# Patient Record
Sex: MALE | Race: WHITE | NOT HISPANIC OR LATINO | Employment: OTHER | URBAN - METROPOLITAN AREA
[De-identification: names, ages, dates, MRNs, and addresses within clinical notes are randomized per-mention and may not be internally consistent; named-entity substitution may affect disease eponyms.]

---

## 2017-01-03 ENCOUNTER — OFFICE VISIT (OUTPATIENT)
Dept: NEUROLOGY | Facility: CLINIC | Age: 72
End: 2017-01-03
Payer: COMMERCIAL

## 2017-01-03 VITALS
HEART RATE: 74 BPM | SYSTOLIC BLOOD PRESSURE: 145 MMHG | HEIGHT: 70 IN | WEIGHT: 258.4 LBS | DIASTOLIC BLOOD PRESSURE: 75 MMHG | BODY MASS INDEX: 36.99 KG/M2

## 2017-01-03 DIAGNOSIS — R89.9 ABNORMAL LABORATORY TEST: ICD-10-CM

## 2017-01-03 DIAGNOSIS — E53.8 LOW SERUM VITAMIN B12: ICD-10-CM

## 2017-01-03 DIAGNOSIS — R79.0 LOW SERUM FERRITIN LEVEL: ICD-10-CM

## 2017-01-03 DIAGNOSIS — G60.9 IDIOPATHIC PERIPHERAL NEUROPATHY: Primary | ICD-10-CM

## 2017-01-03 DIAGNOSIS — G25.81 RESTLESS LEG SYNDROME: ICD-10-CM

## 2017-01-03 PROCEDURE — 99215 OFFICE O/P EST HI 40 MIN: CPT | Performed by: PSYCHIATRY & NEUROLOGY

## 2017-01-03 RX ORDER — GABAPENTIN 300 MG/1
300 CAPSULE ORAL 3 TIMES DAILY
Qty: 270 CAPSULE | Refills: 3 | Status: SHIPPED | OUTPATIENT
Start: 2017-01-10 | End: 2017-01-23 | Stop reason: DRUGHIGH

## 2017-01-03 NOTE — NURSING NOTE
IMAGING: None  BLOOD TEST RESULTS: Not recommended on previous visit  Referral: NA  Old records: NA  EEG: NA

## 2017-01-03 NOTE — PROGRESS NOTES
ESTABLISHED PATIENT NEUROLOGY NOTE    LOCATION: [facility] [place]    DATE OF VISIT: January 3, 2017   PRIMARY CARE PROVIDER: Jesse Edgar PA-C    REASON FOR VISIT: Test Result and plan of management    HISTORY OF PRESENT ILLNESS ([place]): Mr. Sylvie Harvey previously referred by Jesse Edgar PA-C.     71 year old man with clinical features of peripheral neuropathy. He was started on small dose of GABAPENTIN and currently he is taking 100mg three times/day. He thinks it has helped him minimally, only with numbness symptoms. He continues having increasing pain and tingling symptoms of the lower limbs.     His blood tests were reviewed and unfortunately he did not check them in [facility].     His serum ferritin is significantly low at 9. He was advised to start taking iron and vitamin C supplements. No history of blood loss either rectally, or per urethra or upper GI. Last colonoscopy was 5 years ago.     He also has low Vitamin B 12 level, and therefore was advised to start taking Vitamin B12 2500 microgram daily.     His positive DEBBIE is of nonspecific cause. It does not need any further evaluation.     He had minimally elevated IGA with decreased IGM, absent monoclonal protein.     He did not have any Electromyography test of the lower limbs.     He does have some symptoms which raise the possibility of restless leg syndrome. His symptoms of feelings of insects crawling in his lower limbs have subsided. He denies urge to keep pacing around the house. He denies any undue discomfort with prolonged sitting.     Reviewed and updated in [facility]:    Current Outpatient Prescriptions on File Prior to Visit:  gabapentin (NEURONTIN) 100 MG capsule Week 1: 100 mg in the evening. Week 2: 100 mg times a day. Week 3 and afterwards : 100 mg three times a day   atorvastatin (LIPITOR) 40 MG tablet Take 1 tablet (40 mg) by mouth daily   prasugrel (EFFIENT) 10 MG TABS Take 1 tablet (10 mg) by mouth daily    hydrochlorothiazide (HYDRODIURIL) 25 MG tablet Take 1 tablet (25 mg) by mouth daily   losartan (COZAAR) 50 MG tablet Take 1 tablet (50 mg) by mouth daily   metoprolol (LOPRESSOR) 25 MG tablet Take 1 tablet (25 mg) by mouth 2 times daily   buPROPion (WELLBUTRIN XL) 300 MG 24 hr tablet Take 1 tablet (300 mg) by mouth every morning   nitroglycerin (NITROSTAT) 0.4 MG SL tablet Place 1 tablet (0.4 mg) under the tongue every 5 minutes as needed for chest pain (may repeat x2)   aspirin 81 MG tablet Take 1 tablet by mouth daily.     No current facility-administered medications on file prior to visit.  Past Medical History   Diagnosis Date     Arthritis      Hypertension      Headaches      Drug abuse      Quit over 25 yrs ago     Alcohol abuse      Quit over 25 yrs ago     Past Surgical History   Procedure Laterality Date     Appendectomy       Orthopedic surgery Right      Scar over dorsum aspect of right wrist.      Orthopedic surgery Right      Knee     Orthopedic surgery Bilateral      CTS release     Surgical history of -        Umbilical Hernia     Surgical history of -   2013     Coronary Stent     Social History     Social History     Marital Status:      Spouse Name: Jenna     Number of Children: 4     Years of Education: 12     Occupational History     [employer]     Part-time [employer]     Social History Main Topics     Smoking status: Former Smoker     Quit date: 04/17/1983     Smokeless tobacco: Never Used     Alcohol Use: No      Comment: Quit 25 years ago (2013)     Drug Use: No     Sexual Activity:     Partners: Female     Other Topics Concern     None     Social History Narrative   This document serves as a record of the services and decisions personally performed and made by Lenard Valerio MD. It was created on his behalf by Delores Antonio, a trained medical scribe. The creation of this document is based the provider's statements to the medical scribe.    Vanibyousuf Antonio  "1/3/2017    GENERAL EXAMINATION:   General appearance: Pleasant male sitting comfortably in a chair  /75 mmHg  Pulse 74  Ht 1.765 m (5' 9.5\")  Wt 117.209 kg (258 lb 6.4 oz)  BMI 37.62 kg/m2    IMPRESSION:  Encounter Diagnoses   Name Primary?     Idiopathic peripheral neuropathy Yes     Low serum vitamin B12      Low serum ferritin level      Abnormal laboratory test - elevated IGA, absent monoclonal protein      Restless leg syndrome      COMMENTS: Increasing symptoms of peripheral neuropathy. Arranged for him to have Electromyography of the lower limbs to find out the type and severity of neuropathy. He does have some component of restless leg syndrome with it also, with significant low ferritin level, requiring treatment. Jesse Edgar PA-C may consider evaluating him for GI blood loss, especially in view of very low ferritin although it can be seen in individuals with restless leg syndrome. I may add that restless leg syndrome symptoms are minimal compared to peripheral neuropathy symptoms.     PLAN/RECOMMENDATIONS:   Patient Instructions     Lenard Valerio MD at 10/30/2016  3:45 PM      Status: Signed         Expand All Collapse All    Quick Note:      Dear Mr. Harvey,    Your recent blood tests are normal except for:  -Low normal ferritin (total body iron store) with recommended target of >50. Therefore I am suggesting you to start taking the following tablets/capsules for next 4 months:  Over-the-counter ferrous gluconate 324 mg (38 Fe) 1 tablet three times a day after food.  Over-the-counter vitamin C 500 mg with each iron tablet to increase iron absorption  Please note that stool can be black colored during iron therapy.  --Low vitamin B12. Advised to take over-the-counter vitamin B12 one tablet 2500 microgram  Daily.  -Positive antinuclear antibody (DEBBIE), non specific antibody  -Elevated IgA and low IgM of non-specific significance    * Please call Jesse Edgar PA-C's " office to look for detailed evaluation of low ferritin level, as it may involve GI consultation for loss of blood leading to it.     Results for orders placed or performed in visit on 10/25/16  -Ferritin      Result                                            Value                         Ref Range                      Ferritin                                          9 (L)                         26 - 388 ng/mL             -Vitamin B12      Result                                            Value                         Ref Range                      Vitamin B12                                       254                           193 - 986 pg/mL            -Protein Immunofixation Serum      Result                                            Value                         Ref Range                      Immunofixation ELP                                                                                         No monoclonal protein seen on immunofixation.  Pathological significance requires clinical correlation. Aidan Stacy M.D., Ph.D.       IGG                                               1530                          695 - 1620 mg/dL               IGA                                               387 (H)                       70 - 380 mg/dL                 IGM                                               38 (L)                        60 - 265 mg/dL             -Antinuclear antibody screen by EIA      Result                                            Value                         Ref Range                      DEBBIE Screen by EIA                                 1.0 (H)                       <1.0                       -Methylmalonic acid      Result                                            Value                         Ref Range                      Methylmalonic Acid                                0.17                                                         Best wishes.    Thanks.    Sincerely,    Lenard Valerio,  MD, [facility]  Neurologist               Electromyography (EMG) test (Lower limbs))   DATE:  Thursday, January 12, 2016  TIME: Reporting by 12:40 PM for registration at  and test preparation.   LOCATION: [facility] (2nd floor)    You may bring pair of warm gloves and or socks to keep your hands and feet warm during the test.     No restrictions for diet. Please feel free to have breakfast/lunch before arrival.     Please do not apply lotion or cream to the skin one day before the test; as it could hinder the electrode connection to the skin.    EMG procedure and reasons for it explained.  -------------------------------------------------------------------------------------------------------------------  Feet Care.     Orders Placed This Encounter   Medications     gabapentin (NEURONTIN) 300 MG capsule     Sig: Take 1 capsule (300 mg) by mouth 3 times daily     Dispense:  270 capsule     Refill:  3     Advised him to start taking GABAPENTIN 100 mg strength, two tablets three times a day for one week, and after that GABAPENTIN strength 300 mg, one tablet three times day. He has enough GABAPENTIN 100 mg strength from the previous prescription.     He is advised to see his Jesse Edgar PA-C regarding his low ferritin level, but in the meantime he will start taking iron with Vitamin C supplements as indicated above.      Diagnostic possibilities reviewed    Preventive Neurology: Encouraged to keep physically and mentally active with particular emphasis on daily stretching exercises, walking, and healthy eating.    Thanks to Ifeanyi Xiaoor allowing me to take part in Sylvie TERESA Wes's care.  Please call me if you have any questions or concerns.    Time with patient  25 minutes, greater than 50% of which was counseling and coordination of care.    Lenard Valerio MD, [facility]  Neurologist    Cc: Jesse Edgar PA-C

## 2017-01-03 NOTE — MR AVS SNAPSHOT
After Visit Summary   1/3/2017    Sylvie Harvey    MRN: [id number]           Patient Information     Date Of Birth          1945        Visit Information        Provider Department      1/3/2017 4:00 PM Lenard Valerio MD [facility]        Today's Diagnoses     Idiopathic peripheral neuropathy    -  1     Low serum vitamin B12         Low serum ferritin level         Abnormal laboratory test - elevated IGA, absent monoclonal protein           Care Instructions    Lenard Valerio MD at 10/30/2016  3:45 PM      Status: Signed         Expand All Collapse All    Quick Note:      Dear Mr. Harvey,    Your recent blood tests are normal except for:  -Low normal ferritin (total body iron store) with recommended target of >50. Therefore I am suggesting you to start taking the following tablets/capsules for next 4 months:  Over-the-counter ferrous gluconate 324 mg (38 Fe) 1 tablet three times a day after food.  Over-the-counter vitamin C 500 mg with each iron tablet to increase iron absorption  Please note that stool can be black colored during iron therapy.  --Low vitamin B12. Advised to take over-the-counter vitamin B12 one tablet 2500 microgram  Daily.  -Positive antinuclear antibody (DEBBIE), non specific antibody  -Elevated IgA and low IgM of non-specific significance    * Please call Jesse Edgar PA-C's office to look for detailed evaluation of low ferritin level, as it may involve GI consultation for loss of blood leading to it.     Results for orders placed or performed in visit on 10/25/16  -Ferritin      Result                                            Value                         Ref Range                      Ferritin                                          9 (L)                         26 - 388 ng/mL             -Vitamin B12      Result                                            Value                         Ref Range                      Vitamin B12                                        254                           193 - 986 pg/mL            -Protein Immunofixation Serum      Result                                            Value                         Ref Range                      Immunofixation ELP                                                                                         No monoclonal protein seen on immunofixation.  Pathological significance requires clinical correlation. Aidan Stacy M.D., Ph.D.       IGG                                               1530                          695 - 1620 mg/dL               IGA                                               387 (H)                       70 - 380 mg/dL                 IGM                                               38 (L)                        60 - 265 mg/dL             -Antinuclear antibody screen by EIA      Result                                            Value                         Ref Range                      DEBBIE Screen by EIA                                 1.0 (H)                       <1.0                       -Methylmalonic acid      Result                                            Value                         Ref Range                      Methylmalonic Acid                                0.17                                                         Best wishes.    Thanks.    Sincerely,    Lenard Valerio MD, [facility]  Neurologist               Electromyography (EMG) test (Lower limbs))   DATE:  Thursday, January 12, 2016  TIME: Reporting by 12:40 PM for registration at  and test preparation.   LOCATION: [facility] (2nd floor)    You may bring pair of warm gloves and or socks to keep your hands and feet warm during the test.     No restrictions for diet. Please feel free to have breakfast/lunch before arrival.     Please do not apply lotion or cream to the skin one day before the test; as it could hinder the electrode connection to the  skin.    EMG procedure and reasons for it explained.  -------------------------------------------------------------------------------------------------------------------  Feet Care.     Orders Placed This Encounter   Medications     gabapentin (NEURONTIN) 300 MG capsule     Sig: Take 1 capsule (300 mg) by mouth 3 times daily     Dispense:  270 capsule     Refill:  3     Advised him to start taking GABAPENTIN 100 mg strength, two tablets three times a day for one week, and after that GABAPENTIN strength 300 mg, one tablet three times day. He has enough GABAPENTIN 100 mg strength from the previous prescription.     He is advised to see his Jesse Edgar PA-C regarding his low ferritin level, but in the meantime he will start taking iron with Vitamin C supplements as indicated above.      Diagnostic possibilities reviewed    Preventive Neurology: Encouraged to keep physically and mentally active with particular emphasis on daily stretching exercises, walking, and healthy eating.    Thanks           Follow-ups after your visit        Follow-up notes from your care team     Return in about 9 days (around 1/12/2017) for EMG LL: reporting by 12:40pm. .      Your next 10 appointments already scheduled     Jan 12, 2017  1:00 PM   PROCEDURE with Lenard Valerio MD   [facility] ([facility])    [address] [place] [id number] [phone]              Who to contact     If you have questions or need follow up information about today's clinic visit or your schedule please contact [facility] directly at [phone].  Normal or non-critical lab and imaging results will be communicated to you by MyChart, letter or phone within 4 business days after the clinic has received the results. If you do not hear from us within 7 days, please contact the clinic through MyChart or phone. If you have a critical or abnormal lab result, we will notify you by  "phone as soon as possible.  Submit refill requests through [employer] or call your pharmacy and they will forward the refill request to us. Please allow 3 business days for your refill to be completed.          Additional Information About Your Visit        [employer] Information     [employer] gives you secure access to your electronic health record. If you see a primary care provider, you can also send messages to your care team and make appointments. If you have questions, please call your primary care clinic.  If you do not have a primary care provider, please call [phone] and they will assist you.        Care EveryWhere ID     This is your Care EveryWhere ID. This could be used by other organizations to access your [facility] medical records  [id number]        Your Vitals Were     Pulse Height BMI (Body Mass Index)             74 1.765 m (5' 9.5\") 37.62 kg/m2          Blood Pressure from Last 3 Encounters:   01/03/17 145/75   10/25/16 156/74   10/18/16 138/80    Weight from Last 3 Encounters:   01/03/17 117.209 kg (258 lb 6.4 oz)   10/25/16 115.214 kg (254 lb)   10/18/16 113.853 kg (251 lb)              Today, you had the following     No orders found for display         Today's Medication Changes          These changes are accurate as of: 1/3/17  5:46 PM.  If you have any questions, ask your nurse or doctor.               These medicines have changed or have updated prescriptions.        Dose/Directions    * gabapentin 100 MG capsule   Commonly known as:  NEURONTIN   This may have changed:  Another medication with the same name was added. Make sure you understand how and when to take each.   Used for:  Idiopathic peripheral neuropathy   Changed by:  Lenard Valerio MD        Week 1: 100 mg in the evening. Week 2: 100 mg times a day. Week 3 and afterwards : 100 mg three times a day   Quantity:  270 capsule   Refills:  3       * gabapentin 300 MG capsule   Commonly known as:  NEURONTIN   This may have changed:  " You were already taking a medication with the same name, and this prescription was added. Make sure you understand how and when to take each.   Used for:  Idiopathic peripheral neuropathy   Changed by:  Lenard Valerio MD        Dose:  300 mg   Start taking on:  1/10/2017   Take 1 capsule (300 mg) by mouth 3 times daily   Quantity:  270 capsule   Refills:  3       * Notice:  This list has 2 medication(s) that are the same as other medications prescribed for you. Read the directions carefully, and ask your doctor or other care provider to review them with you.         Where to get your medicines      These medications were sent to [facility] [id number] [place] [address], [facility]  [address] [address], [facility] [address]     Phone:  [phone]    - gabapentin 300 MG capsule             Primary Care Provider Office Phone # Fax #    Jesse Edgar PA-C [phone]       [facility] [address] [facility] [address]        Thank you!     Thank you for choosing [facility]  for your care. Our goal is always to provide you with excellent care. Hearing back from our patients is one way we can continue to improve our services. Please take a few minutes to complete the written survey that you may receive in the mail after your visit with us. Thank you!             Your Updated Medication List - Protect others around you: Learn how to safely use, store and throw away your medicines at www.disposemymeds.org.          This list is accurate as of: 1/3/17  5:46 PM.  Always use your most recent med list.                   Brand Name Dispense Instructions for use    aspirin 81 MG tablet      Take 1 tablet by mouth daily.       atorvastatin 40 MG tablet    LIPITOR    90 tablet    Take 1 tablet (40 mg) by mouth daily       buPROPion 300 MG 24 hr tablet    WELLBUTRIN XL    90 tablet    Take 1 tablet (300 mg) by mouth every morning       * gabapentin 100 MG  capsule    NEURONTIN    270 capsule    Week 1: 100 mg in the evening. Week 2: 100 mg times a day. Week 3 and afterwards : 100 mg three times a day       * gabapentin 300 MG capsule   Start taking on:  1/10/2017    NEURONTIN    270 capsule    Take 1 capsule (300 mg) by mouth 3 times daily       hydrochlorothiazide 25 MG tablet    HYDRODIURIL    90 tablet    Take 1 tablet (25 mg) by mouth daily       losartan 50 MG tablet    COZAAR    90 tablet    Take 1 tablet (50 mg) by mouth daily       metoprolol 25 MG tablet    LOPRESSOR    180 tablet    Take 1 tablet (25 mg) by mouth 2 times daily       nitroglycerin 0.4 MG sublingual tablet    NITROSTAT    25 tablet    Place 1 tablet (0.4 mg) under the tongue every 5 minutes as needed for chest pain (may repeat x2)       prasugrel 10 MG Tabs tablet    EFFIENT    30 tablet    Take 1 tablet (10 mg) by mouth daily       * Notice:  This list has 2 medication(s) that are the same as other medications prescribed for you. Read the directions carefully, and ask your doctor or other care provider to review them with you.

## 2017-01-03 NOTE — NURSING NOTE
"Chief Complaint   Patient presents with     RECHECK     Lower Limb numbness       Initial /75 mmHg  Pulse 74  Ht 1.765 m (5' 9.5\")  Wt 117.209 kg (258 lb 6.4 oz)  BMI 37.62 kg/m2 Estimated body mass index is 37.62 kg/(m^2) as calculated from the following:    Height as of this encounter: 1.765 m (5' 9.5\").    Weight as of this encounter: 117.209 kg (258 lb 6.4 oz).  BP completed using cuff size: kobe Oakes MA      "

## 2017-01-04 ENCOUNTER — TELEPHONE (OUTPATIENT)
Dept: PHARMACY | Facility: OTHER | Age: 72
End: 2017-01-04

## 2017-01-04 NOTE — TELEPHONE ENCOUNTER
MTM referral from: [facility]     MTM referral outreach attempt #1 on January 4, 2017 at 3:14 PM      Outcome: Patient is not interested at this time because he said he did not know about this referral and his insurance does not cover MTM, will route to MTM Pharmacist/Provider as an FYI. Thank you for the referral.     Anay Doll, MTM Coordinator

## 2017-01-09 ENCOUNTER — TELEPHONE (OUTPATIENT)
Dept: FAMILY MEDICINE | Facility: CLINIC | Age: 72
End: 2017-01-09

## 2017-01-09 DIAGNOSIS — I10 HYPERTENSION GOAL BP (BLOOD PRESSURE) < 140/90: ICD-10-CM

## 2017-01-09 DIAGNOSIS — I25.10 CAD (CORONARY ARTERY DISEASE): Primary | ICD-10-CM

## 2017-01-09 RX ORDER — HYDROCHLOROTHIAZIDE 25 MG/1
25 TABLET ORAL DAILY
Qty: 90 TABLET | Refills: 0 | Status: SHIPPED | OUTPATIENT
Start: 2017-01-09 | End: 2017-03-28

## 2017-01-09 RX ORDER — METOPROLOL TARTRATE 25 MG/1
25 TABLET, FILM COATED ORAL 2 TIMES DAILY
Qty: 180 TABLET | Refills: 0 | Status: SHIPPED | OUTPATIENT
Start: 2017-01-09 | End: 2017-03-28

## 2017-01-09 RX ORDER — NITROGLYCERIN 0.4 MG/1
0.4 TABLET SUBLINGUAL EVERY 5 MIN PRN
Qty: 10 TABLET | Refills: 3 | Status: SHIPPED | OUTPATIENT
Start: 2017-01-09

## 2017-01-09 RX ORDER — LOSARTAN POTASSIUM 50 MG/1
50 TABLET ORAL DAILY
Qty: 90 TABLET | Refills: 0 | Status: SHIPPED | OUTPATIENT
Start: 2017-01-09 | End: 2017-03-28

## 2017-01-09 NOTE — TELEPHONE ENCOUNTER
Patient aware of which medications were sent to [facility] for refill.  He states understanding that per our records this is what he will need refilled.  States is ok with these refills; did review each med with him.  Will be seen again n April.  Akua Pino RN

## 2017-01-09 NOTE — TELEPHONE ENCOUNTER
Reason for Call:  Medication refill:    Do you use a [facility]?  Name of the pharmacy and phone number for the current request:  [facility] [phone]    Name of the medication requested: nitroglycerin (NITROSTAT) 0.4 MG SL tablet & hydrochlorothiazide (HYDRODIURIL) 25 MG tablet     Other request: Patient needs meds refilled.  These 2 meds plus he will check at home if there are any other meds to refill.  He only wants quantity 10 for his Nitrolycerin, and not 25.  Please call and advise.  Thank you.    Can we leave a detailed message on this number? YES    Phone number patient can be reached at: Cell number on file [phone] (home)    Best Time: anytime    Call taken on 1/9/2017 at 2:29 PM by Maryellen Ramos

## 2017-01-11 DIAGNOSIS — E78.5 HYPERLIPIDEMIA LDL GOAL <130: ICD-10-CM

## 2017-01-11 DIAGNOSIS — I25.119 CORONARY ARTERY DISEASE INVOLVING NATIVE HEART WITH ANGINA PECTORIS, UNSPECIFIED VESSEL OR LESION TYPE (H): Primary | ICD-10-CM

## 2017-01-12 ENCOUNTER — TELEPHONE (OUTPATIENT)
Dept: NEUROLOGY | Facility: CLINIC | Age: 72
End: 2017-01-12

## 2017-01-12 RX ORDER — ATORVASTATIN CALCIUM 40 MG/1
40 TABLET, FILM COATED ORAL DAILY
Qty: 90 TABLET | Refills: 0 | Status: SHIPPED | OUTPATIENT
Start: 2017-01-12 | End: 2017-03-28

## 2017-01-13 NOTE — TELEPHONE ENCOUNTER
Reason for Call:  Other prescription    Detailed comments: pt calling back states needs to discuss with provider about refills was told to get in contact with provider regarding medications please advise and contact    Phone Number Patient can be reached at: Home number on file [phone] (home)    Best Time: ANY    Can we leave a detailed message on this number? YES    Call taken on 1/13/2017 at 2:28 PM by Ava Calvert

## 2017-01-13 NOTE — TELEPHONE ENCOUNTER
Pt states he got letter from pharmacy to contact provider.  Advised we have done multiple refills recently and I do not know which med they are referring to.  He will call them.  Sandra Fernandez RN

## 2017-01-23 ENCOUNTER — OFFICE VISIT (OUTPATIENT)
Dept: NEUROLOGY | Facility: CLINIC | Age: 72
End: 2017-01-23
Payer: COMMERCIAL

## 2017-01-23 DIAGNOSIS — R79.0 LOW FERRITIN LEVEL: ICD-10-CM

## 2017-01-23 DIAGNOSIS — G60.9 IDIOPATHIC PERIPHERAL NEUROPATHY: Primary | ICD-10-CM

## 2017-01-23 DIAGNOSIS — R79.89 LOW VITAMIN B12 LEVEL: ICD-10-CM

## 2017-01-23 DIAGNOSIS — G25.81 RESTLESS LEGS SYNDROME: ICD-10-CM

## 2017-01-23 PROCEDURE — 95909 NRV CNDJ TST 5-6 STUDIES: CPT | Performed by: PSYCHIATRY & NEUROLOGY

## 2017-01-23 RX ORDER — GABAPENTIN 400 MG/1
400 CAPSULE ORAL 3 TIMES DAILY
Qty: 270 CAPSULE | Refills: 3 | Status: SHIPPED | OUTPATIENT
Start: 2017-02-23 | End: 2017-10-10

## 2017-01-23 RX ORDER — GABAPENTIN 400 MG/1
400 CAPSULE ORAL 3 TIMES DAILY
Qty: 90 CAPSULE | Refills: 0 | Status: SHIPPED | OUTPATIENT
Start: 2017-01-23 | End: 2017-10-10

## 2017-01-23 NOTE — PATIENT INSTRUCTIONS
AFTER VISIT SUMMARY (AVS)  Signed Prescriptions:                        Disp   Refills    gabapentin (NEURONTIN) 400 MG capsule      90 cap*0        Sig: Take 1 capsule (400 mg) by mouth 3 times daily  Authorizing Provider: EDUARDO TIM    gabapentin (NEURONTIN) 400 MG capsule      270 ca*3        Sig: Take 1 capsule (400 mg) by mouth 3 times daily  Authorizing Provider: EDUARDO TIM    Electromyography test results reviewed with him    Feet care    Preventive Neurology: Encouraged to keep physically and mentally active with particular emphasis on daily stretching exercises, walking, and healthy eating.        To call us for follow-up appointment in next 4 month(s) or earlier if needed.    Thanks

## 2017-01-23 NOTE — NURSING NOTE
Cardiac Pacemaker: None  Corticosteroids  (Prednisone):  None  Coumadin:  None  Defibrillator: None  Previous EMG study: None  Surgeries: Cervical spine: None Lumbar spine: None Hip surgery: No  Scars other than from surgeries indicated above:

## 2017-01-23 NOTE — MR AVS SNAPSHOT
After Visit Summary   1/23/2017    Sylvie Harvey    MRN: [id number]           Patient Information     Date Of Birth          1945        Visit Information        Provider Department      1/23/2017 2:00 PM Lenard Valerio MD [facility]        Today's Diagnoses     Idiopathic peripheral neuropathy    -  1     Low ferritin level         Low vitamin B12 level         Restless legs syndrome           Care Instructions    AFTER VISIT SUMMARY (AVS)  Signed Prescriptions:                        Disp   Refills    gabapentin (NEURONTIN) 400 MG capsule      90 cap*0        Sig: Take 1 capsule (400 mg) by mouth 3 times daily  Authorizing Provider: LENARD VALERIO    gabapentin (NEURONTIN) 400 MG capsule      270 ca*3        Sig: Take 1 capsule (400 mg) by mouth 3 times daily  Authorizing Provider: LENARD VALERIO    Electromyography test results reviewed with him    Feet care    Preventive Neurology: Encouraged to keep physically and mentally active with particular emphasis on daily stretching exercises, walking, and healthy eating.        To call us for follow-up appointment in next 4 month(s) or earlier if needed.    Thanks             Follow-ups after your visit        Follow-up notes from your care team     Return in about 4 months (around 5/23/2017).      Who to contact     If you have questions or need follow up information about today's clinic visit or your schedule please contact [facility] directly at [phone].  Normal or non-critical lab and imaging results will be communicated to you by MyChart, letter or phone within 4 business days after the clinic has received the results. If you do not hear from us within 7 days, please contact the clinic through MyChart or phone. If you have a critical or abnormal lab result, we will notify you by phone as soon as possible.  Submit refill requests through [email or web address] or call your pharmacy and they will  forward the refill request to us. Please allow 3 business days for your refill to be completed.          Additional Information About Your Visit        [employer] Information     [employer] gives you secure access to your electronic health record. If you see a primary care provider, you can also send messages to your care team and make appointments. If you have questions, please call your primary care clinic.  If you do not have a primary care provider, please call [phone] and they will assist you.        Care EveryWhere ID     This is your Care EveryWhere ID. This could be used by other organizations to access your [place] medical records  [id number]         Blood Pressure from Last 3 Encounters:   01/03/17 145/75   10/25/16 156/74   10/18/16 138/80    Weight from Last 3 Encounters:   01/03/17 117.209 kg (258 lb 6.4 oz)   10/25/16 115.214 kg (254 lb)   10/18/16 113.853 kg (251 lb)              Today, you had the following     No orders found for display         Today's Medication Changes          These changes are accurate as of: 1/23/17  3:18 PM.  If you have any questions, ask your nurse or doctor.               These medicines have changed or have updated prescriptions.        Dose/Directions    * gabapentin 400 MG capsule   Commonly known as:  NEURONTIN   This may have changed:    - medication strength  - how much to take  - how to take this  - when to take this  - additional instructions   Used for:  Idiopathic peripheral neuropathy, Low ferritin level, Low vitamin B12 level        Dose:  400 mg   Take 1 capsule (400 mg) by mouth 3 times daily   Quantity:  90 capsule   Refills:  0       * gabapentin 400 MG capsule   Commonly known as:  NEURONTIN   This may have changed:    - medication strength  - how much to take   Used for:  Idiopathic peripheral neuropathy, Restless legs syndrome        Dose:  400 mg   Start taking on:  2/23/2017   Take 1 capsule (400 mg) by mouth 3 times daily   Quantity:  270 capsule    Refills:  3       * Notice:  This list has 2 medication(s) that are the same as other medications prescribed for you. Read the directions carefully, and ask your doctor or other care provider to review them with you.         Where to get your medicines      These medications were sent to [facility] - [place], [place] - [address] [address], [address]  [address] [address], [facility] [address]     Phone:  [phone]    - gabapentin 400 MG capsule      These medications were sent to [facility] - [place] [address] [facility]  [address], [facility] [address]     Phone:  [phone]    - gabapentin 400 MG capsule             Primary Care Provider Office Phone # Fax #    Jesse Edgar PA-C [phone]       [facility] [address] [facility] [address]        Thank you!     Thank you for choosing [facility]  for your care. Our goal is always to provide you with excellent care. Hearing back from our patients is one way we can continue to improve our services. Please take a few minutes to complete the written survey that you may receive in the mail after your visit with us. Thank you!             Your Updated Medication List - Protect others around you: Learn how to safely use, store and throw away your medicines at www.disposemymeds.org.          This list is accurate as of: 1/23/17  3:18 PM.  Always use your most recent med list.                   Brand Name Dispense Instructions for use    aspirin 81 MG tablet      Take 1 tablet by mouth daily.       atorvastatin 40 MG tablet    LIPITOR    90 tablet    Take 1 tablet (40 mg) by mouth daily       buPROPion 300 MG 24 hr tablet    WELLBUTRIN XL    90 tablet    Take 1 tablet (300 mg) by mouth every morning       * gabapentin 400 MG capsule    NEURONTIN    90 capsule    Take 1 capsule (400 mg) by mouth 3 times daily       * gabapentin 400 MG capsule   Start taking  on:  2/23/2017    NEURONTIN    270 capsule    Take 1 capsule (400 mg) by mouth 3 times daily       hydrochlorothiazide 25 MG tablet    HYDRODIURIL    90 tablet    Take 1 tablet (25 mg) by mouth daily       losartan 50 MG tablet    COZAAR    90 tablet    Take 1 tablet (50 mg) by mouth daily       metoprolol 25 MG tablet    LOPRESSOR    180 tablet    Take 1 tablet (25 mg) by mouth 2 times daily       nitroglycerin 0.4 MG sublingual tablet    NITROSTAT    10 tablet    Place 1 tablet (0.4 mg) under the tongue every 5 minutes as needed for chest pain (may repeat x2)       prasugrel 10 MG Tabs tablet    EFFIENT    30 tablet    Take 1 tablet (10 mg) by mouth daily       * Notice:  This list has 2 medication(s) that are the same as other medications prescribed for you. Read the directions carefully, and ask your doctor or other care provider to review them with you.

## 2017-01-23 NOTE — PROGRESS NOTES
ELECTRODIAGNOSTIC LABORATORY REPORT    LOCATION: [facility]  MRN: [id number]  NAME: Mr. Sylvie Harvey  :  1945 (71 year old)  DATE OF VISIT:  2017  PRIMARY PROVIDER: Jesse Edgar PA-C    REASON FOR THE TEST: Feet paresthesias    CLINICAL DATA:  71 year old man with increasing symptoms of feet paresthesias for more than 2 years duration. Additional issues have been restless leg syndrome along with low serum ferritin. He continues taking iron supplement, Vitamin C, Vitamin B12 on a daily basis. He feels that GABAPENTIN 300MG TID has not been helping him enough. H/o low ferritin, vitamin B12 and concern for possibility of Restless legs syndrome.  Local examination shows that he has pitting edema of the legs extending up to knees.  For additional details, please see my recent office consultation note.     This document serves as a record of the services and decisions personally performed and made by Lenard Valerio MD. It was created on his behalf by Penny Perez, a trained medical scribe. The creation of this document is based the provider's statements to the medical scribe.    Scribyousuf Perez 2017     PERTINENT FINDINGS:   Sensory studies:  1. Right & Left Sural sensory responses normal    Motor studies:  1. Right Peroneal (EDB muscle) motor response normal   2. Right & Left Tibial (AH muscle) motor responses normal     Needle EMG examination of Right & left lower limb muscles not done because of the pitting edema up to the knees.     IMPRESSION: Normal nerve conduction study. There is no definite electrophysiological evidence of acquired distal generalized polyneuropathy.  The possibility of small fiber neuropathy cannot be completely excludes as you know that this test evaluates only large nerve fibers.     COMMENTS: The test results reviewed with him. Increased his GABAPENTIN from 300 MG TID to 400 MG TID. Advised to continue taking iron supplements along  with Vitamin C, and Vitamin B12, 2000 MCG daily as discussed during his last visit.      Thanks to Jesse Edgar PA-C for allowing me to participate in 's care. Please call me if you have any questions or concerns.    Cc. Jesse Edgar PA-C

## 2017-03-28 DIAGNOSIS — I10 HYPERTENSION GOAL BP (BLOOD PRESSURE) < 140/90: ICD-10-CM

## 2017-03-28 DIAGNOSIS — E78.5 HYPERLIPIDEMIA LDL GOAL <130: ICD-10-CM

## 2017-03-28 DIAGNOSIS — I25.119 CORONARY ARTERY DISEASE INVOLVING NATIVE HEART WITH ANGINA PECTORIS, UNSPECIFIED VESSEL OR LESION TYPE (H): ICD-10-CM

## 2017-03-28 DIAGNOSIS — F32.0 MILD MAJOR DEPRESSION (H): ICD-10-CM

## 2017-03-28 RX ORDER — BUPROPION HYDROCHLORIDE 300 MG/1
TABLET ORAL
Qty: 90 TABLET | Refills: 1 | Status: SHIPPED | OUTPATIENT
Start: 2017-03-28 | End: 2017-10-10

## 2017-03-28 RX ORDER — METOPROLOL TARTRATE 25 MG/1
TABLET, FILM COATED ORAL
Qty: 180 TABLET | Refills: 1 | Status: SHIPPED | OUTPATIENT
Start: 2017-03-28 | End: 2017-10-10

## 2017-03-28 RX ORDER — LOSARTAN POTASSIUM 50 MG/1
TABLET ORAL
Qty: 90 TABLET | Refills: 1 | Status: SHIPPED | OUTPATIENT
Start: 2017-03-28 | End: 2017-10-10

## 2017-03-28 RX ORDER — ATORVASTATIN CALCIUM 40 MG/1
TABLET, FILM COATED ORAL
Qty: 90 TABLET | Refills: 1 | Status: SHIPPED | OUTPATIENT
Start: 2017-03-28 | End: 2017-10-10

## 2017-03-28 RX ORDER — HYDROCHLOROTHIAZIDE 25 MG/1
TABLET ORAL
Qty: 90 TABLET | Refills: 1 | Status: SHIPPED | OUTPATIENT
Start: 2017-03-28 | End: 2017-10-10

## 2017-03-29 ASSESSMENT — PATIENT HEALTH QUESTIONNAIRE - PHQ9: SUM OF ALL RESPONSES TO PHQ QUESTIONS 1-9: 0

## 2017-04-18 ENCOUNTER — TELEPHONE (OUTPATIENT)
Dept: FAMILY MEDICINE | Facility: CLINIC | Age: 72
End: 2017-04-18

## 2017-04-18 NOTE — TELEPHONE ENCOUNTER
Panel Management Review      Patient has the following on his problem list: None      Composite cancer screening  Chart review shows that this patient is due/due soon for the following Colonoscopy  Summary:    Patient is due/failing the following:   COLONOSCOPY    Action needed:   Patient needs referral/order: colon    Type of outreach:    Sent letter.    Questions for provider review:    None                                                                                                                                    SUSHANT Ling   2:21 PM  4/18/2017       Chart routed to closed .

## 2017-04-18 NOTE — LETTER
[facility]  [address] [address]  Phone: [phone]    04/18/17    Sylvie BRII Wes  [address] [facility] [facility] [address]      To whom it may concern:     Our records indicate that you have not scheduled for a(n)colonoscopy which was recommended by your health care team. Monitoring and managing your preventative and chronic health conditions are very important to us.     If you have received your health care elsewhere, please provide us with that information so it can be documented in your chart.    Please call [phone] or message us through your [employer]aitment account to schedule an appointment or provide information for your chart.     I look forward to seeing you and working with you on your health care needs.       *If you have already scheduled an appointment, please disregard this reminder    Sincerely,      Jesse Edgar PA-C

## 2017-08-14 ENCOUNTER — TELEPHONE (OUTPATIENT)
Dept: FAMILY MEDICINE | Facility: CLINIC | Age: 72
End: 2017-08-14

## 2017-08-14 NOTE — TELEPHONE ENCOUNTER
8/14/2017    Call Regarding Preventive Health Screening Colonoscopy    Attempt 1    Message on voicemail     Comments: manual dial      Outreach   Mickie Quesada

## 2017-08-23 ENCOUNTER — ALLIED HEALTH/NURSE VISIT (OUTPATIENT)
Dept: FAMILY MEDICINE | Facility: CLINIC | Age: 72
End: 2017-08-23
Payer: COMMERCIAL

## 2017-08-23 VITALS — DIASTOLIC BLOOD PRESSURE: 62 MMHG | HEART RATE: 64 BPM | SYSTOLIC BLOOD PRESSURE: 126 MMHG

## 2017-08-23 DIAGNOSIS — Z01.30 BP CHECK: Primary | ICD-10-CM

## 2017-08-23 PROCEDURE — 99207 ZZC NO CHARGE NURSE ONLY: CPT | Performed by: PHYSICIAN ASSISTANT

## 2017-08-23 NOTE — TELEPHONE ENCOUNTER
8/23/2017    Call Regarding Preventive Health Screening Colonoscopy    Attempt 2    Message on voicemail     Comments:           Outreach   AT

## 2017-08-23 NOTE — MR AVS SNAPSHOT
After Visit Summary   8/23/2017    Sylvie Harvey    MRN: [id number]           Patient Information     Date Of Birth          1945        Visit Information        Provider Department      8/23/2017 2:08 PM Jesse Edgar PA-C [facility]        Today's Diagnoses     BP check    -  1       Follow-ups after your visit        Who to contact     If you have questions or need follow up information about today's clinic visit or your schedule please contact [facility] directly at [phone].  Normal or non-critical lab and imaging results will be communicated to you by [employer], letter or phone within 4 business days after the clinic has received the results. If you do not hear from us within 7 days, please contact the clinic through [employer] or phone. If you have a critical or abnormal lab result, we will notify you by phone as soon as possible.  Submit refill requests through [employer] or call your pharmacy and they will forward the refill request to us. Please allow 3 business days for your refill to be completed.          Additional Information About Your Visit        MyChart Information     [employer] gives you secure access to your electronic health record. If you see a primary care provider, you can also send messages to your care team and make appointments. If you have questions, please call your primary care clinic.  If you do not have a primary care provider, please call [phone] and they will assist you.        Care EveryWhere ID     This is your Care EveryWhere ID. This could be used by other organizations to access your [place] medical records  [id number]        Your Vitals Were     Pulse                   64            Blood Pressure from Last 3 Encounters:   08/23/17 126/62   01/03/17 145/75   10/25/16 156/74    Weight from Last 3 Encounters:   01/03/17 258 lb 6.4 oz (117.2 kg)   10/25/16 254 lb (115.2 kg)   10/18/16 251 lb (113.9 kg)               Today, you had the following     No orders found for display       Primary Care Provider Office Phone # Fax #    Jesse Edgar PA-C [phone] [address] [facility] [address]        Equal Access to Services     MICHAEL CARVAJAL : Hadii jason ku hadyennyo Soomaali, waaxda luqadaha, qaybta kaalmada adeegyada, maribel stauffer hayyoselynn jessicayaneth galvan kd . So [facility] [phone].    ATENCIÓN: Si habla español, tiene a walker disposición servicios gratuitos de asistencia lingüística. Llame al [phone].    We comply with applicable federal civil rights laws and [place] laws. We do not discriminate on the basis of race, color, national origin, age, disability sex, sexual orientation or gender identity.            Thank you!     Thank you for choosing [facility]  for your care. Our goal is always to provide you with excellent care. Hearing back from our patients is one way we can continue to improve our services. Please take a few minutes to complete the written survey that you may receive in the mail after your visit with us. Thank you!             Your Updated Medication List - Protect others around you: Learn how to safely use, store and throw away your medicines at www.disposemymeds.org.          This list is accurate as of: 8/23/17  2:10 PM.  Always use your most recent med list.                   Brand Name Dispense Instructions for use Diagnosis    aspirin 81 MG tablet      Take 1 tablet by mouth daily.        atorvastatin 40 MG tablet    LIPITOR    90 tablet    Take 1 tablet by mouth  daily    Coronary artery disease involving native heart with angina pectoris, unspecified vessel or lesion type (H), Hyperlipidemia LDL goal <130       buPROPion 300 MG 24 hr tablet    WELLBUTRIN XL    90 tablet    Take 1 tablet by mouth  every morning    Mild major depression (H)       * gabapentin 400 MG capsule    NEURONTIN    90 capsule    Take 1 capsule (400 mg) by mouth 3 times daily     Idiopathic peripheral neuropathy, Low ferritin level, Low vitamin B12 level       * gabapentin 400 MG capsule    NEURONTIN    270 capsule    Take 1 capsule (400 mg) by mouth 3 times daily    Idiopathic peripheral neuropathy, Restless legs syndrome       hydrochlorothiazide 25 MG tablet    HYDRODIURIL    90 tablet    Take 1 tablet by mouth  daily    Hypertension goal BP (blood pressure) < 140/90       losartan 50 MG tablet    COZAAR    90 tablet    Take 1 tablet by mouth  daily    Hypertension goal BP (blood pressure) < 140/90       metoprolol 25 MG tablet    LOPRESSOR    180 tablet    Take 1 tablet by mouth two  times daily    Hypertension goal BP (blood pressure) < 140/90       nitroGLYcerin 0.4 MG sublingual tablet    NITROSTAT    10 tablet    Place 1 tablet (0.4 mg) under the tongue every 5 minutes as needed for chest pain (may repeat x2)    CAD (coronary artery disease)       prasugrel 10 MG Tabs tablet    EFFIENT    30 tablet    Take 1 tablet (10 mg) by mouth daily    Coronary artery disease involving native heart with angina pectoris, unspecified vessel or lesion type (H)       * Notice:  This list has 2 medication(s) that are the same as other medications prescribed for you. Read the directions carefully, and ask your doctor or other care provider to review them with you.

## 2017-08-23 NOTE — PROGRESS NOTES
Sylvie Harvey is enrolled/participating in the retail pharmacy Blood Pressure Goals Achievement Program (BPGAP).  Sylvie Harvey was evaluated at [facility] on August 23, 2017 at which time his blood pressure was:    BP Readings from Last 3 Encounters:   08/23/17 126/62   01/03/17 145/75   10/25/16 156/74     Reviewed lifestyle modifications for blood pressure control and reduction: including making healthy food choices, managing weight, getting regular exercise, smoking cessation, reducing alcohol consumption, monitoring blood pressure regularly.     Sylvie Harvey is not experiencing symptoms.    Follow-Up: BP is at goal of < 150/90 mmHg (patient 60+ years of age without diabetes).  Recommended follow-up at pharmacy in 6 months.     Recommendation to Provider: Jesse Edgar     Sylvie Harvey was evaluated for enrollment into the PGEN study today.    Patient eligible for enrollment:  No  Patient interested in enrollment:  No    Completed by: Yoni Foreman RPh.  [facility]  [phone]

## 2017-08-28 NOTE — TELEPHONE ENCOUNTER
8/28/2017    Call Regarding Preventive Health Screening Colonoscopy    Attempt 3    Message on voicemail     Comments:       Outreach   Mickie Quesada

## 2017-10-03 ENCOUNTER — DOCUMENTATION ONLY (OUTPATIENT)
Dept: LAB | Facility: CLINIC | Age: 72
End: 2017-10-03

## 2017-10-03 DIAGNOSIS — E78.5 HYPERLIPIDEMIA LDL GOAL <130: Primary | ICD-10-CM

## 2017-10-03 DIAGNOSIS — Z12.9 CANCER SCREENING: ICD-10-CM

## 2017-10-03 NOTE — PROGRESS NOTES
Need previsit labs-see orders and close encounter if nothing else needed./Heike Sotomayor,       BP 10/10/17

## 2017-10-05 ENCOUNTER — DOCUMENTATION ONLY (OUTPATIENT)
Dept: LAB | Facility: CLINIC | Age: 72
End: 2017-10-05

## 2017-10-05 DIAGNOSIS — Z12.9 CANCER SCREENING: ICD-10-CM

## 2017-10-05 DIAGNOSIS — E78.5 HYPERLIPIDEMIA LDL GOAL <130: ICD-10-CM

## 2017-10-05 DIAGNOSIS — R79.0 DECREASED FERRITIN: Primary | ICD-10-CM

## 2017-10-05 DIAGNOSIS — R79.0 LOW FERRITIN: Primary | ICD-10-CM

## 2017-10-05 LAB
ANION GAP SERPL CALCULATED.3IONS-SCNC: 8 MMOL/L (ref 3–14)
BUN SERPL-MCNC: 45 MG/DL (ref 7–30)
CALCIUM SERPL-MCNC: 9.2 MG/DL (ref 8.5–10.1)
CHLORIDE SERPL-SCNC: 102 MMOL/L (ref 94–109)
CHOLEST SERPL-MCNC: 114 MG/DL
CO2 SERPL-SCNC: 27 MMOL/L (ref 20–32)
CREAT SERPL-MCNC: 1.39 MG/DL (ref 0.66–1.25)
GFR SERPL CREATININE-BSD FRML MDRD: 50 ML/MIN/1.7M2
GLUCOSE SERPL-MCNC: 95 MG/DL (ref 70–99)
HDLC SERPL-MCNC: 51 MG/DL
LDLC SERPL CALC-MCNC: 47 MG/DL
NONHDLC SERPL-MCNC: 63 MG/DL
POTASSIUM SERPL-SCNC: 4.4 MMOL/L (ref 3.4–5.3)
SODIUM SERPL-SCNC: 137 MMOL/L (ref 133–144)
TRIGL SERPL-MCNC: 78 MG/DL

## 2017-10-05 PROCEDURE — 36415 COLL VENOUS BLD VENIPUNCTURE: CPT | Performed by: PHYSICIAN ASSISTANT

## 2017-10-05 PROCEDURE — 80061 LIPID PANEL: CPT | Performed by: PHYSICIAN ASSISTANT

## 2017-10-05 PROCEDURE — 80048 BASIC METABOLIC PNL TOTAL CA: CPT | Performed by: PHYSICIAN ASSISTANT

## 2017-10-05 PROCEDURE — 82728 ASSAY OF FERRITIN: CPT | Performed by: PSYCHIATRY & NEUROLOGY

## 2017-10-05 NOTE — PROGRESS NOTES
.Your patient was in for lab test today for Oppel. He said he also wanted labs drawn for you for his upcoming appointment. There are no orders in Epic placed by you. I drew JIC tubes.  Please review, tag any orders to the lab appointment or enter orders as a future and I will watch for them.  Thank you   Nery   @ [facility]

## 2017-10-06 LAB — FERRITIN SERPL-MCNC: 161 NG/ML (ref 26–388)

## 2017-10-10 ENCOUNTER — OFFICE VISIT (OUTPATIENT)
Dept: FAMILY MEDICINE | Facility: CLINIC | Age: 72
End: 2017-10-10
Payer: COMMERCIAL

## 2017-10-10 VITALS
HEIGHT: 70 IN | HEART RATE: 66 BPM | DIASTOLIC BLOOD PRESSURE: 79 MMHG | SYSTOLIC BLOOD PRESSURE: 120 MMHG | BODY MASS INDEX: 30.06 KG/M2 | OXYGEN SATURATION: 96 % | WEIGHT: 210 LBS

## 2017-10-10 DIAGNOSIS — E78.5 HYPERLIPIDEMIA LDL GOAL <130: ICD-10-CM

## 2017-10-10 DIAGNOSIS — I25.119 CORONARY ARTERY DISEASE INVOLVING NATIVE HEART WITH ANGINA PECTORIS, UNSPECIFIED VESSEL OR LESION TYPE (H): ICD-10-CM

## 2017-10-10 DIAGNOSIS — F32.0 MILD MAJOR DEPRESSION (H): ICD-10-CM

## 2017-10-10 DIAGNOSIS — I10 HYPERTENSION GOAL BP (BLOOD PRESSURE) < 140/90: ICD-10-CM

## 2017-10-10 PROCEDURE — 99213 OFFICE O/P EST LOW 20 MIN: CPT | Performed by: PHYSICIAN ASSISTANT

## 2017-10-10 RX ORDER — BUPROPION HYDROCHLORIDE 300 MG/1
TABLET ORAL
Qty: 90 TABLET | Refills: 1 | Status: SHIPPED | OUTPATIENT
Start: 2017-10-10 | End: 2018-04-09

## 2017-10-10 RX ORDER — METOPROLOL TARTRATE 25 MG/1
TABLET, FILM COATED ORAL
Qty: 180 TABLET | Refills: 1 | Status: SHIPPED | OUTPATIENT
Start: 2017-10-10 | End: 2018-06-26

## 2017-10-10 RX ORDER — HYDROCHLOROTHIAZIDE 25 MG/1
TABLET ORAL
Qty: 90 TABLET | Refills: 1 | Status: SHIPPED | OUTPATIENT
Start: 2017-10-10 | End: 2018-04-09

## 2017-10-10 RX ORDER — ATORVASTATIN CALCIUM 40 MG/1
TABLET, FILM COATED ORAL
Qty: 90 TABLET | Refills: 3 | Status: SHIPPED | OUTPATIENT
Start: 2017-10-10 | End: 2018-09-14

## 2017-10-10 RX ORDER — LOSARTAN POTASSIUM 50 MG/1
TABLET ORAL
Qty: 90 TABLET | Refills: 1 | Status: SHIPPED | OUTPATIENT
Start: 2017-10-10 | End: 2018-04-09

## 2017-10-10 ASSESSMENT — PATIENT HEALTH QUESTIONNAIRE - PHQ9: SUM OF ALL RESPONSES TO PHQ QUESTIONS 1-9: 0

## 2017-10-10 NOTE — MR AVS SNAPSHOT
After Visit Summary   10/10/2017    Sylvie Harvey    MRN: [id number]           Patient Information     Date Of Birth          1945        Visit Information        Provider Department      10/10/2017 9:20 AM Jesse Edgar PA-C [facility]        Today's Diagnoses     Hypertension goal BP (blood pressure) < 140/90        Coronary artery disease involving native heart with angina pectoris, unspecified vessel or lesion type (H)        Hyperlipidemia LDL goal <130        Mild major depression (H)           Follow-ups after your visit        Your next 10 appointments already scheduled     Oct 18, 2017  9:15 AM CDT   New Visit with Eric Arora DPM   [facility] ([facility])    [address] [address] [phone]              Who to contact     If you have questions or need follow up information about today's clinic visit or your schedule please contact [facility] directly at [phone].  Normal or non-critical lab and imaging results will be communicated to you by Mynglet, letter or phone within 4 business days after the clinic has received the results. If you do not hear from us within 7 days, please contact the clinic through wishkicker or phone. If you have a critical or abnormal lab result, we will notify you by phone as soon as possible.  Submit refill requests through wishkicker or call your pharmacy and they will forward the refill request to us. Please allow 3 business days for your refill to be completed.          Additional Information About Your Visit        FindIthart Information     wishkicker gives you secure access to your electronic health record. If you see a primary care provider, you can also send messages to your care team and make appointments. If you have questions, please call your primary care clinic.  If you do not have a primary care provider, please call [phone] and they will assist  "you.        Care EveryWhere ID     This is your Care EveryWhere ID. This could be used by other organizations to access your [facility] medical records  [id number]        Your Vitals Were     Pulse Height Pulse Oximetry BMI (Body Mass Index)          66 5' 9.5\" (1.765 m) 96% 30.57 kg/m2         Blood Pressure from Last 3 Encounters:   10/10/17 120/79   08/23/17 126/62   01/03/17 145/75    Weight from Last 3 Encounters:   10/10/17 210 lb (95.3 kg)   01/03/17 258 lb 6.4 oz (117.2 kg)   10/25/16 254 lb (115.2 kg)              We Performed the Following     DEPRESSION ACTION PLAN (DAP)          Today's Medication Changes          These changes are accurate as of: 10/10/17  9:37 AM.  If you have any questions, ask your nurse or doctor.               These medicines have changed or have updated prescriptions.        Dose/Directions    atorvastatin 40 MG tablet   Commonly known as:  LIPITOR   This may have changed:  See the new instructions.   Used for:  Coronary artery disease involving native heart with angina pectoris, unspecified vessel or lesion type (H), Hyperlipidemia LDL goal <130   Changed by:  Jesse Edgar PA-C        Take 1 tablet by mouth  daily   Quantity:  90 tablet   Refills:  3       buPROPion 300 MG 24 hr tablet   Commonly known as:  WELLBUTRIN XL   This may have changed:  See the new instructions.   Used for:  Mild major depression (H)   Changed by:  Jesse Edgar PA-C        Take 1 tablet by mouth  every morning   Quantity:  90 tablet   Refills:  1       hydrochlorothiazide 25 MG tablet   Commonly known as:  HYDRODIURIL   This may have changed:  See the new instructions.   Used for:  Hypertension goal BP (blood pressure) < 140/90   Changed by:  Jesse Edgar PA-C        Take 1 tablet by mouth  daily   Quantity:  90 tablet   Refills:  1       losartan 50 MG tablet   Commonly known as:  COZAAR   This may have changed:  See the new instructions.   Used for:  Hypertension goal BP " (blood pressure) < 140/90   Changed by:  Jesse Edgar PA-C        Take 1 tablet by mouth  daily   Quantity:  90 tablet   Refills:  1       metoprolol 25 MG tablet   Commonly known as:  LOPRESSOR   This may have changed:  See the new instructions.   Used for:  Hypertension goal BP (blood pressure) < 140/90   Changed by:  Jesse Edgar PA-C        Take 1 tablet by mouth two  times daily   Quantity:  180 tablet   Refills:  1            Where to get your medicines      These medications were sent to [facility] - [address], [facility] [address]     Phone:  [phone]     atorvastatin 40 MG tablet    buPROPion 300 MG 24 hr tablet    hydrochlorothiazide 25 MG tablet    losartan 50 MG tablet    metoprolol 25 MG tablet                Primary Care Provider Office Phone # Fax #    Jesse Edgar PA-C [phone] [address] [facility] [address]        Equal Access to Services     BIENVENIDO CARVAJAL : Hadii aad ku hadasho Soomaali, waaxda luqadaha, qaybta kaalmada adeegyada, waxay idiin hayaan adeeg kharamadan yi . So [facility] [phone].    ATENCIÓN: Si habla español, tiene a walker disposición servicios gratuitos de asistencia lingüística. [facility] [phone].    We comply with applicable federal civil rights laws and [place] laws. We do not discriminate on the basis of race, color, national origin, age, disability, sex, sexual orientation, or gender identity.            Thank you!     Thank you for choosing [facility]  for your care. Our goal is always to provide you with excellent care. Hearing back from our patients is one way we can continue to improve our services. Please take a few minutes to complete the written survey that you may receive in the mail after your visit with us. Thank you!             Your Updated Medication List - Protect others around you: Learn how to safely use, store and  throw away your medicines at www.disposemymeds.org.          This list is accurate as of: 10/10/17  9:37 AM.  Always use your most recent med list.                   Brand Name Dispense Instructions for use Diagnosis    aspirin 81 MG tablet      Take 1 tablet by mouth daily.        atorvastatin 40 MG tablet    LIPITOR    90 tablet    Take 1 tablet by mouth  daily    Coronary artery disease involving native heart with angina pectoris, unspecified vessel or lesion type (H), Hyperlipidemia LDL goal <130       buPROPion 300 MG 24 hr tablet    WELLBUTRIN XL    90 tablet    Take 1 tablet by mouth  every morning    Mild major depression (H)       hydrochlorothiazide 25 MG tablet    HYDRODIURIL    90 tablet    Take 1 tablet by mouth  daily    Hypertension goal BP (blood pressure) < 140/90       losartan 50 MG tablet    COZAAR    90 tablet    Take 1 tablet by mouth  daily    Hypertension goal BP (blood pressure) < 140/90       metoprolol 25 MG tablet    LOPRESSOR    180 tablet    Take 1 tablet by mouth two  times daily    Hypertension goal BP (blood pressure) < 140/90       nitroGLYcerin 0.4 MG sublingual tablet    NITROSTAT    10 tablet    Place 1 tablet (0.4 mg) under the tongue every 5 minutes as needed for chest pain (may repeat x2)    CAD (coronary artery disease)       prasugrel 10 MG Tabs tablet    EFFIENT    30 tablet    Take 1 tablet (10 mg) by mouth daily    Coronary artery disease involving native heart with angina pectoris, unspecified vessel or lesion type (H)

## 2017-10-10 NOTE — PROGRESS NOTES
SUBJECTIVE:                                                    Sylvie Harvey is a 72 year old male who presents to clinic today for the following health issues:    Hyperlipidemia Follow-Up      Rate your low fat/cholesterol diet?: not monitoring fat    Taking statin?  Yes, no muscle aches from statin    Other lipid medications/supplements?:  none  Has lost 50lbs with diet and exercise.     Hypertension Follow-up      Outpatient blood pressures are being checked at home.  Results are normal.    Low Salt Diet: not monitoring salt    Depression Followup    Status since last visit: Stable     See PHQ-9 for current symptoms.  Other associated symptoms: None    Complicating factors:   Significant life event:  No   Current substance abuse:  None  Anxiety or Panic symptoms:  No    History of CAD and see's cardiology yearly. Next apt in 2 wks with [facility].     PHQ-9 Score and MyChart F/U Questions 7/22/2016 3/28/2017 10/10/2017   Total Score 0 0 0   Q9: Suicide Ideation Not at all Not at all Not at all       PHQ-9  English  PHQ-9   Any Language  Suicide Assessment Five-step Evaluation and Treatment (SAFE-T)          Amount of exercise or physical activity: active     Problems taking medications regularly: No    Medication side effects: none  Diet: little to no carbs, pt is down almost 50 lbs       Problem list and histories reviewed & adjusted, as indicated.  Additional history: as documented    Patient Active Problem List   Diagnosis     Advanced directives, counseling/discussion     Hyperlipidemia LDL goal <130     Pain in shoulder     Plantar fasciitis     Medial meniscus tear     Coronary artery disease involving native heart with angina pectoris, unspecified vessel or lesion type (H)     Arthritis     Major depressive disorder, recurrent episode, mild (H)     Past Surgical History:   Procedure Laterality Date     APPENDECTOMY       ORTHOPEDIC SURGERY Right     Scar over dorsum aspect of right wrist.       ORTHOPEDIC SURGERY Right     Knee     ORTHOPEDIC SURGERY Bilateral     CTS release     SURGICAL HISTORY OF -       Umbilical Hernia     SURGICAL HISTORY OF -   2013    Coronary Stent       Social History   Substance Use Topics     Smoking status: Former Smoker     Quit date: 4/17/1983     Smokeless tobacco: Never Used     Alcohol use No      Comment: Quit 25 years ago (2013)     History reviewed. No pertinent family history.      Current Outpatient Prescriptions   Medication Sig Dispense Refill     losartan (COZAAR) 50 MG tablet Take 1 tablet by mouth  daily 90 tablet 1     atorvastatin (LIPITOR) 40 MG tablet Take 1 tablet by mouth  daily 90 tablet 3     metoprolol (LOPRESSOR) 25 MG tablet Take 1 tablet by mouth two  times daily 180 tablet 1     hydrochlorothiazide (HYDRODIURIL) 25 MG tablet Take 1 tablet by mouth  daily 90 tablet 1     buPROPion (WELLBUTRIN XL) 300 MG 24 hr tablet Take 1 tablet by mouth  every morning 90 tablet 1     prasugrel (EFFIENT) 10 MG TABS Take 1 tablet (10 mg) by mouth daily 30 tablet      aspirin 81 MG tablet Take 1 tablet by mouth daily.       [DISCONTINUED] losartan (COZAAR) 50 MG tablet Take 1 tablet by mouth  daily 90 tablet 1     [DISCONTINUED] atorvastatin (LIPITOR) 40 MG tablet Take 1 tablet by mouth  daily 90 tablet 1     [DISCONTINUED] metoprolol (LOPRESSOR) 25 MG tablet Take 1 tablet by mouth two  times daily 180 tablet 1     [DISCONTINUED] hydrochlorothiazide (HYDRODIURIL) 25 MG tablet Take 1 tablet by mouth  daily 90 tablet 1     [DISCONTINUED] buPROPion (WELLBUTRIN XL) 300 MG 24 hr tablet Take 1 tablet by mouth  every morning 90 tablet 1     nitroglycerin (NITROSTAT) 0.4 MG sublingual tablet Place 1 tablet (0.4 mg) under the tongue every 5 minutes as needed for chest pain (may repeat x2) (Patient not taking: Reported on 10/10/2017) 10 tablet 3     Allergies   Allergen Reactions     Sulfa Drugs      BP Readings from Last 3 Encounters:   10/10/17 120/79   08/23/17 126/62  "  01/03/17 145/75    Wt Readings from Last 3 Encounters:   10/10/17 210 lb (95.3 kg)   01/03/17 258 lb 6.4 oz (117.2 kg)   10/25/16 254 lb (115.2 kg)                  Labs reviewed in EPIC        ROS:  Constitutional, HEENT, cardiovascular, pulmonary, gi and gu systems are negative, except as otherwise noted.      OBJECTIVE:   /79  Pulse 66  Ht 5' 9.5\" (1.765 m)  Wt 210 lb (95.3 kg)  SpO2 96%  BMI 30.57 kg/m2  Body mass index is 30.57 kg/(m^2).  GENERAL: healthy, alert and no distress  EYES: Eyes grossly normal to inspection, PERRL and conjunctivae and sclerae normal  HENT: ear canals and TM's normal, nose and mouth without ulcers or lesions  NECK: no adenopathy, no asymmetry, masses, or scars and thyroid normal to palpation  RESP: lungs clear to auscultation - no rales, rhonchi or wheezes  CV: regular rate and rhythm, normal S1 S2, no S3 or S4, no murmur, click or rub, no peripheral edema and peripheral pulses strong  ABDOMEN: soft, nontender, no hepatosplenomegaly, no masses and bowel sounds normal  MS: no gross musculoskeletal defects noted, no edema    Diagnostic Test Results:  none     ASSESSMENT/PLAN:         ICD-10-CM    1. Hypertension goal BP (blood pressure) < 140/90 I10 losartan (COZAAR) 50 MG tablet     metoprolol (LOPRESSOR) 25 MG tablet     hydrochlorothiazide (HYDRODIURIL) 25 MG tablet   2. Coronary artery disease involving native heart with angina pectoris, unspecified vessel or lesion type (H) I25.119 atorvastatin (LIPITOR) 40 MG tablet   3. Hyperlipidemia LDL goal <130 E78.5 atorvastatin (LIPITOR) 40 MG tablet   4. Mild major depression (H) F32.0 buPROPion (WELLBUTRIN XL) 300 MG 24 hr tablet   meds refilled.  con't Work on Healthy diet and exercise. Getting heart rate elevated for 30 mins most days of week.   Recheck 6 months.     Jesse Edgar PA-C  [facility]  "

## 2017-10-10 NOTE — LETTER
My Depression Action Plan  Name: Sylvie Harvey   Date of Birth 1945  Date: 10/10/2017    My doctor: Jesse Edgar   My clinic: [facility]  [id number] [place] [address] [phone]          GREEN    ZONE   Good Control    What it looks like:     Things are going generally well. You have normal up s and down s. You may even feel depressed from time to time, but bad moods usually last less than a day.   What you need to do:  1. Continue to care for yourself (see self care plan)  2. Check your depression survival kit and update it as needed  3. Follow your physician s recommendations including any medication.  4. Do not stop taking medication unless you consult with your physician first.           YELLOW         ZONE Getting Worse    What it looks like:     Depression is starting to interfere with your life.     It may be hard to get out of bed; you may be starting to isolate yourself from others.    Symptoms of depression are starting to last most all day and this has happened for several days.     You may have suicidal thoughts but they are not constant.   What you need to do:     1. Call your care team, your response to treatment will improve if you keep your care team informed of your progress. Yellow periods are signs an adjustment may need to be made.     2. Continue your self-care, even if you have to fake it!    3. Talk to someone in your support network    4. Open up your depression survival kit           RED    ZONE Medical Alert - Get Help    What it looks like:     Depression is seriously interfering with your life.     You may experience these or other symptoms: You can t get out of bed most days, can t work or engage in other necessary activities, you have trouble taking care of basic hygiene, or basic responsibilities, thoughts of suicide or death that will not go away, self-injurious behavior.     What you need to do:  1. Call your care team  and request a same-day appointment. If they are not available (weekends or after hours) call your local crisis line, emergency room or 911.      Electronically signed by: Anastacia Jauregui, October 10, 2017    Depression Self Care Plan / Survival Kit    Self-Care for Depression  Here s the deal. Your body and mind are really not as separate as most people think.  What you do and think affects how you feel and how you feel influences what you do and think. This means if you do things that people who feel good do, it will help you feel better.  Sometimes this is all it takes.  There is also a place for medication and therapy depending on how severe your depression is, so be sure to consult with your medical provider and/ or Behavioral Health Consultant if your symptoms are worsening or not improving.     In order to better manage my stress, I will:    Exercise  Get some form of exercise, every day. This will help reduce pain and release endorphins, the  feel good  chemicals in your brain. This is almost as good as taking antidepressants!  This is not the same as joining a gym and then never going! (they count on that by the way ) It can be as simple as just going for a walk or doing some gardening, anything that will get you moving.      Hygiene   Maintain good hygiene (Get out of bed in the morning, Make your bed, Brush your teeth, Take a shower, and Get dressed like you were going to work, even if you are unemployed).  If your clothes don't fit try to get ones that do.    Diet  I will strive to eat foods that are good for me, drink plenty of water, and avoid excessive sugar, caffeine, alcohol, and other mood-altering substances.  Some foods that are helpful in depression are: complex carbohydrates, B vitamins, flaxseed, fish or fish oil, fresh fruits and vegetables.    Psychotherapy  I agree to participate in Individual Therapy (if recommended).    Medication  If prescribed medications, I agree to take them.  Missing  doses can result in serious side effects.  I understand that drinking alcohol, or other illicit drug use, may cause potential side effects.  I will not stop my medication abruptly without first discussing it with my provider.    Staying Connected With Others  I will stay in touch with my friends, family members, and my primary care provider/team.    Use your imagination  Be creative.  We all have a creative side; it doesn t matter if it s oil painting, sand castles, or mud pies! This will also kick up the endorphins.    Witness Beauty  (AKA stop and smell the roses) Take a look outside, even in mid-winter. Notice colors, textures. Watch the squirrels and birds.     Service to others  Be of service to others.  There is always someone else in need.  By helping others we can  get out of ourselves  and remember the really important things.  This also provides opportunities for practicing all the other parts of the program.    Humor  Laugh and be silly!  Adjust your TV habits for less news and crime-drama and more comedy.    Control your stress  Try breathing deep, massage therapy, biofeedback, and meditation. Find time to relax each day.     My support system    Clinic Contact:  Phone number:    Contact 1:  Phone number:    Contact 2:  Phone number:    Alevism/:  Phone number:    Therapist:  Phone number:    Local crisis center:    Phone number:    Other community support:  Phone number:

## 2017-10-10 NOTE — NURSING NOTE
"Chief Complaint   Patient presents with     Hypertension     Lipids     Depression       Initial /79  Pulse 66  Ht 5' 9.5\" (1.765 m)  Wt 210 lb (95.3 kg)  SpO2 96%  BMI 30.57 kg/m2 Estimated body mass index is 30.57 kg/(m^2) as calculated from the following:    Height as of this encounter: 5' 9.5\" (1.765 m).    Weight as of this encounter: 210 lb (95.3 kg).  Medication Reconciliation: complete  Anastacia Gallagher CMA    "

## 2017-10-11 NOTE — PROGRESS NOTES
Alison Mr. Harvey,     Ferritin level is good now. Please stop taking iron and vitamin C supplements. Follow up as suggested during your recent office visit    Thanks,       Lenard Valerio MD, [facility]  Neurologist

## 2017-10-18 ENCOUNTER — OFFICE VISIT (OUTPATIENT)
Dept: PODIATRY | Facility: CLINIC | Age: 72
End: 2017-10-18
Payer: COMMERCIAL

## 2017-10-18 VITALS — OXYGEN SATURATION: 98 % | BODY MASS INDEX: 30.57 KG/M2 | WEIGHT: 210 LBS | HEART RATE: 74 BPM

## 2017-10-18 DIAGNOSIS — G62.9 PERIPHERAL POLYNEUROPATHY: Primary | ICD-10-CM

## 2017-10-18 DIAGNOSIS — M79.672 PAIN IN BOTH FEET: ICD-10-CM

## 2017-10-18 DIAGNOSIS — M79.671 PAIN IN BOTH FEET: ICD-10-CM

## 2017-10-18 PROCEDURE — 99203 OFFICE O/P NEW LOW 30 MIN: CPT | Performed by: PODIATRIST

## 2017-10-18 NOTE — NURSING NOTE
"Chief Complaint   Patient presents with     Foot Pain     Burning feeling. Rex feet due to walking at work       Initial Pulse 74  Wt 95.3 kg (210 lb)  SpO2 98%  BMI 30.57 kg/m2 Estimated body mass index is 30.57 kg/(m^2) as calculated from the following:    Height as of 10/10/17: 1.765 m (5' 9.5\").    Weight as of this encounter: 95.3 kg (210 lb).  Medication Reconciliation: complete  "

## 2017-10-18 NOTE — LETTER
10/18/2017         RE: Sylvie Harvey  [address]  [place] [address]        Dear Colleague,    Thank you for referring your patient, Sylvie Harvey, to the [facility]. Please see a copy of my visit note below.    Subjective:    Pt is seen today with chief complaint of numbness and burning in both feet.  It is constant and always present.  Has had this for number of years.  Slowly getting worse.  The pain is aggravated by activity.  Can also be bothersome in bed at night.  Nothing seems to relieve this.  He has been seen by neurology and diagnosed with idiopathic peripheral neuropathy.  States no OH in over 25 years.              ROS:  No hx of wound healing problems or foot ulcers.  Admits bilateral numbness.  Denies edema, erythema, drainage, or history or trauma.       Allergies   Allergen Reactions     Sulfa Drugs        Current Outpatient Prescriptions   Medication Sig Dispense Refill     losartan (COZAAR) 50 MG tablet Take 1 tablet by mouth  daily 90 tablet 1     atorvastatin (LIPITOR) 40 MG tablet Take 1 tablet by mouth  daily 90 tablet 3     metoprolol (LOPRESSOR) 25 MG tablet Take 1 tablet by mouth two  times daily 180 tablet 1     hydrochlorothiazide (HYDRODIURIL) 25 MG tablet Take 1 tablet by mouth  daily 90 tablet 1     buPROPion (WELLBUTRIN XL) 300 MG 24 hr tablet Take 1 tablet by mouth  every morning 90 tablet 1     nitroglycerin (NITROSTAT) 0.4 MG sublingual tablet Place 1 tablet (0.4 mg) under the tongue every 5 minutes as needed for chest pain (may repeat x2) 10 tablet 3     prasugrel (EFFIENT) 10 MG TABS Take 1 tablet (10 mg) by mouth daily 30 tablet      aspirin 81 MG tablet Take 1 tablet by mouth daily.         Patient Active Problem List   Diagnosis     Advanced directives, counseling/discussion     Hyperlipidemia LDL goal <130     Pain in shoulder     Plantar fasciitis     Medial meniscus tear     Coronary artery disease involving native heart with  "angina pectoris, unspecified vessel or lesion type (H)     Arthritis     Major depressive disorder, recurrent episode, mild (H)       Past Medical History:   Diagnosis Date     Alcohol abuse     Quit over 25 yrs ago     Arthritis      Drug abuse     Quit over 25 yrs ago     Headaches      Hypertension        Past Surgical History:   Procedure Laterality Date     APPENDECTOMY       ORTHOPEDIC SURGERY Right     Scar over dorsum aspect of right wrist.      ORTHOPEDIC SURGERY Right     Knee     ORTHOPEDIC SURGERY Bilateral     CTS release     SURGICAL HISTORY OF -       Umbilical Hernia     SURGICAL HISTORY OF -   2013    Coronary Stent       No family history on file.    Social History   Substance Use Topics     Smoking status: Former Smoker     Quit date: 4/17/1983     Smokeless tobacco: Never Used     Alcohol use No      Comment: Quit 25 years ago (2013)         Exam:    Pulse 74  Wt 95.3 kg (210 lb)  SpO2 98%  BMI 30.57 kg/m2.  Patient a good historian.  A&O X 3.  Pulses DP, PT 2/4 b/l.  CRT < 3 seconds X 10 digits.   {ALLERGENS:623113::\"positive\"} edema or varicosities noted.  5.07 monofilament  Intact on all digits b/l.  Reflexes 2/4 b/l.  Skin healthy with hair growth noted b/l.  Normal arch with weightbearing.  No forefoot or rear foot deformities noted.  MS 5/5 all compartments.  Normal ROM all fore foot and rearfoot joints.  No erythema or ecchymosis noted bilateral.  No subcutaneous masses noted.  No pain on palpation anywhere bilateral.  Recent labs noted.  Past foot x-rays normal.    A/P  Peripheral neuropathy causing pain    Personally reviewed past x-rays and labs.  Discussed cause of this with patient and that unfortunately some people get peripheral neuropathy as they age.  Discussed  Topicals.  He will start with icy hot.  He will call if he would like to try capsaicin cream.  Also discussed TENS unit and orthotics.  Return to clinic prn.            rEic Arora DPM, FACFAS            Again, " thank you for allowing me to participate in the care of your patient.        Sincerely,        Eric Arora DPM

## 2017-10-18 NOTE — PATIENT INSTRUCTIONS
We wish you continued good healing. If you have any questions or concerns, please do not hesitate to contact us at [phone]      Please remember to call and schedule a follow up appointment if one was recommended at your earliest convenience.   PODIATRY CLINIC HOURS  TELEPHONE NUMBER    Dr. Eric Arora D.P.M [facility]    Clinics:  [facility]        Goldie Rodrigues MA  Medical Assistant  Tuesday 1PM-6PM  [facility]  Wednesday 7AM-2PM  [place]  Thursday 10AM-6PM  [place] Friday 7AM-345PM  [place]  Specialty schedulers:   [phone] to make an appointment with any Specialty Provider.        Urgent Care locations:    [facility] Monday-Friday 5 pm - 9 pm. Saturday-Sunday 9 am -5pm    Monday-Friday 11 am - 9 pm Saturday 9 am - 5 pm     Monday-Sunday 12 noon-8PM [phone]     If you need a medication refill, please contact us you may need lab work and/or a follow up visit prior to your refill (i.e. Antifungal medications).    MyQuoteAppt (secure e-mail communication and access to your chart) to send a message or to make an appointment.    If MRI needed please call [facility] at [phone]        Weight management plan: Patient was referred to their PCP to discuss a diet and exercise plan.

## 2017-10-18 NOTE — MR AVS SNAPSHOT
After Visit Summary   10/18/2017    Sylvie Harvey    MRN: [id number]           Patient Information     Date Of Birth          1945        Visit Information        Provider Department      10/18/2017 9:15 AM Eric Arora DPM [facility]        Today's Diagnoses     Peripheral polyneuropathy    -  1    Pain in both feet          Care Instructions    We wish you continued good healing. If you have any questions or concerns, please do not hesitate to contact us at [phone]      Please remember to call and schedule a follow up appointment if one was recommended at your earliest convenience.   PODIATRY CLINIC HOURS  TELEPHONE NUMBER    Dr. Eric VALDEZPSVITLANA FAC FAS    Clinics:  [facility]        Goldie Rodrigues MA  Medical Assistant  Tuesday 1PM-6PM  [place]  Wednesday 7AM-2PM  [place]  Thursday 10AM-6PM  [place]  Friday 7AM-345PM  [place]  Specialty schedulers:   [phone] to make an appointment with any Specialty Provider.        Urgent Care locations:    [facility] Monday-Friday 5 pm - 9 pm. Saturday-Sunday 9 am -5pm    Monday-Friday 11 am - 9 pm Saturday 9 am - 5 pm     Monday-Sunday 12 noon-8PM [phone]     If you need a medication refill, please contact us you may need lab work and/or a follow up visit prior to your refill (i.e. Antifungal medications).    CiviQhart (secure e-mail communication and access to your chart) to send a message or to make an appointment.    If MRI needed please call [facility] at [phone]        Weight management plan: Patient was referred to their PCP to discuss a diet and exercise plan.            Follow-ups after your visit        Your next 10 appointments already scheduled     Oct 24, 2017  4:00 PM CDT   Return Visit with Lenard Valerio MD   [facility]  ([facility])    [address] [facility] [place] [address] [phone]              Who to contact     If you have questions or need follow up information about today's clinic visit or your schedule please contact [facility] directly at [phone].  Normal or non-critical lab and imaging results will be communicated to you by MyChart, letter or phone within 4 business days after the clinic has received the results. If you do not hear from us within 7 days, please contact the clinic through MyChart or phone. If you have a critical or abnormal lab result, we will notify you by phone as soon as possible.  Submit refill requests through [employer] or call your pharmacy and they will forward the refill request to us. Please allow 3 business days for your refill to be completed.          Additional Information About Your Visit        MyChart Information     [employer] gives you secure access to your electronic health record. If you see a primary care provider, you can also send messages to your care team and make appointments. If you have questions, please call your primary care clinic.  If you do not have a primary care provider, please call [phone] and they will assist you.        Care EveryWhere ID     This is your Care EveryWhere ID. This could be used by other organizations to access your [place] medical records  [id number]        Your Vitals Were     Pulse Pulse Oximetry BMI (Body Mass Index)             74 98% 30.57 kg/m2          Blood Pressure from Last 3 Encounters:   10/10/17 120/79   08/23/17 126/62   01/03/17 145/75    Weight from Last 3 Encounters:   10/18/17 95.3 kg (210 lb)   10/10/17 95.3 kg (210 lb)   01/03/17 117.2 kg (258 lb 6.4 oz)              Today, you had the following     No orders found for display       Primary Care Provider Office Phone # Fax #    Jesse Edgar PA-C [phone] [address]  [facility] [address] [address]  Access to Services     [facility]: Hadii aad ku hadyennysolitario Lauraali, wakerida luqadaha, qaybta kamasonmaribel costello. So [facility] [phone].    ATENCIÓN: Si habla español, tiene a walker disposición servicios gratuitos de asistencia lingüística. Llame al [phone].    We comply with applicable federal civil rights laws and [place] laws. We do not discriminate on the basis of race, color, national origin, age, disability, sex, sexual orientation, or gender identity.            Thank you!     Thank you for choosing [facility]  [facility] your [facility]. Our goal is always to provide you with excellent care. Hearing back from our patients is one way we can continue to improve our services. Please take a few minutes to complete the written survey that you may receive in the mail after your visit with us. Thank you!             Your Updated Medication List - Protect others around you: Learn how to safely use, store and throw away your medicines at www.disposemymeds.org.          This list is accurate as of: 10/18/17 11:59 PM.  Always use your most recent med list.                   Brand Name Dispense Instructions for use Diagnosis    aspirin 81 MG tablet      Take 1 tablet by mouth daily.        atorvastatin 40 MG tablet    LIPITOR    90 tablet    Take 1 tablet by mouth  daily    Coronary artery disease involving native heart with angina pectoris, unspecified vessel or lesion type (H), Hyperlipidemia LDL goal <130       buPROPion 300 MG 24 hr tablet    WELLBUTRIN XL    90 tablet    Take 1 tablet by mouth  every morning    Mild major depression (H)       hydrochlorothiazide 25 MG tablet    HYDRODIURIL    90 tablet    Take 1 tablet by mouth  daily    Hypertension goal BP (blood pressure) < 140/90       losartan 50 MG tablet    COZAAR    90 tablet    Take 1 tablet by mouth  daily    Hypertension goal BP (blood pressure) < 140/90       metoprolol 25 MG tablet    LOPRESSOR    180  tablet    Take 1 tablet by mouth two  times daily    Hypertension goal BP (blood pressure) < 140/90       nitroGLYcerin 0.4 MG sublingual tablet    NITROSTAT    10 tablet    Place 1 tablet (0.4 mg) under the tongue every 5 minutes as needed for chest pain (may repeat x2)    CAD (coronary artery disease)       prasugrel 10 MG Tabs tablet    EFFIENT    30 tablet    Take 1 tablet (10 mg) by mouth daily    Coronary artery disease involving native heart with angina pectoris, unspecified vessel or lesion type (H)

## 2017-10-19 NOTE — PROGRESS NOTES
Subjective:    Pt is seen today with chief complaint of numbness and burning in both feet.  It is constant and always present.  Has had this for number of years.  Slowly getting worse.  The pain is aggravated by activity.  Can also be bothersome in bed at night.  Nothing seems to relieve this.  He has been seen by neurology and diagnosed with idiopathic peripheral neuropathy.  States no OH in over 25 years.              ROS:  No hx of wound healing problems or foot ulcers.  Admits bilateral numbness.  Denies edema, erythema, drainage, or history or trauma.       Allergies   Allergen Reactions     Sulfa Drugs        Current Outpatient Prescriptions   Medication Sig Dispense Refill     losartan (COZAAR) 50 MG tablet Take 1 tablet by mouth  daily 90 tablet 1     atorvastatin (LIPITOR) 40 MG tablet Take 1 tablet by mouth  daily 90 tablet 3     metoprolol (LOPRESSOR) 25 MG tablet Take 1 tablet by mouth two  times daily 180 tablet 1     hydrochlorothiazide (HYDRODIURIL) 25 MG tablet Take 1 tablet by mouth  daily 90 tablet 1     buPROPion (WELLBUTRIN XL) 300 MG 24 hr tablet Take 1 tablet by mouth  every morning 90 tablet 1     nitroglycerin (NITROSTAT) 0.4 MG sublingual tablet Place 1 tablet (0.4 mg) under the tongue every 5 minutes as needed for chest pain (may repeat x2) 10 tablet 3     prasugrel (EFFIENT) 10 MG TABS Take 1 tablet (10 mg) by mouth daily 30 tablet      aspirin 81 MG tablet Take 1 tablet by mouth daily.         Patient Active Problem List   Diagnosis     Advanced directives, counseling/discussion     Hyperlipidemia LDL goal <130     Pain in shoulder     Plantar fasciitis     Medial meniscus tear     Coronary artery disease involving native heart with angina pectoris, unspecified vessel or lesion type (H)     Arthritis     Major depressive disorder, recurrent episode, mild (H)       Past Medical History:   Diagnosis Date     Alcohol abuse     Quit over 25 yrs ago     Arthritis      Drug abuse     Quit over  25 yrs ago     Headaches      Hypertension        Past Surgical History:   Procedure Laterality Date     APPENDECTOMY       ORTHOPEDIC SURGERY Right     Scar over dorsum aspect of right wrist.      ORTHOPEDIC SURGERY Right     Knee     ORTHOPEDIC SURGERY Bilateral     CTS release     SURGICAL HISTORY OF -       Umbilical Hernia     SURGICAL HISTORY OF -   2013    Coronary Stent       No family history on file.    Social History   Substance Use Topics     Smoking status: Former Smoker     Quit date: 4/17/1983     Smokeless tobacco: Never Used     Alcohol use No      Comment: Quit 25 years ago (2013)         Exam:    Pulse 74  Wt 95.3 kg (210 lb)  SpO2 98%  BMI 30.57 kg/m2.  Patient a good historian.  A&O X 3.  Pulses DP, PT 2/4 b/l.  CRT < 3 seconds X 10 digits.   positive edema or varicosities noted.  5.07 monofilament  Intact on all digits b/l.  Reflexes 2/4 b/l.  Skin healthy with hair growth noted b/l.  Normal arch with weightbearing.  No forefoot or rear foot deformities noted.  MS 5/5 all compartments.  Normal ROM all fore foot and rearfoot joints.  No erythema or ecchymosis noted bilateral.  No subcutaneous masses noted.  No pain on palpation anywhere bilateral.  Recent labs noted.  Past foot x-rays normal.    A/P  Peripheral neuropathy causing pain    Personally reviewed past x-rays and labs.  Discussed cause of this with patient and that unfortunately some people get peripheral neuropathy as they age.  Discussed  Topicals.  He will start with icy hot.  He will call if he would like to try capsaicin cream.  Also discussed TENS unit and orthotics.  Return to clinic prn.            Eric Arora DPM, FACFAS

## 2017-10-24 ENCOUNTER — OFFICE VISIT (OUTPATIENT)
Dept: NEUROLOGY | Facility: CLINIC | Age: 72
End: 2017-10-24
Payer: COMMERCIAL

## 2017-10-24 VITALS
HEART RATE: 72 BPM | SYSTOLIC BLOOD PRESSURE: 137 MMHG | WEIGHT: 211 LBS | DIASTOLIC BLOOD PRESSURE: 67 MMHG | BODY MASS INDEX: 30.71 KG/M2

## 2017-10-24 DIAGNOSIS — R79.89 LOW VITAMIN B12 LEVEL: ICD-10-CM

## 2017-10-24 DIAGNOSIS — G25.81 RESTLESS LEGS SYNDROME (RLS): ICD-10-CM

## 2017-10-24 DIAGNOSIS — G60.9 IDIOPATHIC PERIPHERAL NEUROPATHY: Primary | ICD-10-CM

## 2017-10-24 PROCEDURE — 99213 OFFICE O/P EST LOW 20 MIN: CPT | Performed by: PSYCHIATRY & NEUROLOGY

## 2017-10-24 RX ORDER — GABAPENTIN 400 MG/1
400 CAPSULE ORAL 3 TIMES DAILY
Qty: 270 CAPSULE | Refills: 3 | Status: SHIPPED | OUTPATIENT
Start: 2017-10-24 | End: 2018-04-09

## 2017-10-24 RX ORDER — PRAMIPEXOLE DIHYDROCHLORIDE 0.12 MG/1
TABLET ORAL
Qty: 270 TABLET | Refills: 0 | Status: SHIPPED | OUTPATIENT
Start: 2017-10-24 | End: 2018-04-24

## 2017-10-24 RX ORDER — CYANOCOBALAMIN (VITAMIN B-12) 2500 MCG
2500 TABLET, SUBLINGUAL SUBLINGUAL DAILY
Qty: 90 TABLET | Refills: 1 | Status: SHIPPED | OUTPATIENT
Start: 2017-10-24 | End: 2018-04-24

## 2017-10-24 NOTE — MR AVS SNAPSHOT
After Visit Summary   10/24/2017    Sylvie Harvey    MRN: [id number]           Patient Information     Date Of Birth          1945        Visit Information        Provider Department      10/24/2017 4:00 PM Lenard Tim MD [facility] [place]        Today's Diagnoses     Idiopathic peripheral neuropathy    -  1    Restless legs syndrome (RLS)        Low vitamin B12 level          Care Instructions    AFTER VISIT SUMMARY (AVS)  Signed Prescriptions:                        Disp   Refills    gabapentin (NEURONTIN) 400 MG capsule      270 ca*3        Sig: Take 1 capsule (400 mg) by mouth 3 times daily  Authorizing Provider: LENARD TIM    pramipexole (MIRAPEX) 0.125 MG tablet      270 ta*0        Sig: Week 1 and 2: One tablet one hour before going to           bed. We 3 and 4: One tablet 2 times a day. Week 5           and afterwards: 1-3 times a day  Authorizing Provider: LENARD TIM    cyabnocobalamin (VITAMIN B-12) 2500 MCG walker*90 tab*1        Sig: Place 2,500 mcg under the tongue daily  Authorizing Provider: LENARD TIM    Feet care    Diagnostic possibilities reviewed     Preventive Neurology: Encouraged to keep physically and mentally active with particular emphasis on daily stretching exercises, walking, and healthy eating.    To call us for follow-up appointment in next 6 week(s) or earlier if needed.                        Follow-ups after your visit        Follow-up notes from your care team     Return in about 6 weeks (around 12/5/2017) for Follow-up.      Who to contact     If you have questions or need follow up information about today's clinic visit or your schedule please contact [facility] directly at [phone].  Normal or non-critical lab and imaging results will be communicated to you by MyChart, letter or phone within 4 business days after the clinic has received the results. If you do not hear from us within 7 days, please contact the  clinic through [employer] or phone. If you have a critical or abnormal lab result, we will notify you by phone as soon as possible.  Submit refill requests through [employer] or call your pharmacy and they will forward the refill request to us. Please allow 3 business days for your refill to be completed.          Additional Information About Your Visit        MyTennisLessonsharGetui Information     [employer] gives you secure access to your electronic health record. If you see a primary care provider, you can also send messages to your care team and make appointments. If you have questions, please call your primary care clinic.  If you do not have a primary care provider, please call [phone] and they will assist you.        Care EveryWhere ID     This is your Care EveryWhere ID. This could be used by other organizations to access your [place] medical records  [id number]        Your Vitals Were     Pulse BMI (Body Mass Index)                72 30.71 kg/m2           Blood Pressure from Last 3 Encounters:   10/24/17 137/67   10/10/17 120/79   08/23/17 126/62    Weight from Last 3 Encounters:   10/24/17 95.7 kg (211 lb)   10/18/17 95.3 kg (210 lb)   10/10/17 95.3 kg (210 lb)              Today, you had the following     No orders found for display         Today's Medication Changes          These changes are accurate as of: 10/24/17 11:59 PM.  If you have any questions, ask your nurse or doctor.               Start taking these medicines.        Dose/Directions    cyabnocobalamin 2500 MCG sublingual tablet   Commonly known as:  VITAMIN B-12   Used for:  Low vitamin B12 level   Started by:  Lenard Valerio MD        Dose:  2500 mcg   Place 2,500 mcg under the tongue daily   Quantity:  90 tablet   Refills:  1       gabapentin 400 MG capsule   Commonly known as:  NEURONTIN   Used for:  Idiopathic peripheral neuropathy   Started by:  Lenard Valerio MD        Dose:  400 mg   Take 1 capsule (400 mg) by mouth 3 times daily   Quantity:   270 capsule   Refills:  3       pramipexole 0.125 MG tablet   Commonly known as:  MIRAPEX   Used for:  Restless legs syndrome (RLS)   Started by:  Lenard Valerio MD        Week 1 and 2: One tablet one hour before going to bed. We 3 and 4: One tablet 2 times a day. Week 5 and afterwards: 1-3 times a day   Quantity:  270 tablet   Refills:  0            Where to get your medicines      These medications were sent to [facility] [address], [address]  [address] [address], [facility] [address], [facility] [address]     Phone:  [phone]     cyabnocobalamin 2500 MCG sublingual tablet    gabapentin 400 MG capsule    pramipexole 0.125 MG tablet                Primary Care Provider Office Phone # Fax #    Jesse Edgar PA-C [phone] [address] [address] [address]        Equal Access to Services     [facility] CAR[facility]: Hadii jason ku hadasho Soomaali, waaxda luqadaha, qaybta kaalmada adeegyada, waxay slyin hayyoselynn aileen yi . So [facility] [phone].    ATENCIÓN: Si zacla esplindsey, tiene a walker disposición servicios gratuitos de asistencia lingüística. Llame al [phone].    We comply with applicable federal civil rights laws and [place] laws. We do not discriminate on the basis of race, color, national origin, age, disability, sex, sexual orientation, or gender identity.            Thank you!     Thank you for choosing [facility]  for your care. Our goal is always to provide you with excellent care. Hearing back from our patients is one way we can continue to improve our services. Please take a few minutes to complete the written survey that you may receive in the mail after your visit with us. Thank you!             Your Updated Medication List - Protect others around you: Learn how to safely use, store and throw away your medicines at www.disposemymeds.org.          This list is accurate as of: 10/24/17 11:59 PM.  Always use your most recent med  list.                   Brand Name Dispense Instructions for use Diagnosis    aspirin 81 MG tablet      Take 1 tablet by mouth daily.        atorvastatin 40 MG tablet    LIPITOR    90 tablet    Take 1 tablet by mouth  daily    Coronary artery disease involving native heart with angina pectoris, unspecified vessel or lesion type (H), Hyperlipidemia LDL goal <130       buPROPion 300 MG 24 hr tablet    WELLBUTRIN XL    90 tablet    Take 1 tablet by mouth  every morning    Mild major depression (H)       cyabnocobalamin 2500 MCG sublingual tablet    VITAMIN B-12    90 tablet    Place 2,500 mcg under the tongue daily    Low vitamin B12 level       gabapentin 400 MG capsule    NEURONTIN    270 capsule    Take 1 capsule (400 mg) by mouth 3 times daily    Idiopathic peripheral neuropathy       hydrochlorothiazide 25 MG tablet    HYDRODIURIL    90 tablet    Take 1 tablet by mouth  daily    Hypertension goal BP (blood pressure) < 140/90       losartan 50 MG tablet    COZAAR    90 tablet    Take 1 tablet by mouth  daily    Hypertension goal BP (blood pressure) < 140/90       metoprolol 25 MG tablet    LOPRESSOR    180 tablet    Take 1 tablet by mouth two  times daily    Hypertension goal BP (blood pressure) < 140/90       nitroGLYcerin 0.4 MG sublingual tablet    NITROSTAT    10 tablet    Place 1 tablet (0.4 mg) under the tongue every 5 minutes as needed for chest pain (may repeat x2)    CAD (coronary artery disease)       pramipexole 0.125 MG tablet    MIRAPEX    270 tablet    Week 1 and 2: One tablet one hour before going to bed. We 3 and 4: One tablet 2 times a day. Week 5 and afterwards: 1-3 times a day    Restless legs syndrome (RLS)       prasugrel 10 MG Tabs tablet    EFFIENT    30 tablet    Take 1 tablet (10 mg) by mouth daily    Coronary artery disease involving native heart with angina pectoris, unspecified vessel or lesion type (H)

## 2017-10-24 NOTE — PROGRESS NOTES
"                      ESTABLISHED PATIENT NEUROLOGY NOTE    LOCATION: [facility]  DATE OF VISIT: 10/24/2017    NAME: Mr.Harlow BRII Harvey  : 1945 (72 year old)  MR #: [id number]    PRIMARY/REFERRING PROVIDER: Jesse Edgar PA-C    REASON FOR VISIT: Peripheral neuropathy    HISTORY OF PRESENT ILLNESS: 72-year-old man with the symptoms of peripheral neuropathy and suspected for restless leg syndrome also. He was started on gabapentin. Currently he has been taking Gabapentin 400 mg 3 times a day and he relates that he is not helping him.   On further inquiry he relates that he has symptoms of\" crawling sensation over his legs\" and urge to wiggle his toes but denies any urge to keep pacing in the house. He has been taking iron supplement because of his very low ferritin previously. Current ferritin is 161. Previous ferritin was 9. He also takes vitamin B-12 supplement.    Recent Diagnostic Studies reviewed:     Blood tests:    Orders Only on 10/05/2017   Component Date Value Ref Range Status     Cholesterol 10/05/2017 114  <200 mg/dL Final     Triglycerides 10/05/2017 78  <150 mg/dL Final    Fasting specimen     HDL Cholesterol 10/05/2017 51  >39 mg/dL Final     LDL Cholesterol Calculated 10/05/2017 47  <100 mg/dL Final    Desirable:       <100 mg/dl     Non HDL Cholesterol 10/05/2017 63  <130 mg/dL Final     Sodium 10/05/2017 137  133 - 144 mmol/L Final     Potassium 10/05/2017 4.4  3.4 - 5.3 mmol/L Final     Chloride 10/05/2017 102  94 - 109 mmol/L Final     Carbon Dioxide 10/05/2017 27  20 - 32 mmol/L Final     Anion Gap 10/05/2017 8  3 - 14 mmol/L Final     Glucose 10/05/2017 95  70 - 99 mg/dL Final    Fasting specimen     Urea Nitrogen 10/05/2017 45* 7 - 30 mg/dL Final     Creatinine 10/05/2017 1.39* 0.66 - 1.25 mg/dL Final     GFR Estimate 10/05/2017 50* >60 mL/min/1.7m2 Final    Non  GFR Calc     GFR Estimate If Black 10/05/2017 61  >60 mL/min/1.7m2 Final     " GFR Calc     Calcium 10/05/2017 9.2  8.5 - 10.1 mg/dL Final     Ferritin 10/05/2017 161  26 - 388 ng/mL Final       Allergies   Allergen Reactions     Sulfa Drugs      Current Outpatient Prescriptions   Medication Sig     losartan (COZAAR) 50 MG tablet Take 1 tablet by mouth  daily     atorvastatin (LIPITOR) 40 MG tablet Take 1 tablet by mouth  daily     metoprolol (LOPRESSOR) 25 MG tablet Take 1 tablet by mouth two  times daily     hydrochlorothiazide (HYDRODIURIL) 25 MG tablet Take 1 tablet by mouth  daily     buPROPion (WELLBUTRIN XL) 300 MG 24 hr tablet Take 1 tablet by mouth  every morning     nitroglycerin (NITROSTAT) 0.4 MG sublingual tablet Place 1 tablet (0.4 mg) under the tongue every 5 minutes as needed for chest pain (may repeat x2)     prasugrel (EFFIENT) 10 MG TABS Take 1 tablet (10 mg) by mouth daily     aspirin 81 MG tablet Take 1 tablet by mouth daily.     No current facility-administered medications for this visit.        Current Outpatient Prescriptions on File Prior to Visit:  losartan (COZAAR) 50 MG tablet Take 1 tablet by mouth  daily   atorvastatin (LIPITOR) 40 MG tablet Take 1 tablet by mouth  daily   metoprolol (LOPRESSOR) 25 MG tablet Take 1 tablet by mouth two  times daily   hydrochlorothiazide (HYDRODIURIL) 25 MG tablet Take 1 tablet by mouth  daily   buPROPion (WELLBUTRIN XL) 300 MG 24 hr tablet Take 1 tablet by mouth  every morning   nitroglycerin (NITROSTAT) 0.4 MG sublingual tablet Place 1 tablet (0.4 mg) under the tongue every 5 minutes as needed for chest pain (may repeat x2)   prasugrel (EFFIENT) 10 MG TABS Take 1 tablet (10 mg) by mouth daily   aspirin 81 MG tablet Take 1 tablet by mouth daily.     No current facility-administered medications on file prior to visit.   Past Medical History:   Diagnosis Date     Alcohol abuse     Quit over 25 yrs ago     Arthritis      Drug abuse     Quit over 25 yrs ago     Headaches      Hypertension      Past Surgical History:   Procedure  Laterality Date     APPENDECTOMY       ORTHOPEDIC SURGERY Right     Scar over dorsum aspect of right wrist.      ORTHOPEDIC SURGERY Right     Knee     ORTHOPEDIC SURGERY Bilateral     CTS release     SURGICAL HISTORY OF -       Umbilical Hernia     SURGICAL HISTORY OF -   2013    Coronary Stent     PERSONAL & SOCIAL HISTORY Reviewed and documented in [facility]    GENERAL EXAMINATION: /67 (BP Location: Left arm, Patient Position: Chair, Cuff Size: Adult Regular)  Pulse 72  Wt 95.7 kg (211 lb)  BMI 30.71 kg/m2    IMPRESSION:   Encounter Diagnoses   Name Primary?     Idiopathic peripheral neuropathy Yes     Restless legs syndrome (RLS)      Low vitamin B12 level      COMMENTS: Hopefully increasing Gabapentin and is adding Pramipexole schedule will help for his both peripheral neuropathy and restless leg syndrome. We need to get him symptomatic relief to him first.   Advised him to stop taking iron tablets along with vitamin C because his ferritin level is in perfect range now.    PLANS:   Patient Instructions   AFTER VISIT SUMMARY (AVS)  Signed Prescriptions:                        Disp   Refills    gabapentin (NEURONTIN) 400 MG capsule      270 ca*3        Sig: Take 1 capsule (400 mg) by mouth 3 times daily  Authorizing Provider: EDUARDO TIM    pramipexole (MIRAPEX) 0.125 MG tablet      270 ta*0        Sig: Week 1 and 2: One tablet one hour before going to           bed. We 3 and 4: One tablet 2 times a day. Week 5           and afterwards: 1-3 times a day  Authorizing Provider: EDUARDO TIM    cyabnocobalamin (VITAMIN B-12) 2500 MCG wakler*90 tab*1        Sig: Place 2,500 mcg under the tongue daily  Authorizing Provider: EDUARDO TIM    Feet care    Diagnostic possibilities reviewed     Preventive Neurology: Encouraged to keep physically and mentally active with particular emphasis on daily stretching exercises, walking, and healthy eating.    To call us for follow-up appointment in next 6 week(s) or  earlier if needed.    Thanks to Jesse Edgar PA-C for allowing me to participate in Mr. Harvey's care. Please feel free to call me with any questions or concerns.     *Chart documentation was completed in part with Dragon voice-recognition software. Even though reviewed, some grammatical, spelling, and word errors may remain.     Lenard Valerio MD, [facility]  Neurologist    Cc: Jesse Edgar PA-C

## 2017-10-24 NOTE — NURSING NOTE
"Chief Complaint   Patient presents with     RECHECK     Bilateral feet neuropathy       Initial /67 (BP Location: Left arm, Patient Position: Chair, Cuff Size: Adult Regular)  Pulse 72  Wt 95.7 kg (211 lb)  BMI 30.71 kg/m2 Estimated body mass index is 30.71 kg/(m^2) as calculated from the following:    Height as of 10/10/17: 1.765 m (5' 9.5\").    Weight as of this encounter: 95.7 kg (211 lb).  Medication Reconciliation: complete   Ana Oakes MA      "

## 2017-10-25 NOTE — PATIENT INSTRUCTIONS
AFTER VISIT SUMMARY (AVS)  Signed Prescriptions:                        Disp   Refills    gabapentin (NEURONTIN) 400 MG capsule      270 ca*3        Sig: Take 1 capsule (400 mg) by mouth 3 times daily  Authorizing Provider: EDUARDO TIM    pramipexole (MIRAPEX) 0.125 MG tablet      270 ta*0        Sig: Week 1 and 2: One tablet one hour before going to           bed. We 3 and 4: One tablet 2 times a day. Week 5           and afterwards: 1-3 times a day  Authorizing Provider: EDUARDO TIM    cyabnocobalamin (VITAMIN B-12) 2500 MCG walker*90 tab*1        Sig: Place 2,500 mcg under the tongue daily  Authorizing Provider: EDUARDO TIM    Feet care    Diagnostic possibilities reviewed     Preventive Neurology: Encouraged to keep physically and mentally active with particular emphasis on daily stretching exercises, walking, and healthy eating.    To call us for follow-up appointment in next 6 week(s) or earlier if needed.

## 2017-11-03 ENCOUNTER — TRANSFERRED RECORDS (OUTPATIENT)
Dept: HEALTH INFORMATION MANAGEMENT | Facility: CLINIC | Age: 72
End: 2017-11-03

## 2017-11-15 ENCOUNTER — DOCUMENTATION ONLY (OUTPATIENT)
Dept: VASCULAR SURGERY | Facility: CLINIC | Age: 72
End: 2017-11-15

## 2017-11-15 DIAGNOSIS — Z87.891 FORMER TOBACCO USE: Primary | ICD-10-CM

## 2017-11-15 DIAGNOSIS — Z13.6 ENCOUNTER FOR ABDOMINAL AORTIC ANEURYSM (AAA) SCREENING: Primary | ICD-10-CM

## 2018-04-09 DIAGNOSIS — F32.0 MILD MAJOR DEPRESSION (H): ICD-10-CM

## 2018-04-09 DIAGNOSIS — I10 HYPERTENSION GOAL BP (BLOOD PRESSURE) < 140/90: ICD-10-CM

## 2018-04-09 RX ORDER — BUPROPION HYDROCHLORIDE 300 MG/1
TABLET ORAL
Qty: 30 TABLET | Refills: 0 | Status: SHIPPED | OUTPATIENT
Start: 2018-04-09 | End: 2018-04-24

## 2018-04-09 RX ORDER — HYDROCHLOROTHIAZIDE 25 MG/1
TABLET ORAL
Qty: 30 TABLET | Refills: 0 | Status: SHIPPED | OUTPATIENT
Start: 2018-04-09 | End: 2018-04-24

## 2018-04-09 RX ORDER — LOSARTAN POTASSIUM 50 MG/1
TABLET ORAL
Qty: 30 TABLET | Refills: 0 | Status: SHIPPED | OUTPATIENT
Start: 2018-04-09 | End: 2018-04-24

## 2018-04-09 NOTE — LETTER
April 10, 2018    Sylvie Harvey  [address]  [place] [address]    Dear Sylvie,       We recently received a refill request for buPROPion (WELLBUTRIN XL) 300 MG 24 hr tablet, losartan (COZAAR) 50 MG tablet, hydrochlorothiazide (HYDRODIURIL) 25 MG tablet. We have refilled this for a one time 30 day supply only because you are due for a:    Blood Pressure  / Depression office visit and fasting lab appointment      Please schedule this lab appointment 4-5 days prior to the office visit.     Please call at your earliest convenience so that there will not be a delay with your future refills.          Thank you,   Your [facility] Team/ks  [phone]

## 2018-04-17 ENCOUNTER — DOCUMENTATION ONLY (OUTPATIENT)
Dept: LAB | Facility: CLINIC | Age: 73
End: 2018-04-17

## 2018-04-17 DIAGNOSIS — I10 HYPERTENSION GOAL BP (BLOOD PRESSURE) < 140/90: Primary | ICD-10-CM

## 2018-04-18 NOTE — PROGRESS NOTES
Patient has an up coming lab appointment on 4.20.2018. Per Health Maintenance patient is due for basic chem panel and I have pended the order. Please review, sign, and place any additional future orders that may be needed.     Thank you,   Libby Brady MLT ([facility])

## 2018-04-18 NOTE — PROGRESS NOTES
Patient scheduled for fasting labs, please place any additional labs that may be needed sign then close encounter.    Also patient doesn't have Hypertension on PL but is on BP med for dx of hypertension, please add this.    Amanda Forrest,

## 2018-04-20 DIAGNOSIS — I10 HYPERTENSION GOAL BP (BLOOD PRESSURE) < 140/90: ICD-10-CM

## 2018-04-20 LAB
ANION GAP SERPL CALCULATED.3IONS-SCNC: 10 MMOL/L (ref 3–14)
BUN SERPL-MCNC: 36 MG/DL (ref 7–30)
CALCIUM SERPL-MCNC: 8.9 MG/DL (ref 8.5–10.1)
CHLORIDE SERPL-SCNC: 109 MMOL/L (ref 94–109)
CO2 SERPL-SCNC: 23 MMOL/L (ref 20–32)
CREAT SERPL-MCNC: 1.45 MG/DL (ref 0.66–1.25)
GFR SERPL CREATININE-BSD FRML MDRD: 48 ML/MIN/1.7M2
GLUCOSE SERPL-MCNC: 103 MG/DL (ref 70–99)
POTASSIUM SERPL-SCNC: 4.4 MMOL/L (ref 3.4–5.3)
SODIUM SERPL-SCNC: 142 MMOL/L (ref 133–144)

## 2018-04-20 PROCEDURE — 36415 COLL VENOUS BLD VENIPUNCTURE: CPT | Performed by: PHYSICIAN ASSISTANT

## 2018-04-20 PROCEDURE — 80048 BASIC METABOLIC PNL TOTAL CA: CPT | Performed by: PHYSICIAN ASSISTANT

## 2018-04-20 NOTE — PROGRESS NOTES
SUBJECTIVE:                                                    Sylvie Harvey is a 73 year old male who presents to clinic today for the following health issues:    Hyperlipidemia Follow-Up      Rate your low fat/cholesterol diet?: not monitoring fat    Taking statin?  Yes, no muscle aches from statin    Other lipid medications/supplements?:  none    Vascular Disease Follow-up:  Coronary Artery Disease (CAD)      Chest pain or pressure, left side neck or arm pain: No    Shortness of breath/increased sweats/nausea with exertion: Yes at times    Pain in calves walking 1-2 blocks: No    Worsened or new symptoms since last visit: No    Nitroglycerin use: no    Daily aspirin use: Yes  See's cardiology yearly .    Depression Followup    Status since last visit: Stable     See PHQ-9 for current symptoms.  Other associated symptoms:     Complicating factors:   Significant life event:  No   Current substance abuse:  None  Anxiety or Panic symptoms:  No    The patient is a 73-yo male who presents for a medication check. He has a hx of coronary artery disease. He sees cardiology annually - every November. He denies chest pain, palpitation, or use of his nitroglycerin. He takes asa, ticagrelor, atorvastatin, and metoprolol and tolerates them well.    His blood pressure is stable with HCTZ and losartan. His BMP on 4/20/18 reveals slightly decreased kidney function since 10/2017. He reports drinking plenty of water during the day.     His fasting blood glucose was 104 on 4/20/18. He denies regular exercise or a well-balanced diet.    The patient would like to stop taking gabapentin. He does not think it helps his peripheral neuropathy. He has seen Dr. Valerio in the past, but does not have a new neurologist.      PHQ-9 3/28/2017 10/10/2017 4/24/2018   Total Score 0 0 1   Q9: Suicide Ideation Not at all Not at all Not at all       PHQ-9  English  PHQ-9   Any Language  Suicide Assessment Five-step Evaluation and Treatment  (SAFE-T)    Amount of exercise or physical activity: active at work     Problems taking medications regularly: No    Medication side effects: none    Diet: low carb         Problem list and histories reviewed & adjusted, as indicated.  Additional history: as documented      Patient Active Problem List   Diagnosis     Advanced directives, counseling/discussion     Hyperlipidemia LDL goal <130     Pain in shoulder     Plantar fasciitis     Medial meniscus tear     Coronary artery disease involving native heart with angina pectoris, unspecified vessel or lesion type (H)     Arthritis     Major depressive disorder, recurrent episode, mild (H)     Past Surgical History:   Procedure Laterality Date     APPENDECTOMY       ORTHOPEDIC SURGERY Right     Scar over dorsum aspect of right wrist.      ORTHOPEDIC SURGERY Right     Knee     ORTHOPEDIC SURGERY Bilateral     CTS release     SURGICAL HISTORY OF -       Umbilical Hernia     SURGICAL HISTORY OF -   2013    Coronary Stent       Social History   Substance Use Topics     Smoking status: Former Smoker     Quit date: 4/17/1983     Smokeless tobacco: Never Used     Alcohol use No      Comment: Quit 25 years ago (2013)     History reviewed. No pertinent family history.      Current Outpatient Prescriptions   Medication Sig Dispense Refill     aspirin 81 MG tablet Take 1 tablet by mouth daily.       atorvastatin (LIPITOR) 40 MG tablet Take 1 tablet by mouth  daily 90 tablet 3     buPROPion (WELLBUTRIN XL) 300 MG 24 hr tablet TAKE 1 TABLET BY MOUTH  EVERY MORNING 90 tablet 1     gabapentin (NEURONTIN) 400 MG capsule Take 1 capsule (400 mg) by mouth 3 times daily 270 capsule 1     hydrochlorothiazide (HYDRODIURIL) 25 MG tablet TAKE 1 TABLET BY MOUTH  DAILY 90 tablet 1     losartan (COZAAR) 50 MG tablet TAKE 1 TABLET BY MOUTH  DAILY 90 tablet 1     metoprolol (LOPRESSOR) 25 MG tablet Take 1 tablet by mouth two  times daily 180 tablet 1     ticagrelor (BRILINTA) 60 MG tablet Take  "60 mg by mouth 2 times daily       nitroglycerin (NITROSTAT) 0.4 MG sublingual tablet Place 1 tablet (0.4 mg) under the tongue every 5 minutes as needed for chest pain (may repeat x2) (Patient not taking: Reported on 4/24/2018) 10 tablet 3     [DISCONTINUED] buPROPion (WELLBUTRIN XL) 300 MG 24 hr tablet TAKE 1 TABLET BY MOUTH  EVERY MORNING 30 tablet 0     [DISCONTINUED] hydrochlorothiazide (HYDRODIURIL) 25 MG tablet TAKE 1 TABLET BY MOUTH  DAILY 30 tablet 0     [DISCONTINUED] losartan (COZAAR) 50 MG tablet TAKE 1 TABLET BY MOUTH  DAILY 30 tablet 0     Recent Labs   Lab Test  04/20/18   0807  10/05/17   0920  10/18/16   1107  03/31/16   1021   04/20/15   0923  10/08/13   0920  04/25/13   1004   LDL   --   47  75   --    --   82  57  136*   HDL   --   51  32*   --    --   34*  32*  35*   TRIG   --   78  278*   --    --   240*  262*  203*   ALT   --    --    --   16   --    --   29  30   CR  1.45*  1.39*  0.99  1.09   < >  1.09  0.98  1.01   GFRESTIMATED  48*  50*  74  67   < >  67  76  73   GFRESTBLACK  58*  61  90  81   < >  81  >90  89   POTASSIUM  4.4  4.4  3.8  3.9   < >  3.9  3.9  3.9   TSH   --    --    --    --    --    --    --   2.57    < > = values in this interval not displayed.      BP Readings from Last 3 Encounters:   04/24/18 118/70   10/24/17 137/67   10/10/17 120/79    Wt Readings from Last 3 Encounters:   04/24/18 242 lb (109.8 kg)   10/24/17 211 lb (95.7 kg)   10/18/17 210 lb (95.3 kg)                    ROS:  Constitutional, HEENT, cardiovascular, pulmonary, gi and gu systems are negative, except as otherwise noted.    OBJECTIVE:     /70  Pulse 70  Temp 97.8  F (36.6  C) (Oral)  Resp 18  Ht 5' 9.5\" (1.765 m)  Wt 242 lb (109.8 kg)  SpO2 96%  BMI 35.22 kg/m2  Body mass index is 35.22 kg/(m^2).  GENERAL: healthy, alert and no distress  EYES: Eyes grossly normal to inspection, PERRL and conjunctivae and sclerae normal  RESP: lungs clear to auscultation - no rales, rhonchi or wheezes  CV: " regular rate and rhythm, normal S1 S2, no S3 or S4, no murmur, click or rub, no peripheral edema and peripheral pulses strong  ABDOMEN: soft, nontender, no hepatosplenomegaly, no masses and bowel sounds normal  MS: no gross musculoskeletal defects noted, no edema  SKIN: no suspicious lesions or rashes  NEURO: Normal strength and tone, mentation intact and speech normal  PSYCH: mentation appears normal, affect normal/bright    Diagnostic Test Results:  No results found for this or any previous visit (from the past 24 hour(s)).    ASSESSMENT/PLAN:       ICD-10-CM    1. Major depressive disorder, recurrent episode, mild (H) F33.0    2. Hyperlipidemia LDL goal <130 E78.5    3. Coronary artery disease involving native heart with angina pectoris, unspecified vessel or lesion type (H) I25.119    4. Screen for colon cancer Z12.11 GASTROENTEROLOGY ADULT REF PROCEDURE ONLY   5. Mild major depression (H) F32.0 buPROPion (WELLBUTRIN XL) 300 MG 24 hr tablet   6. Hypertension goal BP (blood pressure) < 140/90 I10 hydrochlorothiazide (HYDRODIURIL) 25 MG tablet     losartan (COZAAR) 50 MG tablet   7. Idiopathic peripheral neuropathy G60.9 NEUROLOGY ADULT REFERRAL     1. Stable with bupropion.   2. Continue atorvastatin. Recheck lipids in 6 months.   3. Stable with current medications. Follow-up with cardiology in November 2018.  4. We discussed colon cancer screening. The patient plans to update this.   5. Stable with bupropion.   6. Stable with current medications. Recheck kidney function in 6 months. If kidney function continues to be decreased or worsens, we will need to adjust his blood pressure medications.   7. I provided a neurology referral. He will continue gabapentin until he consults with a neurologist.     The patient's questions were answered. He understood and agreed to this plan.     Follow-up in 6 months or sooner if needed    I, Laurel Smith, PA student from [employer], acted as a scribe.     The student  acted as a scribe and the encounter documented above was completely performed by myself and the documentation reflects the work I have performed today.     Jesse Edgar PA-C  [facility]

## 2018-04-24 ENCOUNTER — OFFICE VISIT (OUTPATIENT)
Dept: FAMILY MEDICINE | Facility: CLINIC | Age: 73
End: 2018-04-24
Payer: COMMERCIAL

## 2018-04-24 VITALS
HEART RATE: 70 BPM | DIASTOLIC BLOOD PRESSURE: 70 MMHG | WEIGHT: 242 LBS | OXYGEN SATURATION: 96 % | BODY MASS INDEX: 34.65 KG/M2 | HEIGHT: 70 IN | RESPIRATION RATE: 18 BRPM | SYSTOLIC BLOOD PRESSURE: 118 MMHG | TEMPERATURE: 97.8 F

## 2018-04-24 DIAGNOSIS — F32.0 MILD MAJOR DEPRESSION (H): ICD-10-CM

## 2018-04-24 DIAGNOSIS — Z12.11 SCREEN FOR COLON CANCER: ICD-10-CM

## 2018-04-24 DIAGNOSIS — I25.119 CORONARY ARTERY DISEASE INVOLVING NATIVE HEART WITH ANGINA PECTORIS, UNSPECIFIED VESSEL OR LESION TYPE (H): ICD-10-CM

## 2018-04-24 DIAGNOSIS — F33.0 MAJOR DEPRESSIVE DISORDER, RECURRENT EPISODE, MILD (H): Primary | ICD-10-CM

## 2018-04-24 DIAGNOSIS — E78.5 HYPERLIPIDEMIA LDL GOAL <130: ICD-10-CM

## 2018-04-24 DIAGNOSIS — G60.9 IDIOPATHIC PERIPHERAL NEUROPATHY: ICD-10-CM

## 2018-04-24 DIAGNOSIS — I10 HYPERTENSION GOAL BP (BLOOD PRESSURE) < 140/90: ICD-10-CM

## 2018-04-24 PROCEDURE — 99214 OFFICE O/P EST MOD 30 MIN: CPT | Performed by: PHYSICIAN ASSISTANT

## 2018-04-24 RX ORDER — HYDROCHLOROTHIAZIDE 25 MG/1
TABLET ORAL
Qty: 90 TABLET | Refills: 1 | Status: SHIPPED | OUTPATIENT
Start: 2018-04-24 | End: 2018-09-27

## 2018-04-24 RX ORDER — LOSARTAN POTASSIUM 50 MG/1
TABLET ORAL
Qty: 90 TABLET | Refills: 1 | Status: SHIPPED | OUTPATIENT
Start: 2018-04-24 | End: 2018-09-27

## 2018-04-24 RX ORDER — BUPROPION HYDROCHLORIDE 300 MG/1
TABLET ORAL
Qty: 90 TABLET | Refills: 1 | Status: SHIPPED | OUTPATIENT
Start: 2018-04-24 | End: 2018-09-27

## 2018-04-24 NOTE — MR AVS SNAPSHOT
After Visit Summary   4/24/2018    Sylvie Harvey    MRN: [id number]           Patient Information     Date Of Birth          1945        Visit Information        Provider Department      4/24/2018 11:00 AM Jesse Edgar PA-C [facility]        Today's Diagnoses     Major depressive disorder, recurrent episode, mild (H)    -  1    Hyperlipidemia LDL goal <130        Coronary artery disease involving native heart with angina pectoris, unspecified vessel or lesion type (H)        Screen for colon cancer        Mild major depression (H)        Hypertension goal BP (blood pressure) < 140/90        Idiopathic peripheral neuropathy           Follow-ups after your visit        Additional Services     GASTROENTEROLOGY ADULT REF PROCEDURE ONLY       Last Lab Result: Creatinine (mg/dL)       Date                     Value                 04/20/2018               1.45 (H)         ----------  Body mass index is 35.22 kg/(m^2).      Patient will be contacted to schedule procedure.     Please be aware that coverage of these services is subject to the terms and limitations of your health insurance plan.  Call member services at your health plan with any benefit or coverage questions.  Any procedures must be performed at a [facility] facility OR coordinated by your clinic's referral office.    Please bring the following with you to your appointment:    (1) Any X-Rays, CTs or MRIs which have been performed.  Contact the facility where they were done to arrange for  prior to your scheduled appointment.    (2) List of current medications   (3) This referral request   (4) Any documents/labs given to you for this referral            NEUROLOGY ADULT REFERRAL       Your provider has referred you for the following:   Consult at [facility]: [facility] [place] [phone]   [email or web address]    Please be aware that  coverage of these services is subject to the terms and limitations of your health insurance plan.  Call member services at your health plan with any benefit or coverage questions.      Please bring the following with you to your appointment:    (1) Any X-Rays, CTs or MRIs which have been performed.  Contact the facility where they were done to arrange for  prior to your scheduled appointment.    (2) List of current medications  (3) This referral request   (4) Any documents/labs given to you for this referral                  Who to contact     If you have questions or need follow up information about today's clinic visit or your schedule please contact [facility] directly at [phone].  Normal or non-critical lab and imaging results will be communicated to you by MyChart, letter or phone within 4 business days after the clinic has received the results. If you do not hear from us within 7 days, please contact the clinic through [employer] or phone. If you have a critical or abnormal lab result, we will notify you by phone as soon as possible.  Submit refill requests through [employer] or call your pharmacy and they will forward the refill request to us. Please allow 3 business days for your refill to be completed.          Additional Information About Your Visit        [employer] Information     [employer] gives you secure access to your electronic health record. If you see a primary care provider, you can also send messages to your care team and make appointments. If you have questions, please call your primary care clinic.  If you do not have a primary care provider, please call [phone] and they will assist you.        Care EveryWhere ID     This is your Care EveryWhere ID. This could be used by other organizations to access your [facility] medical records  [id number]        Your Vitals Were     Pulse Temperature Respirations Height Pulse Oximetry BMI (Body Mass Index)    70 97.8  F (36.6  C)  "(Oral) 18 5' 9.5\" (1.765 m) 96% 35.22 kg/m2       Blood Pressure from Last 3 Encounters:   04/24/18 118/70   10/24/17 137/67   10/10/17 120/79    Weight from Last 3 Encounters:   04/24/18 242 lb (109.8 kg)   10/24/17 211 lb (95.7 kg)   10/18/17 210 lb (95.3 kg)              We Performed the Following     GASTROENTEROLOGY ADULT REF PROCEDURE ONLY     NEUROLOGY ADULT REFERRAL          Today's Medication Changes          These changes are accurate as of 4/24/18 11:33 AM.  If you have any questions, ask your nurse or doctor.               These medicines have changed or have updated prescriptions.        Dose/Directions    buPROPion 300 MG 24 hr tablet   Commonly known as:  WELLBUTRIN XL   This may have changed:  See the new instructions.   Used for:  Mild major depression (H)   Changed by:  Jesse Edgar PA-C        TAKE 1 TABLET BY MOUTH  EVERY MORNING   Quantity:  90 tablet   Refills:  1       hydrochlorothiazide 25 MG tablet   Commonly known as:  HYDRODIURIL   This may have changed:  See the new instructions.   Used for:  Hypertension goal BP (blood pressure) < 140/90   Changed by:  Jesse Edgar PA-C        TAKE 1 TABLET BY MOUTH  DAILY   Quantity:  90 tablet   Refills:  1       losartan 50 MG tablet   Commonly known as:  COZAAR   This may have changed:  See the new instructions.   Used for:  Hypertension goal BP (blood pressure) < 140/90   Changed by:  Jesse Edgar PA-C        TAKE 1 TABLET BY MOUTH  DAILY   Quantity:  90 tablet   Refills:  1            Where to get your medicines      These medications were sent to [facility] - [address], [facility] [address]     Phone:  [phone]     buPROPion 300 MG 24 hr tablet    hydrochlorothiazide 25 MG tablet    losartan 50 MG tablet                Primary Care Provider Office Phone # Fax #    Jesse Edgar PA-C [phone] [id number] " [facility] [address]        Equal Access to Services     [facility] ALENA : Hadii aad ku hadyennysolitario Sohadleyali, wakerida luqadaha, qaybta nandomasonmaribel costello. So [facility] [phone].    ATENCIÓN: Si habla español, tiene a walker disposición servicios gratuitos de asistencia lingüística. Johnsoname al [phone].    We comply with applicable federal civil rights laws and [place] laws. We do not discriminate on the basis of race, color, national origin, age, disability, sex, sexual orientation, or gender identity.            Thank you!     Thank you for choosing [facility]  for your care. Our goal is always to provide you with excellent care. Hearing back from our patients is one way we can continue to improve our services. Please take a few minutes to complete the written survey that you may receive in the mail after your visit with us. Thank you!             Your Updated Medication List - Protect others around you: Learn how to safely use, store and throw away your medicines at www.disposemymeds.org.          This list is accurate as of 4/24/18 11:33 AM.  Always use your most recent med list.                   Brand Name Dispense Instructions for use Diagnosis    aspirin 81 MG tablet      Take 1 tablet by mouth daily.        atorvastatin 40 MG tablet    LIPITOR    90 tablet    Take 1 tablet by mouth  daily    Coronary artery disease involving native heart with angina pectoris, unspecified vessel or lesion type (H), Hyperlipidemia LDL goal <130       buPROPion 300 MG 24 hr tablet    WELLBUTRIN XL    90 tablet    TAKE 1 TABLET BY MOUTH  EVERY MORNING    Mild major depression (H)       gabapentin 400 MG capsule    NEURONTIN    270 capsule    Take 1 capsule (400 mg) by mouth 3 times daily    Idiopathic peripheral neuropathy       hydrochlorothiazide 25 MG tablet    HYDRODIURIL    90 tablet    TAKE 1 TABLET BY MOUTH  DAILY    Hypertension goal BP (blood pressure) < 140/90        losartan 50 MG tablet    COZAAR    90 tablet    TAKE 1 TABLET BY MOUTH  DAILY    Hypertension goal BP (blood pressure) < 140/90       metoprolol tartrate 25 MG tablet    LOPRESSOR    180 tablet    Take 1 tablet by mouth two  times daily    Hypertension goal BP (blood pressure) < 140/90       nitroGLYcerin 0.4 MG sublingual tablet    NITROSTAT    10 tablet    Place 1 tablet (0.4 mg) under the tongue every 5 minutes as needed for chest pain (may repeat x2)    CAD (coronary artery disease)       ticagrelor 60 MG tablet    BRILINTA     Take 60 mg by mouth 2 times daily

## 2018-04-24 NOTE — NURSING NOTE
"Chief Complaint   Patient presents with     Depression     Lipids     Health Maintenance     ADP, PHQ9, Pneumonia       Initial /70  Pulse 70  Temp 97.8  F (36.6  C) (Oral)  Resp 18  Ht 5' 9.5\" (1.765 m)  Wt 242 lb (109.8 kg)  SpO2 96%  BMI 35.22 kg/m2 Estimated body mass index is 35.22 kg/(m^2) as calculated from the following:    Height as of this encounter: 5' 9.5\" (1.765 m).    Weight as of this encounter: 242 lb (109.8 kg).  Medication Reconciliation: complete  Anastacia Gallagher CMA    "

## 2018-04-25 ASSESSMENT — PATIENT HEALTH QUESTIONNAIRE - PHQ9: SUM OF ALL RESPONSES TO PHQ QUESTIONS 1-9: 1

## 2018-06-26 DIAGNOSIS — I10 HYPERTENSION GOAL BP (BLOOD PRESSURE) < 140/90: ICD-10-CM

## 2018-06-27 RX ORDER — METOPROLOL TARTRATE 25 MG/1
TABLET, FILM COATED ORAL
Qty: 180 TABLET | Refills: 2 | Status: SHIPPED | OUTPATIENT
Start: 2018-06-27 | End: 2018-10-23

## 2018-08-06 ENCOUNTER — TELEPHONE (OUTPATIENT)
Dept: FAMILY MEDICINE | Facility: CLINIC | Age: 73
End: 2018-08-06

## 2018-08-06 NOTE — TELEPHONE ENCOUNTER
Panel Management Review      Patient has the following on his problem list: None      Composite cancer screening  Chart review shows that this patient is due/due soon for the following Fecal Colorectal (FIT)  Summary:    Patient is due/failing the following:   FIT    Action needed:   Patient needs referral/order: fit test    Type of outreach:    Sent letter.    Questions for provider review:    None                                                                                                                                    SUSHANT Mandujano       Chart routed to none .

## 2018-08-06 NOTE — LETTER
Sylvie Harvey  [address]   [place] [address]          August 6, 2018          Dear Sylvie Harvey      Our records indicate that you have not scheduled for a(n)FIT test which is recommended by your health care team. Monitoring and managing your preventative and chronic health conditions are very important to us.       If you have received your health care elsewhere, please provide us with that information so it can be documented in your chart.    Please call [phone] or message us through your [employer]okat account to schedule an appointment or provide information for your chart.     We look forward to seeing you and working with you on your health care needs.     Sincerely,   Jesse Edgar PA-C/CINTHIA CMA          *If you have already scheduled an appointment, please disregard this reminder

## 2018-09-19 DIAGNOSIS — G60.9 IDIOPATHIC PERIPHERAL NEUROPATHY: ICD-10-CM

## 2018-09-19 RX ORDER — GABAPENTIN 400 MG/1
400 CAPSULE ORAL 3 TIMES DAILY
Qty: 270 CAPSULE | Refills: 1 | Status: SHIPPED | OUTPATIENT
Start: 2018-09-19 | End: 2019-06-26

## 2018-09-19 NOTE — TELEPHONE ENCOUNTER
Last sent by Dr Valerio, who is no longer with [facility].    Will forward to PCP.    Requested Prescriptions   Pending Prescriptions Disp Refills     gabapentin (NEURONTIN) 400 MG capsule        Last Written Prescription Date:  04/09/18  Last Fill Quantity: 270,   # refills: 1  Last Office Visit: 04/24/18-[facility]  Future Office visit:       Routing refill request to provider for review/approval because:  Drug not on the G, P or [facility] refill protocol or controlled substance 270 capsule 1     Sig: Take 1 capsule (400 mg) by mouth 3 times daily    There is no refill protocol information for this order

## 2018-10-09 ENCOUNTER — DOCUMENTATION ONLY (OUTPATIENT)
Dept: LAB | Facility: CLINIC | Age: 73
End: 2018-10-09

## 2018-10-09 DIAGNOSIS — I25.119 CORONARY ARTERY DISEASE INVOLVING NATIVE HEART WITH ANGINA PECTORIS, UNSPECIFIED VESSEL OR LESION TYPE (H): ICD-10-CM

## 2018-10-09 DIAGNOSIS — E78.5 HYPERLIPIDEMIA LDL GOAL <130: Primary | ICD-10-CM

## 2018-10-09 NOTE — PROGRESS NOTES
Please review lab orders sign and close encounter. Krysten Xie TC    Pvl 10/10/18    BP ck med refill 10/23/18

## 2018-10-12 ENCOUNTER — DOCUMENTATION ONLY (OUTPATIENT)
Dept: LAB | Facility: CLINIC | Age: 73
End: 2018-10-12

## 2018-10-12 DIAGNOSIS — Z12.5 SCREENING PSA (PROSTATE SPECIFIC ANTIGEN): ICD-10-CM

## 2018-10-12 DIAGNOSIS — Z12.5 SCREENING PSA (PROSTATE SPECIFIC ANTIGEN): Primary | ICD-10-CM

## 2018-10-12 DIAGNOSIS — E78.5 HYPERLIPIDEMIA LDL GOAL <130: ICD-10-CM

## 2018-10-12 DIAGNOSIS — I25.119 CORONARY ARTERY DISEASE INVOLVING NATIVE HEART WITH ANGINA PECTORIS, UNSPECIFIED VESSEL OR LESION TYPE (H): ICD-10-CM

## 2018-10-12 LAB
ANION GAP SERPL CALCULATED.3IONS-SCNC: 8 MMOL/L (ref 3–14)
BUN SERPL-MCNC: 39 MG/DL (ref 7–30)
CALCIUM SERPL-MCNC: 8.9 MG/DL (ref 8.5–10.1)
CHLORIDE SERPL-SCNC: 106 MMOL/L (ref 94–109)
CHOLEST SERPL-MCNC: 125 MG/DL
CO2 SERPL-SCNC: 26 MMOL/L (ref 20–32)
CREAT SERPL-MCNC: 1.6 MG/DL (ref 0.66–1.25)
GFR SERPL CREATININE-BSD FRML MDRD: 43 ML/MIN/1.7M2
GLUCOSE SERPL-MCNC: 99 MG/DL (ref 70–99)
HDLC SERPL-MCNC: 37 MG/DL
LDLC SERPL CALC-MCNC: 60 MG/DL
NONHDLC SERPL-MCNC: 88 MG/DL
POTASSIUM SERPL-SCNC: 4.4 MMOL/L (ref 3.4–5.3)
PSA SERPL-ACNC: 1.87 UG/L (ref 0–4)
SODIUM SERPL-SCNC: 140 MMOL/L (ref 133–144)
TRIGL SERPL-MCNC: 141 MG/DL

## 2018-10-12 PROCEDURE — 80061 LIPID PANEL: CPT | Performed by: PHYSICIAN ASSISTANT

## 2018-10-12 PROCEDURE — 80048 BASIC METABOLIC PNL TOTAL CA: CPT | Performed by: PHYSICIAN ASSISTANT

## 2018-10-12 PROCEDURE — 36415 COLL VENOUS BLD VENIPUNCTURE: CPT | Performed by: PHYSICIAN ASSISTANT

## 2018-10-12 PROCEDURE — G0103 PSA SCREENING: HCPCS | Performed by: PHYSICIAN ASSISTANT

## 2018-10-12 NOTE — PROGRESS NOTES
Patient had his pre-visit blood work drawn this morning.  He is requesting a PSA.  I drew an extra tube for it.  Will Jesse order?  Please place a FUTURE order if so and lab will watch for it.     Thank you.  Fanny Kress Jorge

## 2018-10-23 ENCOUNTER — OFFICE VISIT (OUTPATIENT)
Dept: FAMILY MEDICINE | Facility: CLINIC | Age: 73
End: 2018-10-23
Payer: COMMERCIAL

## 2018-10-23 VITALS
RESPIRATION RATE: 16 BRPM | HEIGHT: 70 IN | BODY MASS INDEX: 36.08 KG/M2 | OXYGEN SATURATION: 96 % | DIASTOLIC BLOOD PRESSURE: 77 MMHG | HEART RATE: 68 BPM | WEIGHT: 252 LBS | SYSTOLIC BLOOD PRESSURE: 128 MMHG | TEMPERATURE: 97.2 F

## 2018-10-23 DIAGNOSIS — F33.0 MAJOR DEPRESSIVE DISORDER, RECURRENT EPISODE, MILD (H): Primary | ICD-10-CM

## 2018-10-23 DIAGNOSIS — E78.5 HYPERLIPIDEMIA LDL GOAL <130: ICD-10-CM

## 2018-10-23 DIAGNOSIS — I25.119 CORONARY ARTERY DISEASE INVOLVING NATIVE HEART WITH ANGINA PECTORIS, UNSPECIFIED VESSEL OR LESION TYPE (H): ICD-10-CM

## 2018-10-23 DIAGNOSIS — N18.30 CKD (CHRONIC KIDNEY DISEASE) STAGE 3, GFR 30-59 ML/MIN (H): ICD-10-CM

## 2018-10-23 DIAGNOSIS — G60.9 IDIOPATHIC PERIPHERAL NEUROPATHY: ICD-10-CM

## 2018-10-23 DIAGNOSIS — I10 HYPERTENSION GOAL BP (BLOOD PRESSURE) < 140/90: ICD-10-CM

## 2018-10-23 DIAGNOSIS — R06.83 SNORES: ICD-10-CM

## 2018-10-23 PROCEDURE — 99214 OFFICE O/P EST MOD 30 MIN: CPT | Performed by: PHYSICIAN ASSISTANT

## 2018-10-23 RX ORDER — METOPROLOL TARTRATE 25 MG/1
TABLET, FILM COATED ORAL
Qty: 180 TABLET | Refills: 1 | Status: SHIPPED | OUTPATIENT
Start: 2018-10-23 | End: 2018-12-18

## 2018-10-23 RX ORDER — LOSARTAN POTASSIUM 50 MG/1
TABLET ORAL
Qty: 90 TABLET | Refills: 1 | Status: SHIPPED | OUTPATIENT
Start: 2018-10-23 | End: 2018-12-18

## 2018-10-23 RX ORDER — ATORVASTATIN CALCIUM 40 MG/1
40 TABLET, FILM COATED ORAL DAILY
Qty: 90 TABLET | Refills: 0 | Status: SHIPPED | OUTPATIENT
Start: 2018-10-23 | End: 2018-12-18

## 2018-10-23 RX ORDER — HYDROCHLOROTHIAZIDE 25 MG/1
TABLET ORAL
Qty: 90 TABLET | Refills: 1 | Status: SHIPPED | OUTPATIENT
Start: 2018-10-23 | End: 2018-12-18

## 2018-10-23 ASSESSMENT — ANXIETY QUESTIONNAIRES
2. NOT BEING ABLE TO STOP OR CONTROL WORRYING: NOT AT ALL
5. BEING SO RESTLESS THAT IT IS HARD TO SIT STILL: NOT AT ALL
7. FEELING AFRAID AS IF SOMETHING AWFUL MIGHT HAPPEN: NOT AT ALL
1. FEELING NERVOUS, ANXIOUS, OR ON EDGE: NOT AT ALL
6. BECOMING EASILY ANNOYED OR IRRITABLE: NOT AT ALL
GAD7 TOTAL SCORE: 0
3. WORRYING TOO MUCH ABOUT DIFFERENT THINGS: NOT AT ALL

## 2018-10-23 ASSESSMENT — PATIENT HEALTH QUESTIONNAIRE - PHQ9: 5. POOR APPETITE OR OVEREATING: NOT AT ALL

## 2018-10-23 NOTE — NURSING NOTE
"Chief Complaint   Patient presents with     Lipids     Depression     Health Maintenance     p9/ dap done        Initial /77  Pulse 68  Temp 97.2  F (36.2  C) (Oral)  Resp 16  Ht 5' 9.5\" (1.765 m)  Wt 252 lb (114.3 kg)  SpO2 96%  BMI 36.68 kg/m2 Estimated body mass index is 36.68 kg/(m^2) as calculated from the following:    Height as of this encounter: 5' 9.5\" (1.765 m).    Weight as of this encounter: 252 lb (114.3 kg).    Sandra Donahue, SUSHANT    "

## 2018-10-23 NOTE — PROGRESS NOTES
SUBJECTIVE:   Sylvie Harvey is a 73 year old male who presents to clinic today for the following health issues:        Medication Followup of Wellbutrin    Taking Medication as prescribed: yes    Side Effects:  None    Medication Helping Symptoms:  yes       Vascular Disease Follow-up:  Coronary Artery Disease (CAD)      Chest pain or pressure, left side neck or arm pain: No    Shortness of breath/increased sweats/nausea with exertion: Yes occ short of breath, increase snoring.     Pain in calves walking 1-2 blocks: No    Worsened or new symptoms since last visit: No    Nitroglycerin use: no    Daily aspirin use: Yes  Also con't peripheral neuropathy. On Neurontin for years with no help. Wants referral to neurology.     Creatine has been increasing. Does take 4 aleve daily. He is unsure why. He thought cardiology told him to take it.     Problem list and histories reviewed & adjusted, as indicated.  Additional history: as documented    Patient Active Problem List   Diagnosis     Advanced directives, counseling/discussion     Hyperlipidemia LDL goal <130     Pain in shoulder     Plantar fasciitis     Medial meniscus tear     Coronary artery disease involving native heart with angina pectoris, unspecified vessel or lesion type (H)     Arthritis     Major depressive disorder, recurrent episode, mild (H)     Idiopathic peripheral neuropathy     CKD (chronic kidney disease) stage 3, GFR 30-59 ml/min (H)     Past Surgical History:   Procedure Laterality Date     APPENDECTOMY       ORTHOPEDIC SURGERY Right     Scar over dorsum aspect of right wrist.      ORTHOPEDIC SURGERY Right     Knee     ORTHOPEDIC SURGERY Bilateral     CTS release     SURGICAL HISTORY OF -       Umbilical Hernia     SURGICAL HISTORY OF -   2013    Coronary Stent       Social History   Substance Use Topics     Smoking status: Former Smoker     Quit date: 4/17/1983     Smokeless tobacco: Never Used     Alcohol use No      Comment: Quit 25 years  ago (2013)     No family history on file.      Current Outpatient Prescriptions   Medication Sig Dispense Refill     aspirin 81 MG tablet Take 1 tablet by mouth daily.       atorvastatin (LIPITOR) 40 MG tablet Take 1 tablet (40 mg) by mouth daily Last refill.  Needs appointment. 90 tablet 0     buPROPion (WELLBUTRIN XL) 300 MG 24 hr tablet TAKE 1 TABLET BY MOUTH  EVERY MORNING 30 tablet 0     gabapentin (NEURONTIN) 400 MG capsule Take 1 capsule (400 mg) by mouth 3 times daily 270 capsule 1     hydrochlorothiazide (HYDRODIURIL) 25 MG tablet TAKE 1 TABLET BY MOUTH  DAILY 90 tablet 1     losartan (COZAAR) 50 MG tablet TAKE 1 TABLET BY MOUTH  DAILY 90 tablet 1     metoprolol tartrate (LOPRESSOR) 25 MG tablet TAKE 1 TABLET BY MOUTH TWO  TIMES DAILY 180 tablet 1     nitroglycerin (NITROSTAT) 0.4 MG sublingual tablet Place 1 tablet (0.4 mg) under the tongue every 5 minutes as needed for chest pain (may repeat x2) 10 tablet 3     ticagrelor (BRILINTA) 60 MG tablet Take 60 mg by mouth 2 times daily       [DISCONTINUED] atorvastatin (LIPITOR) 40 MG tablet Take 1 tablet (40 mg) by mouth daily Last refill.  Needs appointment. 90 tablet 0     [DISCONTINUED] hydrochlorothiazide (HYDRODIURIL) 25 MG tablet TAKE 1 TABLET BY MOUTH  DAILY 30 tablet 0     [DISCONTINUED] losartan (COZAAR) 50 MG tablet TAKE 1 TABLET BY MOUTH  DAILY 30 tablet 0     [DISCONTINUED] metoprolol tartrate (LOPRESSOR) 25 MG tablet TAKE 1 TABLET BY MOUTH TWO  TIMES DAILY 180 tablet 2     Allergies   Allergen Reactions     Sulfa Drugs      BP Readings from Last 3 Encounters:   10/23/18 128/77   04/24/18 118/70   10/24/17 137/67    Wt Readings from Last 3 Encounters:   10/23/18 252 lb (114.3 kg)   04/24/18 242 lb (109.8 kg)   10/24/17 211 lb (95.7 kg)                  Labs reviewed in EPIC    Reviewed and updated as needed this visit by clinical staff  Tobacco  Allergies  Meds  Problems       Reviewed and updated as needed this visit by Provider      "    ROS:  Constitutional, HEENT, cardiovascular, pulmonary, gi and gu systems are negative, except as otherwise noted.    OBJECTIVE:     /77  Pulse 68  Temp 97.2  F (36.2  C) (Oral)  Resp 16  Ht 5' 9.5\" (1.765 m)  Wt 252 lb (114.3 kg)  SpO2 96%  BMI 36.68 kg/m2  Body mass index is 36.68 kg/(m^2).  GENERAL: healthy, alert and no distress  NECK: no adenopathy, no asymmetry, masses, or scars and thyroid normal to palpation  RESP: lungs clear to auscultation - no rales, rhonchi or wheezes  CV: regular rate and rhythm, normal S1 S2, no S3 or S4, no murmur, click or rub, no peripheral edema and peripheral pulses strong  ABDOMEN: soft, nontender, no hepatosplenomegaly, no masses and bowel sounds normal  MS: no gross musculoskeletal defects noted, no edema    Diagnostic Test Results:  Results for orders placed or performed in visit on 10/12/18   Lipid panel reflex to direct LDL Fasting   Result Value Ref Range    Cholesterol 125 <200 mg/dL    Triglycerides 141 <150 mg/dL    HDL Cholesterol 37 (L) >39 mg/dL    LDL Cholesterol Calculated 60 <100 mg/dL    Non HDL Cholesterol 88 <130 mg/dL   **Basic metabolic panel FUTURE anytime   Result Value Ref Range    Sodium 140 133 - 144 mmol/L    Potassium 4.4 3.4 - 5.3 mmol/L    Chloride 106 94 - 109 mmol/L    Carbon Dioxide 26 20 - 32 mmol/L    Anion Gap 8 3 - 14 mmol/L    Glucose 99 70 - 99 mg/dL    Urea Nitrogen 39 (H) 7 - 30 mg/dL    Creatinine 1.60 (H) 0.66 - 1.25 mg/dL    GFR Estimate 43 (L) >60 mL/min/1.7m2    GFR Estimate If Black 51 (L) >60 mL/min/1.7m2    Calcium 8.9 8.5 - 10.1 mg/dL   Prostate spec antigen screen   Result Value Ref Range    PSA 1.87 0 - 4 ug/L       ASSESSMENT/PLAN:         ICD-10-CM    1. Major depressive disorder, recurrent episode, mild (H) F33.0    2. Coronary artery disease involving native heart with angina pectoris, unspecified vessel or lesion type (H) I25.119 atorvastatin (LIPITOR) 40 MG tablet   3. CKD (chronic kidney disease) stage 3, " GFR 30-59 ml/min (H) N18.3 Basic metabolic panel   4. Idiopathic peripheral neuropathy G60.9 NEUROLOGY ADULT REFERRAL   5. Hypertension goal BP (blood pressure) < 140/90 I10 metoprolol tartrate (LOPRESSOR) 25 MG tablet     losartan (COZAAR) 50 MG tablet     hydrochlorothiazide (HYDRODIURIL) 25 MG tablet   6. Hyperlipidemia LDL goal <130 E78.5 atorvastatin (LIPITOR) 40 MG tablet   7. Snores R06.83 SLEEP EVALUATION & MANAGEMENT REFERRAL - ADULT -[facility]  [phone] (Age 15 and up)   1. Stable he is interested in decreasing dosage. Ok to cut in half and take half tablet for over 1 month, is stable will my chart if wants to con't to get off med. Recheck 6 months  2. stable ok for refills. Work on Healthy diet and exercise. Getting heart rate elevated for 30 mins most days of week.  3. Stop aleve and recheck labs in 4 wks.   4. Follow up  With Neurology.  5. Stable ok for refill  7 follow up  With sleep medicine.       Jesse Edgar PA-C  [facility]

## 2018-10-23 NOTE — MR AVS SNAPSHOT
After Visit Summary   10/23/2018    Sylvie Harvey    MRN: [id number]           Patient Information     Date Of Birth          1945        Visit Information        Provider Department      10/23/2018 9:40 AM Jesse Edgar PA-C [facility]        Today's Diagnoses     Major depressive disorder, recurrent episode, mild (H)    -  1    Coronary artery disease involving native heart with angina pectoris, unspecified vessel or lesion type (H)        CKD (chronic kidney disease) stage 3, GFR 30-59 ml/min (H)        Idiopathic peripheral neuropathy        Hypertension goal BP (blood pressure) < 140/90        Hyperlipidemia LDL goal <130        Snores           Follow-ups after your visit        Additional Services     NEUROLOGY ADULT REFERRAL       Your provider has referred you for the following:   Consult at [facility]: [facility] Neurology [facility] [phone]   [email or web address]  [facility]: [facility] [place] [facility] [phone]   [email or web address]/  [facility]: [facility] [phone]   [email or web address]  General Neurology  N: [facility] Neurology [facility] [phone]   [email or web address]    Please be aware that coverage of these services is subject to the terms and limitations of your health insurance plan.  Call member services at your health plan with any benefit or coverage questions.      Please bring the following with you to your appointment:    (1) Any X-Rays, CTs or MRIs which have been performed.  Contact the facility where they were done to arrange for  prior to your scheduled appointment.    (2) List of current medications  (3) This referral request   (4) Any documents/labs given to you for this referral            SLEEP  EVALUATION & MANAGEMENT REFERRAL - [facility] [place]  [phone] (Age 15 and up)       Please be aware that coverage of these services is subject to the terms and limitations of your health insurance plan.  Call member services at your health plan with any benefit or coverage questions.      Please bring the following to your appointment:    >>   List of current medications   >>   This referral request   >>   Any documents/labs given to you for this referral                      Future tests that were ordered for you today     Open Future Orders        Priority Expected Expires Ordered    SLEEP EVALUATION & MANAGEMENT REFERRAL - [facility] [place]  [phone] (Age 15 and up) Routine  10/23/2019 10/23/2018    Basic metabolic panel Routine 11/23/2018 4/26/2019 10/23/2018            Who to contact     If you have questions or need follow up information about today's clinic visit or your schedule please contact [facility] directly at [phone].  Normal or non-critical lab and imaging results will be communicated to you by Nginx[employer], letter or phone within 4 business days after the clinic has received the results. If you do not hear from us within 7 days, please contact the clinic through Nginxhart or phone. If you have a critical or abnormal lab result, we will notify you by phone as soon as possible.  Submit refill requests through [employer] or call your pharmacy and they will forward the refill request to us. Please allow 3 business days for your refill to be completed.          Additional Information About Your Visit        Nginx[employer] Information     [employer] gives you secure access to your electronic health record. If you see a primary care provider, you can also send messages to your care team and make appointments. If you have questions, please call your primary care clinic.  If you do not have a primary care provider, please call [phone] and  "they will assist you.        Care EveryWhere ID     This is your Care EveryWhere ID. This could be used by other organizations to access your [facility] medical records  [id number]        Your Vitals Were     Pulse Temperature Respirations Height Pulse Oximetry BMI (Body Mass Index)    68 97.2  F (36.2  C) (Oral) 16 5' 9.5\" (1.765 m) 96% 36.68 kg/m2       Blood Pressure from Last 3 Encounters:   10/23/18 128/77   04/24/18 118/70   10/24/17 137/67    Weight from Last 3 Encounters:   10/23/18 252 lb (114.3 kg)   04/24/18 242 lb (109.8 kg)   10/24/17 211 lb (95.7 kg)              We Performed the Following     NEUROLOGY ADULT REFERRAL          Where to get your medicines      These medications were sent to [facility] - [address], [facility] [address]     Phone:  [phone]     atorvastatin 40 MG tablet    hydrochlorothiazide 25 MG tablet    losartan 50 MG tablet    metoprolol tartrate 25 MG tablet          Primary Care Provider Office Phone # Fax #    Jesse Edgar PA-C [phone] [address] [facility] [address]        Equal Access to Services     MICHAEL CARVAJAL : Hadii aad ku hadasho Soomaali, waaxda luqadaha, qaybta kaalmada adeegyada, waxay idiin hayyoselynn aileen artis. So [facility] [phone].    ATENCIÓN: Si habla español, tiene a walker disposición servicios gratuitos de asistencia lingüística. Isabell al [phone].    We comply with applicable federal civil rights laws and [place] laws. We do not discriminate on the basis of race, color, national origin, age, disability, sex, sexual orientation, or gender identity.            Thank you!     Thank you for choosing [facility]  for your care. Our goal is always to provide you with excellent care. Hearing back from our patients is one way we can continue to improve our services. Please take a few minutes to complete the written survey that you may " receive in the mail after your visit with us. Thank you!             Your Updated Medication List - Protect others around you: Learn how to safely use, store and throw away your medicines at www.disposemymeds.org.          This list is accurate as of 10/23/18  9:59 AM.  Always use your most recent med list.                   Brand Name Dispense Instructions for use Diagnosis    aspirin 81 MG tablet      Take 1 tablet by mouth daily.        atorvastatin 40 MG tablet    LIPITOR    90 tablet    Take 1 tablet (40 mg) by mouth daily Last refill.  Needs appointment.    Coronary artery disease involving native heart with angina pectoris, unspecified vessel or lesion type (H), Hyperlipidemia LDL goal <130       buPROPion 300 MG 24 hr tablet    WELLBUTRIN XL    30 tablet    TAKE 1 TABLET BY MOUTH  EVERY MORNING    Mild major depression (H)       gabapentin 400 MG capsule    NEURONTIN    270 capsule    Take 1 capsule (400 mg) by mouth 3 times daily    Idiopathic peripheral neuropathy       hydrochlorothiazide 25 MG tablet    HYDRODIURIL    90 tablet    TAKE 1 TABLET BY MOUTH  DAILY    Hypertension goal BP (blood pressure) < 140/90       losartan 50 MG tablet    COZAAR    90 tablet    TAKE 1 TABLET BY MOUTH  DAILY    Hypertension goal BP (blood pressure) < 140/90       metoprolol tartrate 25 MG tablet    LOPRESSOR    180 tablet    TAKE 1 TABLET BY MOUTH TWO  TIMES DAILY    Hypertension goal BP (blood pressure) < 140/90       nitroGLYcerin 0.4 MG sublingual tablet    NITROSTAT    10 tablet    Place 1 tablet (0.4 mg) under the tongue every 5 minutes as needed for chest pain (may repeat x2)    CAD (coronary artery disease)       ticagrelor 60 MG tablet    BRILINTA     Take 60 mg by mouth 2 times daily

## 2018-10-23 NOTE — LETTER
My Depression Action Plan  Name: Sylvie Harvey   Date of Birth 1945  Date: 10/16/2018    My doctor: Jesse Edgar   My clinic: [facility]  [address] [place] [id number] [phone]          GREEN    ZONE   Good Control    What it looks like:     Things are going generally well. You have normal up s and down s. You may even feel depressed from time to time, but bad moods usually last less than a day.   What you need to do:  1. Continue to care for yourself (see self care plan)  2. Check your depression survival kit and update it as needed  3. Follow your physician s recommendations including any medication.  4. Do not stop taking medication unless you consult with your physician first.           YELLOW         ZONE Getting Worse    What it looks like:     Depression is starting to interfere with your life.     It may be hard to get out of bed; you may be starting to isolate yourself from others.    Symptoms of depression are starting to last most all day and this has happened for several days.     You may have suicidal thoughts but they are not constant.   What you need to do:     1. Call your care team, your response to treatment will improve if you keep your care team informed of your progress. Yellow periods are signs an adjustment may need to be made.     2. Continue your self-care, even if you have to fake it!    3. Talk to someone in your support network    4. Open up your depression survival kit           RED    ZONE Medical Alert - Get Help    What it looks like:     Depression is seriously interfering with your life.     You may experience these or other symptoms: You can t get out of bed most days, can t work or engage in other necessary activities, you have trouble taking care of basic hygiene, or basic responsibilities, thoughts of suicide or death that will not go away, self-injurious behavior.     What you need to do:  1. Call your care team  and request a same-day appointment. If they are not available (weekends or after hours) call your local crisis line, emergency room or 911.            Depression Self Care Plan / Survival Kit    Self-Care for Depression  Here s the deal. Your body and mind are really not as separate as most people think.  What you do and think affects how you feel and how you feel influences what you do and think. This means if you do things that people who feel good do, it will help you feel better.  Sometimes this is all it takes.  There is also a place for medication and therapy depending on how severe your depression is, so be sure to consult with your medical provider and/ or Behavioral Health Consultant if your symptoms are worsening or not improving.     In order to better manage my stress, I will:    Exercise  Get some form of exercise, every day. This will help reduce pain and release endorphins, the  feel good  chemicals in your brain. This is almost as good as taking antidepressants!  This is not the same as joining a gym and then never going! (they count on that by the way ) It can be as simple as just going for a walk or doing some gardening, anything that will get you moving.      Hygiene   Maintain good hygiene (Get out of bed in the morning, Make your bed, Brush your teeth, Take a shower, and Get dressed like you were going to work, even if you are unemployed).  If your clothes don't fit try to get ones that do.    Diet  I will strive to eat foods that are good for me, drink plenty of water, and avoid excessive sugar, caffeine, alcohol, and other mood-altering substances.  Some foods that are helpful in depression are: complex carbohydrates, B vitamins, flaxseed, fish or fish oil, fresh fruits and vegetables.    Psychotherapy  I agree to participate in Individual Therapy (if recommended).    Medication  If prescribed medications, I agree to take them.  Missing doses can result in serious side effects.  I understand  that drinking alcohol, or other illicit drug use, may cause potential side effects.  I will not stop my medication abruptly without first discussing it with my provider.    Staying Connected With Others  I will stay in touch with my friends, family members, and my primary care provider/team.    Use your imagination  Be creative.  We all have a creative side; it doesn t matter if it s oil painting, sand castles, or mud pies! This will also kick up the endorphins.    Witness Beauty  (AKA stop and smell the roses) Take a look outside, even in mid-winter. Notice colors, textures. Watch the squirrels and birds.     Service to others  Be of service to others.  There is always someone else in need.  By helping others we can  get out of ourselves  and remember the really important things.  This also provides opportunities for practicing all the other parts of the program.    Humor  Laugh and be silly!  Adjust your TV habits for less news and crime-drama and more comedy.    Control your stress  Try breathing deep, massage therapy, biofeedback, and meditation. Find time to relax each day.     My support system    Clinic Contact:  Phone number:    Contact 1:  Phone number:    Contact 2:  Phone number:    [place]ats[place]/:  Phone number:    Therapist:  Phone number:    Local crisis center:    Phone number:    Other community support:  Phone number:

## 2018-10-24 ENCOUNTER — TELEPHONE (OUTPATIENT)
Dept: FAMILY MEDICINE | Facility: CLINIC | Age: 73
End: 2018-10-24

## 2018-10-24 ASSESSMENT — ANXIETY QUESTIONNAIRES: GAD7 TOTAL SCORE: 0

## 2018-10-24 ASSESSMENT — PATIENT HEALTH QUESTIONNAIRE - PHQ9: SUM OF ALL RESPONSES TO PHQ QUESTIONS 1-9: 0

## 2018-10-24 NOTE — TELEPHONE ENCOUNTER
Reason for Call:  Form, our goal is to have forms completed with 72 hours, however, some forms may require a visit or additional information.    Type of letter, form or note:  Cologuard order requistion form    Who is the form from?: Patient    Where did the form come from: Patient or family brought in       What clinic location was the form placed at?: [place]    Where the form was placed: [facility]    What number is listed as a contact on the form?: [phone]       Additional comments: N/A    Call taken on 10/24/2018 at 2:05 PM by Karli Conway

## 2018-10-31 ENCOUNTER — TRANSFERRED RECORDS (OUTPATIENT)
Dept: HEALTH INFORMATION MANAGEMENT | Facility: CLINIC | Age: 73
End: 2018-10-31

## 2018-11-03 ENCOUNTER — TRANSFERRED RECORDS (OUTPATIENT)
Dept: HEALTH INFORMATION MANAGEMENT | Facility: CLINIC | Age: 73
End: 2018-11-03

## 2018-11-03 LAB — COLOGUARD-ABSTRACT: POSITIVE

## 2018-11-12 ENCOUNTER — TELEPHONE (OUTPATIENT)
Dept: FAMILY MEDICINE | Facility: CLINIC | Age: 73
End: 2018-11-12

## 2018-11-12 DIAGNOSIS — Z12.11 SPECIAL SCREENING FOR MALIGNANT NEOPLASMS, COLON: Primary | ICD-10-CM

## 2018-11-12 NOTE — TELEPHONE ENCOUNTER
Patient has a positive Cologuard result. Please review results that were placed in your basket and determine  Follow up instructions. When complete sign results and rout back to [facility] team. Krysten Xie TC

## 2018-11-13 NOTE — TELEPHONE ENCOUNTER
Please inform patient of positive cologuard testing and now needs colonoscopy.  Jesse Edgar PA-C

## 2018-11-14 NOTE — TELEPHONE ENCOUNTER
Spoke with patient.  Made aware his cologuard test was positive and he will need a colonoscopy.  He is driving so requested I leave scheduling number on voicemail.  I called back immediately and did this.  Phone # [phone]. Akua Pino RN

## 2018-12-07 ENCOUNTER — TELEPHONE (OUTPATIENT)
Dept: FAMILY MEDICINE | Facility: CLINIC | Age: 73
End: 2018-12-07

## 2018-12-07 DIAGNOSIS — N18.30 CKD (CHRONIC KIDNEY DISEASE) STAGE 3, GFR 30-59 ML/MIN (H): ICD-10-CM

## 2018-12-07 LAB
ANION GAP SERPL CALCULATED.3IONS-SCNC: 9 MMOL/L (ref 3–14)
BUN SERPL-MCNC: 26 MG/DL (ref 7–30)
CALCIUM SERPL-MCNC: 9 MG/DL (ref 8.5–10.1)
CHLORIDE SERPL-SCNC: 103 MMOL/L (ref 94–109)
CO2 SERPL-SCNC: 25 MMOL/L (ref 20–32)
CREAT SERPL-MCNC: 1.38 MG/DL (ref 0.66–1.25)
GFR SERPL CREATININE-BSD FRML MDRD: 50 ML/MIN/1.7M2
GLUCOSE SERPL-MCNC: 94 MG/DL (ref 70–99)
POTASSIUM SERPL-SCNC: 4 MMOL/L (ref 3.4–5.3)
SODIUM SERPL-SCNC: 137 MMOL/L (ref 133–144)

## 2018-12-07 PROCEDURE — 36415 COLL VENOUS BLD VENIPUNCTURE: CPT | Performed by: PHYSICIAN ASSISTANT

## 2018-12-07 PROCEDURE — 80048 BASIC METABOLIC PNL TOTAL CA: CPT | Performed by: PHYSICIAN ASSISTANT

## 2018-12-07 NOTE — TELEPHONE ENCOUNTER
Pt is requesting new rx for Suprep for his upcoming colonoscopy on 12/14/18.  He said he became violently ill after using the Golytely the last time.  Please send new rx.    Thank you,  Margarita Abarca, [facility]  [phone]

## 2018-12-07 NOTE — PROGRESS NOTES
Mr. Harvey,    All of your labs were normal/ .stable for you.    Please contact the clinic if you have additional questions.  Thank you.    Sincerely,    Jesse Edgar PA-C

## 2018-12-14 ENCOUNTER — HOSPITAL ENCOUNTER (OUTPATIENT)
Facility: AMBULATORY SURGERY CENTER | Age: 73
Discharge: HOME | End: 2018-12-14
Attending: INTERNAL MEDICINE | Admitting: INTERNAL MEDICINE
Payer: COMMERCIAL

## 2018-12-14 VITALS
TEMPERATURE: 97.8 F | OXYGEN SATURATION: 93 % | DIASTOLIC BLOOD PRESSURE: 79 MMHG | RESPIRATION RATE: 16 BRPM | SYSTOLIC BLOOD PRESSURE: 130 MMHG

## 2018-12-14 LAB — COLONOSCOPY: NORMAL

## 2018-12-14 PROCEDURE — G8907 PT DOC NO EVENTS ON DISCHARG: HCPCS

## 2018-12-14 PROCEDURE — 88305 TISSUE EXAM BY PATHOLOGIST: CPT | Performed by: INTERNAL MEDICINE

## 2018-12-14 PROCEDURE — 45385 COLONOSCOPY W/LESION REMOVAL: CPT

## 2018-12-14 PROCEDURE — G8918 PT W/O PREOP ORDER IV AB PRO: HCPCS

## 2018-12-14 RX ORDER — FENTANYL CITRATE 50 UG/ML
INJECTION, SOLUTION INTRAMUSCULAR; INTRAVENOUS PRN
Status: DISCONTINUED | OUTPATIENT
Start: 2018-12-14 | End: 2018-12-14 | Stop reason: HOSPADM

## 2018-12-14 RX ORDER — ONDANSETRON 2 MG/ML
4 INJECTION INTRAMUSCULAR; INTRAVENOUS
Status: DISCONTINUED | OUTPATIENT
Start: 2018-12-14 | End: 2018-12-15 | Stop reason: HOSPADM

## 2018-12-14 RX ORDER — LIDOCAINE 40 MG/G
CREAM TOPICAL
Status: DISCONTINUED | OUTPATIENT
Start: 2018-12-14 | End: 2018-12-15 | Stop reason: HOSPADM

## 2018-12-18 ENCOUNTER — OFFICE VISIT (OUTPATIENT)
Dept: FAMILY MEDICINE | Facility: CLINIC | Age: 73
End: 2018-12-18
Payer: COMMERCIAL

## 2018-12-18 VITALS
RESPIRATION RATE: 16 BRPM | SYSTOLIC BLOOD PRESSURE: 128 MMHG | WEIGHT: 248 LBS | HEART RATE: 71 BPM | OXYGEN SATURATION: 95 % | BODY MASS INDEX: 36.1 KG/M2 | TEMPERATURE: 98 F | DIASTOLIC BLOOD PRESSURE: 74 MMHG

## 2018-12-18 DIAGNOSIS — E78.5 HYPERLIPIDEMIA LDL GOAL <130: ICD-10-CM

## 2018-12-18 DIAGNOSIS — I25.119 CORONARY ARTERY DISEASE INVOLVING NATIVE HEART WITH ANGINA PECTORIS, UNSPECIFIED VESSEL OR LESION TYPE (H): ICD-10-CM

## 2018-12-18 DIAGNOSIS — I10 HYPERTENSION GOAL BP (BLOOD PRESSURE) < 140/90: Primary | ICD-10-CM

## 2018-12-18 DIAGNOSIS — F32.0 MILD MAJOR DEPRESSION (H): ICD-10-CM

## 2018-12-18 DIAGNOSIS — R06.83 SNORES: ICD-10-CM

## 2018-12-18 LAB — COPATH REPORT: NORMAL

## 2018-12-18 PROCEDURE — 99214 OFFICE O/P EST MOD 30 MIN: CPT | Performed by: PHYSICIAN ASSISTANT

## 2018-12-18 RX ORDER — LOSARTAN POTASSIUM 50 MG/1
TABLET ORAL
Qty: 90 TABLET | Refills: 1 | Status: SHIPPED | OUTPATIENT
Start: 2018-12-18 | End: 2019-06-26

## 2018-12-18 RX ORDER — BUPROPION HYDROCHLORIDE 300 MG/1
300 TABLET ORAL EVERY MORNING
Qty: 90 TABLET | Refills: 1 | Status: SHIPPED | OUTPATIENT
Start: 2018-12-18 | End: 2018-12-18 | Stop reason: DRUGHIGH

## 2018-12-18 RX ORDER — BUPROPION HYDROCHLORIDE 150 MG/1
150 TABLET ORAL EVERY MORNING
Qty: 90 TABLET | Refills: 0 | Status: SHIPPED | OUTPATIENT
Start: 2018-12-18 | End: 2018-12-21

## 2018-12-18 RX ORDER — HYDROCHLOROTHIAZIDE 25 MG/1
TABLET ORAL
Qty: 90 TABLET | Refills: 1 | Status: SHIPPED | OUTPATIENT
Start: 2018-12-18 | End: 2019-06-01

## 2018-12-18 RX ORDER — METOPROLOL TARTRATE 25 MG/1
TABLET, FILM COATED ORAL
Qty: 180 TABLET | Refills: 1 | Status: SHIPPED | OUTPATIENT
Start: 2018-12-18 | End: 2019-06-26

## 2018-12-18 RX ORDER — ATORVASTATIN CALCIUM 40 MG/1
40 TABLET, FILM COATED ORAL DAILY
Qty: 90 TABLET | Refills: 3 | Status: SHIPPED | OUTPATIENT
Start: 2018-12-18 | End: 2020-02-18

## 2018-12-18 NOTE — PROGRESS NOTES
SUBJECTIVE:                                                    Sylvie Harvey is a 73 year old male who presents to clinic today for the following health issues:    Hypertension Follow-up      Outpatient blood pressures are being checked at home.  Results are 132/70.    Low Salt Diet: no added salt      Amount of exercise or physical activity: None    Problems taking medications regularly: No    Medication side effects: none    Diet: regular (no restrictions)  History of elevated creatinine and stopped nsaids and here for lab follow up .     Also history of depression and has been cutting pills in hood and no concerns.   Is wanting to eventually get off meds.     Problem list and histories reviewed & adjusted, as indicated.  Additional history: as documented    Patient Active Problem List   Diagnosis     Advanced directives, counseling/discussion     Hyperlipidemia LDL goal <130     Pain in shoulder     Plantar fasciitis     Medial meniscus tear     Coronary artery disease involving native heart with angina pectoris, unspecified vessel or lesion type (H)     Arthritis     Major depressive disorder, recurrent episode, mild (H)     Idiopathic peripheral neuropathy     CKD (chronic kidney disease) stage 3, GFR 30-59 ml/min (H)     Past Surgical History:   Procedure Laterality Date     APPENDECTOMY       COLONOSCOPY WITH CO2 INSUFFLATION N/A 2018    Procedure: COLONOSCOPY WITH CO2 INSUFFLATION;  Surgeon: Duane, William Charles, MD;  Location: [facility] OR     ORTHOPEDIC SURGERY Right     Scar over dorsum aspect of right wrist.      ORTHOPEDIC SURGERY Right     Knee     ORTHOPEDIC SURGERY Bilateral     CTS release     SURGICAL HISTORY OF -       Umbilical Hernia     SURGICAL HISTORY OF -       Coronary Stent       Social History     Tobacco Use     Smoking status: Former Smoker     Last attempt to quit: 1983     Years since quittin.6     Smokeless tobacco: Never Used   Substance Use Topics     Alcohol  use: No     Alcohol/week: 0.0 oz     Comment: Quit 25 years ago (2013)     History reviewed. No pertinent family history.      Current Outpatient Medications   Medication Sig Dispense Refill     aspirin 81 MG tablet Take 1 tablet by mouth daily.       atorvastatin (LIPITOR) 40 MG tablet Take 1 tablet (40 mg) by mouth daily 90 tablet 3     buPROPion (WELLBUTRIN XL) 150 MG 24 hr tablet Take 1 tablet (150 mg) by mouth every morning 90 tablet 0     gabapentin (NEURONTIN) 400 MG capsule Take 1 capsule (400 mg) by mouth 3 times daily 270 capsule 1     hydrochlorothiazide (HYDRODIURIL) 25 MG tablet TAKE 1 TABLET BY MOUTH  DAILY 90 tablet 1     losartan (COZAAR) 50 MG tablet TAKE 1 TABLET BY MOUTH  DAILY 90 tablet 1     metoprolol tartrate (LOPRESSOR) 25 MG tablet TAKE 1 TABLET BY MOUTH TWO  TIMES DAILY 180 tablet 1     nitroglycerin (NITROSTAT) 0.4 MG sublingual tablet Place 1 tablet (0.4 mg) under the tongue every 5 minutes as needed for chest pain (may repeat x2) 10 tablet 3     ticagrelor (BRILINTA) 60 MG tablet Take 60 mg by mouth 2 times daily       Allergies   Allergen Reactions     Sulfa Drugs      BP Readings from Last 3 Encounters:   12/18/18 128/74   12/14/18 130/79   10/23/18 128/77    Wt Readings from Last 3 Encounters:   12/18/18 112.5 kg (248 lb)   10/23/18 114.3 kg (252 lb)   04/24/18 109.8 kg (242 lb)                  Labs reviewed in EPIC    ROS:  Constitutional, HEENT, cardiovascular, pulmonary, gi and gu systems are negative, except as otherwise noted.    OBJECTIVE:     /74   Pulse 71   Temp 98  F (36.7  C) (Oral)   Resp 16   Wt 112.5 kg (248 lb)   SpO2 95%   BMI 36.10 kg/m    Body mass index is 36.1 kg/m .  GENERAL: healthy, alert and no distress  NECK: no adenopathy, no asymmetry, masses, or scars and thyroid normal to palpation  RESP: lungs clear to auscultation - no rales, rhonchi or wheezes  CV: regular rate and rhythm, normal S1 S2, no S3 or S4, no murmur, click or rub, no peripheral  edema and peripheral pulses strong  ABDOMEN: soft, nontender, no hepatosplenomegaly, no masses and bowel sounds normal  MS: no gross musculoskeletal defects noted, no edema    Diagnostic Test Results:  Results for orders placed or performed during the hospital encounter of 12/14/18   COLONOSCOPY   Result Value Ref Range    COLONOSCOPY       [facility]  Endoscopy Department-[place]  _______________________________________________________________________________  Patient Name: Sylvie Harvey        Procedure Date: 12/14/2018 7:26 AM  MRN: [id number]                       YOB: 1945  Admit Type: Outpatient                Age: 73  Gender: Male                          Note Status: Finalized  Attending MD: William C. Duane ,       Instrument Name: [id number]  _______________________________________________________________________________     Procedure:                Colonoscopy  Indications:              Positive Cologuard test  Providers:                William C. Duane, Lori L. Ruckle, RN  Referring MD:             Jesse Edgar MD  Medicines:                Fentanyl 100 micrograms IV, Midazolam 2 mg IV  Complications:            No immediate complications.  _______________________________________________________________________________  Procedure:                Pr e-Anesthesia Assessment:                            - Prior to the procedure, a History and Physical                             was performed, and patient medications and                             allergies were reviewed. The risks and benefits of                             the procedure and the sedation options and risks                             were discussed with the patient. All questions were                             answered and informed consent was obtained. Patient                             identification and proposed procedure were verified                             by the  physician and the nurse in the pre-procedure                             area. Mental Status Examination: alert and                             oriented. Airway Examination: normal oropharyngeal                             airway and neck mobility. Respiratory Examination:                             clear to auscultation. CV Examination: normal.                             Prophylactic Antibio tics: The patient does not                             require prophylactic antibiotics. Prior                             Anticoagulants: The patient has taken Brilinta,                             last dose was 5 days prior to procedure. ASA Grade                             Assessment: III - A patient with severe systemic                             disease. After reviewing the risks and benefits,                             the patient was deemed in satisfactory condition to                             undergo the procedure. The anesthesia plan was to                             use moderate sedation / analgesia (conscious                             sedation). This assessment was completed before the                             administration of sedation at 07:30 AM.                            After obtaining informed consent, the colonoscope                             was passed under direct vision. Throughout the                             procedure, the patient's blood pressu re, pulse, and                             oxygen saturations were monitored continuously. The                             Colonoscope was introduced through the anus and                             advanced to the cecum, identified by appendiceal                             orifice and ileocecal valve. The colonoscopy was                             performed without difficulty. The patient tolerated                             the procedure well. The quality of the bowel                             preparation was excellent.                                                                                    Findings:       Two sessile polyps were found in the hepatic flexure. The polyps were 5        mm in size. These polyps were removed with a cold snare. Resection and        retrieval were complete.       A 5 mm polyp was found in the splenic flexure. The polyp was sessile.        The polyp was removed with a cold snare. Resection and retrieval were        complete.                                                                                    Moderate Sedation:       Moderate (conscious) sedation was administered by the endoscopy nurse        and supervised by the endoscopist. The following parameters were        monitored: oxygen saturation, heart rate, blood pressure, and response        to care. Total physician intraservice time was 36 minutes.  Impression:               - Two 5 mm polyps at the hepatic flexure, removed                             with a cold snare. Resected and retrieved.                            - One 5 mm polyp at the splenic flexure, removed                             with a cold snare. Resected and retrieved.  Recommendation:           - Await pathology results.                            - If the pathology shows adenoma in all three                             polyps, repeat colonoscopy in 3 years. If one or                             two are adenomas, repeat in 5 years. If none are                             adenomas, repeat in  10 years.                            - OK to resume Brilinta tomorrow                                                                                     __________________  William C. Duane,   12/14/2018 8:20:19 AM  I was physically present for the entire viewing portion of the exam.William Duane  Number of Addenda: 0    Note Initiated On: 12/14/2018 7:26 AM  Scope In:  Scope Out:         ASSESSMENT/PLAN:       ICD-10-CM    1. Hypertension goal BP (blood pressure) < 140/90 I10  hydrochlorothiazide (HYDRODIURIL) 25 MG tablet     losartan (COZAAR) 50 MG tablet     metoprolol tartrate (LOPRESSOR) 25 MG tablet   2. Coronary artery disease involving native heart with angina pectoris, unspecified vessel or lesion type (H) I25.119 atorvastatin (LIPITOR) 40 MG tablet   3. Hyperlipidemia LDL goal <130 E78.5 atorvastatin (LIPITOR) 40 MG tablet   4. Mild major depression (H) F32.0 buPROPion (WELLBUTRIN XL) 150 MG 24 hr tablet     DISCONTINUED: buPROPion (WELLBUTRIN XL) 300 MG 24 hr tablet   5. Snores R06.83 [place]ALUATION [place]NAGEMENT [place] [facility] [place]  [phone] (Age 15 and up)   1. Stable ok for refills.   2. See cardiology yearly.   3. Stable ok of refills  4. Ok to taper down to 150mg daily.   5. Follow up  With sleep medicine  Recheck 6 months    Jesse Edgar PA-C  [facility]

## 2018-12-20 ENCOUNTER — TELEPHONE (OUTPATIENT)
Dept: FAMILY MEDICINE | Facility: CLINIC | Age: 73
End: 2018-12-20

## 2018-12-20 DIAGNOSIS — F32.0 MILD MAJOR DEPRESSION (H): ICD-10-CM

## 2018-12-20 NOTE — TELEPHONE ENCOUNTER
" [facility] sending a fax requesting a medical clarification. \" the patinet has been prescribed BuPROPion XL tab 150mg and BuPROPion XL tab 300mg 24 HR, which is a possible duplication of therapy. Should the patient be on both medications?    [facility] [phone]  [place]-[phone]      Deidra Greenwood CMA    "

## 2018-12-21 RX ORDER — BUPROPION HYDROCHLORIDE 150 MG/1
150 TABLET ORAL EVERY MORNING
Qty: 90 TABLET | Refills: 0 | Status: SHIPPED | OUTPATIENT
Start: 2018-12-21 | End: 2019-06-26

## 2019-01-05 ENCOUNTER — TRANSFERRED RECORDS (OUTPATIENT)
Dept: HEALTH INFORMATION MANAGEMENT | Facility: CLINIC | Age: 74
End: 2019-01-05

## 2019-01-05 LAB
CREAT SERPL-MCNC: 1.31 MG/DL (ref 0.72–1.25)
GFR SERPL CREATININE-BSD FRML MDRD: 54 ML/MIN/1.73M2
GLUCOSE SERPL-MCNC: 98 MG/DL (ref 65–100)
POTASSIUM SERPL-SCNC: 4 MMOL/L (ref 3.5–5)

## 2019-01-10 ENCOUNTER — TRANSFERRED RECORDS (OUTPATIENT)
Dept: HEALTH INFORMATION MANAGEMENT | Facility: CLINIC | Age: 74
End: 2019-01-10

## 2019-02-13 DIAGNOSIS — F32.0 MILD MAJOR DEPRESSION (H): ICD-10-CM

## 2019-02-13 RX ORDER — BUPROPION HYDROCHLORIDE 150 MG/1
TABLET ORAL
Qty: 90 TABLET | Refills: 0 | Status: SHIPPED | OUTPATIENT
Start: 2019-02-13 | End: 2019-06-01

## 2019-06-01 DIAGNOSIS — I10 HYPERTENSION GOAL BP (BLOOD PRESSURE) < 140/90: ICD-10-CM

## 2019-06-01 DIAGNOSIS — F32.0 MILD MAJOR DEPRESSION (H): ICD-10-CM

## 2019-06-01 NOTE — LETTER
June 4, 2019    Sylvie Harvey  [address] [address] [facility] [address]    Dear Sylvie,       We recently received a refill request for hydrochlorothiazide and bupropion.  We have refilled this for a one time 90 day supply only because you are due for a:    Blood pressure and depression office visit and fasting lab appointment      Please schedule this lab appointment 4-5 days prior to the office visit.     Please call at your earliest convenience so that there will not be a delay with your future refills.          Thank you,   Your [facility] Team/  [phone]

## 2019-06-03 NOTE — TELEPHONE ENCOUNTER
PHQ-9 SCORE 10/10/2017 4/24/2018 10/23/2018   PHQ-9 Total Score - - -   PHQ-9 Total Score 0 1 0     Per OV note dated 12/18/18 from Jesse Edgar is as follows:    Return in about 6 months (around 6/18/2019) for BP Recheck.     Provider:  There is an abstracted creatinine dated 1/5/19 that is elevated, ANGUS Vasquez R.N.

## 2019-06-04 RX ORDER — HYDROCHLOROTHIAZIDE 25 MG/1
TABLET ORAL
Qty: 90 TABLET | Refills: 1 | Status: SHIPPED | OUTPATIENT
Start: 2019-06-04 | End: 2020-02-18

## 2019-06-04 RX ORDER — BUPROPION HYDROCHLORIDE 150 MG/1
TABLET ORAL
Qty: 90 TABLET | Refills: 0 | Status: SHIPPED | OUTPATIENT
Start: 2019-06-04 | End: 2019-06-26 | Stop reason: DRUGHIGH

## 2019-06-19 ENCOUNTER — DOCUMENTATION ONLY (OUTPATIENT)
Dept: LAB | Facility: CLINIC | Age: 74
End: 2019-06-19

## 2019-06-19 DIAGNOSIS — N18.30 CKD (CHRONIC KIDNEY DISEASE) STAGE 3, GFR 30-59 ML/MIN (H): ICD-10-CM

## 2019-06-19 DIAGNOSIS — E78.5 HYPERLIPIDEMIA LDL GOAL <130: Primary | ICD-10-CM

## 2019-06-19 NOTE — PROGRESS NOTES
Your patient has a lab appointment tomorrow 6/20/19. Please place orders for blood work. Thank you.      An lab  Kinga Yusuf on 6/19/2019 at 3:24 PM

## 2019-06-20 ENCOUNTER — DOCUMENTATION ONLY (OUTPATIENT)
Dept: FAMILY MEDICINE | Facility: CLINIC | Age: 74
End: 2019-06-20

## 2019-06-20 DIAGNOSIS — N18.30 CKD (CHRONIC KIDNEY DISEASE) STAGE 3, GFR 30-59 ML/MIN (H): ICD-10-CM

## 2019-06-20 DIAGNOSIS — E78.5 HYPERLIPIDEMIA LDL GOAL <130: ICD-10-CM

## 2019-06-20 DIAGNOSIS — Z12.5 SCREENING PSA (PROSTATE SPECIFIC ANTIGEN): ICD-10-CM

## 2019-06-20 DIAGNOSIS — Z12.5 SCREENING PSA (PROSTATE SPECIFIC ANTIGEN): Primary | ICD-10-CM

## 2019-06-20 LAB
ANION GAP SERPL CALCULATED.3IONS-SCNC: 9 MMOL/L (ref 3–14)
BUN SERPL-MCNC: 28 MG/DL (ref 7–30)
CALCIUM SERPL-MCNC: 8.8 MG/DL (ref 8.5–10.1)
CHLORIDE SERPL-SCNC: 104 MMOL/L (ref 94–109)
CHOLEST SERPL-MCNC: 126 MG/DL
CO2 SERPL-SCNC: 24 MMOL/L (ref 20–32)
CREAT SERPL-MCNC: 1.36 MG/DL (ref 0.66–1.25)
GFR SERPL CREATININE-BSD FRML MDRD: 51 ML/MIN/{1.73_M2}
GLUCOSE SERPL-MCNC: 102 MG/DL (ref 70–99)
HDLC SERPL-MCNC: 35 MG/DL
LDLC SERPL CALC-MCNC: 44 MG/DL
NONHDLC SERPL-MCNC: 91 MG/DL
POTASSIUM SERPL-SCNC: 4 MMOL/L (ref 3.4–5.3)
PSA SERPL-ACNC: 2.2 UG/L (ref 0–4)
SODIUM SERPL-SCNC: 137 MMOL/L (ref 133–144)
TRIGL SERPL-MCNC: 233 MG/DL

## 2019-06-20 PROCEDURE — 36415 COLL VENOUS BLD VENIPUNCTURE: CPT | Performed by: PHYSICIAN ASSISTANT

## 2019-06-20 PROCEDURE — 80061 LIPID PANEL: CPT | Performed by: PHYSICIAN ASSISTANT

## 2019-06-20 PROCEDURE — G0103 PSA SCREENING: HCPCS | Performed by: PHYSICIAN ASSISTANT

## 2019-06-20 PROCEDURE — 80048 BASIC METABOLIC PNL TOTAL CA: CPT | Performed by: PHYSICIAN ASSISTANT

## 2019-06-25 NOTE — PROGRESS NOTES
Fasting labs were ordered on Sylvie but the patient mentioned wanting a PSA done as well. Please place future PSA order as well or contact patient if not needed.    Thank you,  Lab   25-Jun-2019 04:16

## 2019-06-26 ENCOUNTER — OFFICE VISIT (OUTPATIENT)
Dept: FAMILY MEDICINE | Facility: CLINIC | Age: 74
End: 2019-06-26
Payer: COMMERCIAL

## 2019-06-26 VITALS
OXYGEN SATURATION: 96 % | BODY MASS INDEX: 36.51 KG/M2 | RESPIRATION RATE: 16 BRPM | HEIGHT: 70 IN | HEART RATE: 83 BPM | TEMPERATURE: 98.1 F | SYSTOLIC BLOOD PRESSURE: 135 MMHG | WEIGHT: 255 LBS | DIASTOLIC BLOOD PRESSURE: 76 MMHG

## 2019-06-26 DIAGNOSIS — Z77.090 ASBESTOS EXPOSURE: ICD-10-CM

## 2019-06-26 DIAGNOSIS — N18.30 CKD (CHRONIC KIDNEY DISEASE) STAGE 3, GFR 30-59 ML/MIN (H): ICD-10-CM

## 2019-06-26 DIAGNOSIS — R06.02 SOB (SHORTNESS OF BREATH): ICD-10-CM

## 2019-06-26 DIAGNOSIS — F33.0 MAJOR DEPRESSIVE DISORDER, RECURRENT EPISODE, MILD (H): Primary | ICD-10-CM

## 2019-06-26 DIAGNOSIS — R06.83 SNORES: ICD-10-CM

## 2019-06-26 DIAGNOSIS — G60.9 IDIOPATHIC PERIPHERAL NEUROPATHY: ICD-10-CM

## 2019-06-26 DIAGNOSIS — I10 HYPERTENSION GOAL BP (BLOOD PRESSURE) < 140/90: ICD-10-CM

## 2019-06-26 DIAGNOSIS — I25.119 CORONARY ARTERY DISEASE INVOLVING NATIVE HEART WITH ANGINA PECTORIS, UNSPECIFIED VESSEL OR LESION TYPE (H): ICD-10-CM

## 2019-06-26 PROCEDURE — 99214 OFFICE O/P EST MOD 30 MIN: CPT | Performed by: PHYSICIAN ASSISTANT

## 2019-06-26 RX ORDER — LOSARTAN POTASSIUM 50 MG/1
TABLET ORAL
Qty: 90 TABLET | Refills: 1 | Status: SHIPPED | OUTPATIENT
Start: 2019-06-26 | End: 2019-11-17

## 2019-06-26 RX ORDER — BUPROPION HYDROCHLORIDE 300 MG/1
300 TABLET ORAL EVERY MORNING
Qty: 90 TABLET | Refills: 1 | Status: SHIPPED | OUTPATIENT
Start: 2019-06-26 | End: 2019-11-17

## 2019-06-26 RX ORDER — GABAPENTIN 400 MG/1
400 CAPSULE ORAL 3 TIMES DAILY
Qty: 270 CAPSULE | Refills: 1 | Status: SHIPPED | OUTPATIENT
Start: 2019-06-26 | End: 2019-11-17

## 2019-06-26 RX ORDER — METOPROLOL TARTRATE 25 MG/1
TABLET, FILM COATED ORAL
Qty: 180 TABLET | Refills: 1 | Status: SHIPPED | OUTPATIENT
Start: 2019-06-26 | End: 2019-11-17

## 2019-06-26 ASSESSMENT — ANXIETY QUESTIONNAIRES
GAD7 TOTAL SCORE: 0
2. NOT BEING ABLE TO STOP OR CONTROL WORRYING: NOT AT ALL
6. BECOMING EASILY ANNOYED OR IRRITABLE: NOT AT ALL
5. BEING SO RESTLESS THAT IT IS HARD TO SIT STILL: NOT AT ALL
3. WORRYING TOO MUCH ABOUT DIFFERENT THINGS: NOT AT ALL
1. FEELING NERVOUS, ANXIOUS, OR ON EDGE: NOT AT ALL
7. FEELING AFRAID AS IF SOMETHING AWFUL MIGHT HAPPEN: NOT AT ALL
IF YOU CHECKED OFF ANY PROBLEMS ON THIS QUESTIONNAIRE, HOW DIFFICULT HAVE THESE PROBLEMS MADE IT FOR YOU TO DO YOUR WORK, TAKE CARE OF THINGS AT HOME, OR GET ALONG WITH OTHER PEOPLE: NOT DIFFICULT AT ALL

## 2019-06-26 ASSESSMENT — PATIENT HEALTH QUESTIONNAIRE - PHQ9
5. POOR APPETITE OR OVEREATING: NOT AT ALL
SUM OF ALL RESPONSES TO PHQ QUESTIONS 1-9: 0

## 2019-06-26 ASSESSMENT — MIFFLIN-ST. JEOR: SCORE: 1894.98

## 2019-06-26 NOTE — PROGRESS NOTES
Subjective     Sylvie Harvey is a 74 year old male seen today who  has a past medical history of Alcohol abuse, Arthritis, CKD (chronic kidney disease) stage 3, GFR 30-59 ml/min (H) (10/23/2018), Coronary artery disease involving native heart with angina pectoris, unspecified vessel or lesion type (H) (2016), Drug abuse (H), Headaches, Hyperlipidemia LDL goal <130 (2013), Hypertension, Idiopathic peripheral neuropathy (10/23/2018), and Major depressive disorder, recurrent episode, mild (H) (10/18/2016).   who presents to clinic today for the following health issues:       HPI   Hyperlipidemia Follow-Up      Are you having any of the following symptoms? (Select all that apply)  Shortness of breath    Are you regularly taking any medication or supplement to lower your cholesterol?   Yes- atorvastatin     Are you having muscle aches or other side effects that you think could be caused by your cholesterol lowering medication?  No    Depression Followup    How are you doing with your depression since your last visit? Would like to discuss going back to Wellbutrin 300mg - felt better on higher dose    Are you having other symptoms that might be associated with depression? No    Have you had a significant life event?  Added stress     Are you feeling anxious or having panic attacks?   No    Do you have any concerns with your use of alcohol or other drugs? No    Social History     Tobacco Use     Smoking status: Former Smoker     Last attempt to quit: 1983     Years since quittin.2     Smokeless tobacco: Never Used   Substance Use Topics     Alcohol use: No     Alcohol/week: 0.0 oz     Comment: Quit 25 years ago ()     Drug use: No     PHQ 2018 10/23/2018 2019   PHQ-9 Total Score 1 0 0   Q9: Thoughts of better off dead/self-harm past 2 weeks Not at all Not at all Not at all     ARY-7 SCORE 10/22/2015 10/23/2018 2019   Total Score - - -   Total Score 0 0 0     History of peripheral  neuropathy and neurotin helps.     Also couple years of gradual short of breath. Worse at night. Will gasp for air at times. Snores. Mentions concerns of previous work exposure with asbestos and wondering if that has caused him short of breath.   No chest pain.   History of CAD and see cardiology yearly.   History of CKD 3 and has stabilized since stopping non-steroidal anti-inflammatory medication    No flowsheet data found.  In the past two weeks have you had thoughts of suicide or self-harm?  No.    Do you have concerns about your personal safety or the safety of others?   No    Suicide Assessment Five-step Evaluation and Treatment (SAFE-T)    Patient Active Problem List   Diagnosis     Advanced directives, counseling/discussion     Hypertension goal BP (blood pressure) < 140/90     Hyperlipidemia LDL goal <130     Pain in shoulder     Plantar fasciitis     Medial meniscus tear     Coronary artery disease involving native heart with angina pectoris, unspecified vessel or lesion type (H)     Arthritis     Major depressive disorder, recurrent episode, mild (H)     Idiopathic peripheral neuropathy     CKD (chronic kidney disease) stage 3, GFR 30-59 ml/min (H)     SOB (shortness of breath)     Asbestos exposure     Snores     Past Surgical History:   Procedure Laterality Date     APPENDECTOMY       COLONOSCOPY WITH CO2 INSUFFLATION N/A 2018    Procedure: COLONOSCOPY WITH CO2 INSUFFLATION;  Surgeon: Duane, William Charles, MD;  Location: [facility] OR     ORTHOPEDIC SURGERY Right     Scar over dorsum aspect of right wrist.      ORTHOPEDIC SURGERY Right     Knee     ORTHOPEDIC SURGERY Bilateral     CTS release     SURGICAL HISTORY OF -       Umbilical Hernia     SURGICAL HISTORY OF -       Coronary Stent       Social History     Tobacco Use     Smoking status: Former Smoker     Last attempt to quit: 1983     Years since quittin.2     Smokeless tobacco: Never Used   Substance Use Topics     Alcohol use: No      Alcohol/week: 0.0 oz     Comment: Quit 25 years ago (2013)     History reviewed. No pertinent family history.      Current Outpatient Medications   Medication Sig Dispense Refill     aspirin 81 MG tablet Take 1 tablet by mouth daily.       atorvastatin (LIPITOR) 40 MG tablet Take 1 tablet (40 mg) by mouth daily 90 tablet 3     buPROPion (WELLBUTRIN XL) 300 MG 24 hr tablet Take 1 tablet (300 mg) by mouth every morning 90 tablet 1     gabapentin (NEURONTIN) 400 MG capsule Take 1 capsule (400 mg) by mouth 3 times daily 270 capsule 1     hydrochlorothiazide (HYDRODIURIL) 25 MG tablet TAKE 1 TABLET BY MOUTH  DAILY 90 tablet 1     losartan (COZAAR) 50 MG tablet TAKE 1 TABLET BY MOUTH  DAILY 90 tablet 1     metoprolol tartrate (LOPRESSOR) 25 MG tablet TAKE 1 TABLET BY MOUTH TWO  TIMES DAILY 180 tablet 1     ticagrelor (BRILINTA) 60 MG tablet Take 60 mg by mouth 2 times daily       nitroglycerin (NITROSTAT) 0.4 MG sublingual tablet Place 1 tablet (0.4 mg) under the tongue every 5 minutes as needed for chest pain (may repeat x2) (Patient not taking: Reported on 6/26/2019) 10 tablet 3     Allergies   Allergen Reactions     Sulfa Drugs      Recent Labs   Lab Test 06/20/19  0924 01/05/19  10/12/18  0932  10/05/17  0920  03/31/16  1021  10/08/13  0920 04/25/13  1004   LDL 44  --   --  60  --  47   < >  --    < > 57 136*   HDL 35*  --   --  37*  --  51   < >  --    < > 32* 35*   TRIG 233*  --   --  141  --  78   < >  --    < > 262* 203*   ALT  --   --   --   --   --   --   --  16  --  29 30   CR 1.36* 1.31*   < > 1.60*   < > 1.39*   < > 1.09   < > 0.98 1.01   GFRESTIMATED 51* 54*   < > 43*   < > 50*   < > 67   < > 76 73   GFRESTBLACK 59* >60   < > 51*   < > 61   < > 81   < > >90 89   POTASSIUM 4.0 4.0   < > 4.4   < > 4.4   < > 3.9   < > 3.9 3.9   TSH  --   --   --   --   --   --   --   --   --   --  2.57    < > = values in this interval not displayed.      BP Readings from Last 3 Encounters:   06/26/19 135/76   12/18/18  "128/74   12/14/18 130/79    Wt Readings from Last 3 Encounters:   06/26/19 115.7 kg (255 lb)   12/18/18 112.5 kg (248 lb)   10/23/18 114.3 kg (252 lb)         Reviewed and updated as needed this visit by Provider  Tobacco  Allergies  Meds  Problems  Med Hx  Surg Hx  Fam Hx         Review of Systems   ROS COMP: Constitutional, HEENT, cardiovascular, pulmonary, gi and gu systems are negative, except as otherwise noted.      Objective    /76   Pulse 83   Temp 98.1  F (36.7  C) (Oral)   Resp 16   Ht 1.765 m (5' 9.5\")   Wt 115.7 kg (255 lb)   SpO2 96%   BMI 37.12 kg/m    Body mass index is 37.12 kg/m .  Physical Exam   GENERAL: healthy, alert and no distress  EYES: Eyes grossly normal to inspection, PERRL and conjunctivae and sclerae normal  HENT: ear canals and TM's normal, nose and mouth without ulcers or lesions  NECK: no adenopathy, no asymmetry, masses, or scars and thyroid normal to palpation  RESP: lungs clear to auscultation - no rales, rhonchi or wheezes  CV: regular rate and rhythm, normal S1 S2, no S3 or S4, no murmur, click or rub, no peripheral edema and peripheral pulses strong  ABDOMEN: soft, nontender, no hepatosplenomegaly, no masses and bowel sounds normal  MS: no gross musculoskeletal defects noted, no edema  SKIN: no suspicious lesions or rashes  NEURO: Normal strength and tone, mentation intact and speech normal  PSYCH: mentation appears normal, affect normal/bright    Diagnostic Test Results:  Labs reviewed in Epic  Results for orders placed or performed in visit on 06/20/19   Lipid panel reflex to direct LDL Fasting   Result Value Ref Range    Cholesterol 126 <200 mg/dL    Triglycerides 233 (H) <150 mg/dL    HDL Cholesterol 35 (L) >39 mg/dL    LDL Cholesterol Calculated 44 <100 mg/dL    Non HDL Cholesterol 91 <130 mg/dL   **Basic metabolic panel FUTURE anytime   Result Value Ref Range    Sodium 137 133 - 144 mmol/L    Potassium 4.0 3.4 - 5.3 mmol/L    Chloride 104 94 - 109 " "mmol/L    Carbon Dioxide 24 20 - 32 mmol/L    Anion Gap 9 3 - 14 mmol/L    Glucose 102 (H) 70 - 99 mg/dL    Urea Nitrogen 28 7 - 30 mg/dL    Creatinine 1.36 (H) 0.66 - 1.25 mg/dL    GFR Estimate 51 (L) >60 mL/min/[1.73_m2]    GFR Estimate If Black 59 (L) >60 mL/min/[1.73_m2]    Calcium 8.8 8.5 - 10.1 mg/dL   PSA, screen   Result Value Ref Range    PSA 2.20 0 - 4 ug/L           Assessment & Plan       ICD-10-CM    1. Major depressive disorder, recurrent episode, mild (H) F33.0 buPROPion (WELLBUTRIN XL) 300 MG 24 hr tablet   2. Hypertension goal BP (blood pressure) < 140/90 I10 losartan (COZAAR) 50 MG tablet     metoprolol tartrate (LOPRESSOR) 25 MG tablet   3. Coronary artery disease involving native heart with angina pectoris, unspecified vessel or lesion type (H) I25.119    4. Idiopathic peripheral neuropathy G60.9 gabapentin (NEURONTIN) 400 MG capsule   5. SOB (shortness of breath) R06.02 PULMONARY MEDICINE REFERRAL     SLEEP EVALUATION & MANAGEMENT REFERRAL - [facility]  [phone] (Age 15 and up)   6. Asbestos exposure Z77.090 PULMONARY MEDICINE REFERRAL   7. Snores R06.83 SLEEP EVALUATION & MANAGEMENT REFERRAL - [facility]  [phone] (Age 15 and up)   8. CKD (chronic kidney disease) stage 3, GFR 30-59 ml/min (H) N18.3    meds refilled  Ok to increased wellbutin to 300mg. warning signs discussed. warning signs discussed.  Will have him get sleep study and follow up  With pulmonology for 2nd opinion.   warning signs discussed.    Recheck 6 months or sooner if needed.     BMI:   Estimated body mass index is 37.12 kg/m  as calculated from the following:    Height as of this encounter: 1.765 m (5' 9.5\").    Weight as of this encounter: 115.7 kg (255 lb).   Weight management plan: Discussed healthy diet and exercise guidelines      Return in about 6 months (around 12/26/2019) for recheck.    Jesse Edgar PA-C  [facility] " [place]

## 2019-06-27 ASSESSMENT — ANXIETY QUESTIONNAIRES: GAD7 TOTAL SCORE: 0

## 2019-07-03 ENCOUNTER — PRE VISIT (OUTPATIENT)
Dept: PULMONOLOGY | Facility: CLINIC | Age: 74
End: 2019-07-03

## 2019-07-03 DIAGNOSIS — Z77.090 ASBESTOS EXPOSURE: Primary | ICD-10-CM

## 2019-07-03 DIAGNOSIS — R06.02 SOB (SHORTNESS OF BREATH): ICD-10-CM

## 2019-07-03 NOTE — TELEPHONE ENCOUNTER
Pulmonology Previsit    Diagnosis: Asbestos exposure / SOB  Referred by: Jesse Edgar PA-C. [facility]    Pulmonary Function Testing:  Type: Complete PFT d/t asbestos exposure  Date: Monday 07/15/19 @ 230pm    Imaging: N/A  Type: N/A  Date: N/A    PREVISIT INFORMATION                                                    Sylvie Harvey scheduled for future visit at [facility] specialty clinics.    Patient is scheduled to see Dr. Perlman on Thursday 07/18/2019  Reason for visit: Asbestos exposure  Referring provider Jesse Edgar PA-C  Has patient seen previous specialist? No  Medical Records:  Available in chart.  Patient was previously seen at a [facility] or [facility] facility.    REVIEW                                                      New patient packet mailed to patient: N/A  Medication reconciliation complete: Yes      Current Outpatient Medications   Medication Sig Dispense Refill     aspirin 81 MG tablet Take 1 tablet by mouth daily.       atorvastatin (LIPITOR) 40 MG tablet Take 1 tablet (40 mg) by mouth daily 90 tablet 3     buPROPion (WELLBUTRIN XL) 300 MG 24 hr tablet Take 1 tablet (300 mg) by mouth every morning 90 tablet 1     gabapentin (NEURONTIN) 400 MG capsule Take 1 capsule (400 mg) by mouth 3 times daily 270 capsule 1     hydrochlorothiazide (HYDRODIURIL) 25 MG tablet TAKE 1 TABLET BY MOUTH  DAILY 90 tablet 1     losartan (COZAAR) 50 MG tablet TAKE 1 TABLET BY MOUTH  DAILY 90 tablet 1     metoprolol tartrate (LOPRESSOR) 25 MG tablet TAKE 1 TABLET BY MOUTH TWO  TIMES DAILY 180 tablet 1     nitroglycerin (NITROSTAT) 0.4 MG sublingual tablet Place 1 tablet (0.4 mg) under the tongue every 5 minutes as needed for chest pain (may repeat x2) (Patient not taking: Reported on 6/26/2019) 10 tablet 3     ticagrelor (BRILINTA) 60 MG tablet Take 60 mg by mouth 2 times daily         Allergies: Sulfa drugs        PLAN/FOLLOW-UP NEEDED                                                       Previsit review complete.  Patient will see provider at future scheduled appointment.     Patient Reminders Given:  Please, make sure you bring an updated list of your medications.   If you are having a procedure, please, present 15 minutes early.  If you need to cancel or reschedule,please call [phone].    Grace Bourne

## 2019-07-15 ENCOUNTER — OFFICE VISIT (OUTPATIENT)
Dept: NURSING | Facility: CLINIC | Age: 74
End: 2019-07-15
Payer: COMMERCIAL

## 2019-07-15 DIAGNOSIS — R06.02 SOB (SHORTNESS OF BREATH): ICD-10-CM

## 2019-07-15 DIAGNOSIS — Z77.090 ASBESTOS EXPOSURE: ICD-10-CM

## 2019-07-15 PROCEDURE — 94375 RESPIRATORY FLOW VOLUME LOOP: CPT | Performed by: INTERNAL MEDICINE

## 2019-07-15 PROCEDURE — 94729 DIFFUSING CAPACITY: CPT | Performed by: INTERNAL MEDICINE

## 2019-07-15 PROCEDURE — 94726 PLETHYSMOGRAPHY LUNG VOLUMES: CPT | Performed by: INTERNAL MEDICINE

## 2019-07-18 ENCOUNTER — OFFICE VISIT (OUTPATIENT)
Dept: PULMONOLOGY | Facility: CLINIC | Age: 74
End: 2019-07-18
Attending: PHYSICIAN ASSISTANT
Payer: COMMERCIAL

## 2019-07-18 VITALS
TEMPERATURE: 98 F | OXYGEN SATURATION: 93 % | RESPIRATION RATE: 18 BRPM | BODY MASS INDEX: 35.98 KG/M2 | WEIGHT: 251.3 LBS | HEIGHT: 70 IN | HEART RATE: 63 BPM | DIASTOLIC BLOOD PRESSURE: 72 MMHG | SYSTOLIC BLOOD PRESSURE: 137 MMHG

## 2019-07-18 DIAGNOSIS — R06.09 DYSPNEA ON EXERTION: Primary | ICD-10-CM

## 2019-07-18 DIAGNOSIS — E66.01 MORBID OBESITY (H): ICD-10-CM

## 2019-07-18 LAB
DLCOUNC-%PRED-PRE: 66 %
DLCOUNC-PRE: 16.28 ML/MIN/MMHG
DLCOUNC-PRED: 24.52 ML/MIN/MMHG
ERV-%PRED-PRE: 46 %
ERV-PRE: 0.2 L
ERV-PRED: 0.43 L
EXPTIME-PRE: 6.95 SEC
FEF2575-%PRED-PRE: 120 %
FEF2575-PRE: 2.66 L/SEC
FEF2575-PRED: 2.22 L/SEC
FEFMAX-%PRED-PRE: 69 %
FEFMAX-PRE: 5.36 L/SEC
FEFMAX-PRED: 7.72 L/SEC
FEV1-%PRED-PRE: 89 %
FEV1-PRE: 2.67 L
FEV1FEV6-PRE: 80 %
FEV1FEV6-PRED: 77 %
FEV1FVC-PRE: 80 %
FEV1FVC-PRED: 76 %
FEV1SVC-PRE: 81 %
FEV1SVC-PRED: 66 %
FIFMAX-PRE: 3.31 L/SEC
FRCPLETH-%PRED-PRE: 71 %
FRCPLETH-PRE: 2.65 L
FRCPLETH-PRED: 3.68 L
FVC-%PRED-PRE: 84 %
FVC-PRE: 3.34 L
FVC-PRED: 3.96 L
IC-%PRED-PRE: 76 %
IC-PRE: 3.09 L
IC-PRED: 4.06 L
RVPLETH-%PRED-PRE: 90 %
RVPLETH-PRE: 2.45 L
RVPLETH-PRED: 2.69 L
TLCPLETH-%PRED-PRE: 82 %
TLCPLETH-PRE: 5.74 L
TLCPLETH-PRED: 6.92 L
VA-%PRED-PRE: 83 %
VA-PRE: 5.11 L
VC-%PRED-PRE: 73 %
VC-PRE: 3.29 L
VC-PRED: 4.49 L

## 2019-07-18 PROCEDURE — 99204 OFFICE O/P NEW MOD 45 MIN: CPT | Performed by: INTERNAL MEDICINE

## 2019-07-18 RX ORDER — LORAZEPAM 1 MG/1
1 TABLET ORAL ONCE
Qty: 1 TABLET | Refills: 0 | Status: SHIPPED | OUTPATIENT
Start: 2019-07-18 | End: 2019-10-10

## 2019-07-18 ASSESSMENT — PAIN SCALES - GENERAL: PAINLEVEL: NO PAIN (0)

## 2019-07-18 ASSESSMENT — MIFFLIN-ST. JEOR: SCORE: 1878.2

## 2019-07-18 NOTE — NURSING NOTE
"Sylvie Harvey's goals for this visit include: Consult  He requests these members of his care team be copied on today's visit information: PCP    PCP: Jesse Edgar    Referring Provider:  Jesse Edgar PA-C  [address] [place] [address]    /72   Pulse 63   Temp 98  F (36.7  C)   Resp 18   Ht 1.765 m (5' 9.5\")   Wt 114 kg (251 lb 4.8 oz)   SpO2 93%   BMI 36.58 kg/m      Do you need any medication refills at today's visit? N    "

## 2019-07-18 NOTE — PROGRESS NOTES
Reason for Visit  Sylvie Harvey is a 74 year old year old male who is being seen for Consult (asbestos exposure / SOB)    ILD HPI    Sylvie Harvey is a 74-year-old male who is seen today for evaluation of dyspnea.  The patient has 2 main complaints today.  He notes dyspnea on exertion notable with stairs and other significant exertion.  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  He also describes shortness of breath or gasping for air at night when he is trying to sleep.  He states that if he gets to sleep he is able to sleep his usual 4 to 5 hours.  However he often finds himself unable to get to sleep and waking up gasping for air multiple times at night.  He does state that he feels tired during the day he works a morning shift so he gets up around 3:30 in the morning works a 5-9 shift and then typically comes home and takes 1 to 1 and half hour nap.  Then he typically goes to bed at 9:30 at night.  He has a rare dry cough.    Past medical history: Notable for coronary artery disease with stenting done in 2013 and hyperlipidemia on lifelong antiplatelet therapy and cholesterol-lowering therapy.  He has peripheral neuropathy in his legs which the etiology is unclear he also has a history of chronic kidney disease.    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  He does feel like he was exposed to asbestos there there was a lot of asbestos wrapping around the steam pipes and this was often disrupted.  He was not however involved in any type of abatement activities.  Currently the patient works at [employer].  He lives in a house and come [place] with his wife and one dog.  Has a history of smoking about 2 packs a day for 24 years quit 36 years ago.  Also has a history of alcohol abuse however he quit drinking over 20 years ago.    Family history is reviewed with the patient.  Notable for mother with rheumatoid  arthritis otherwise there is no other known lung or autoimmune disease in his family.      Current Outpatient Medications   Medication     aspirin 81 MG tablet     atorvastatin (LIPITOR) 40 MG tablet     buPROPion (WELLBUTRIN XL) 300 MG 24 hr tablet     gabapentin (NEURONTIN) 400 MG capsule     hydrochlorothiazide (HYDRODIURIL) 25 MG tablet     LORazepam (ATIVAN) 1 MG tablet     losartan (COZAAR) 50 MG tablet     metoprolol tartrate (LOPRESSOR) 25 MG tablet     nitroglycerin (NITROSTAT) 0.4 MG sublingual tablet     ticagrelor (BRILINTA) 60 MG tablet     No current facility-administered medications for this visit.      Allergies   Allergen Reactions     Sulfa Drugs      Past Medical History:   Diagnosis Date     Alcohol abuse     Quit over 25 yrs ago     Arthritis      CKD (chronic kidney disease) stage 3, GFR 30-59 ml/min (H) 10/23/2018     Coronary artery disease involving native heart with angina pectoris, unspecified vessel or lesion type (H) 4/1/2016     Drug abuse (H)     Quit over 25 yrs ago     Headaches      Hyperlipidemia LDL goal <130 4/26/2013     Hypertension      Idiopathic peripheral neuropathy 10/23/2018     Major depressive disorder, recurrent episode, mild (H) 10/18/2016       Past Surgical History:   Procedure Laterality Date     APPENDECTOMY       COLONOSCOPY WITH CO2 INSUFFLATION N/A 12/14/2018    Procedure: COLONOSCOPY WITH CO2 INSUFFLATION;  Surgeon: Duane, William Charles, MD;  Location: [facility] OR     ORTHOPEDIC SURGERY Right     Scar over dorsum aspect of right wrist.      ORTHOPEDIC SURGERY Right     Knee     ORTHOPEDIC SURGERY Bilateral     CTS release     SURGICAL HISTORY OF -       Umbilical Hernia     SURGICAL HISTORY OF -   2013    Coronary Stent       Social History     Socioeconomic History     Marital status:      Spouse name: Jenna     Number of children: 4     Years of education: 12     Highest education level: Not on file   Occupational History     Occupation: [email or web address]      "Employer: OTHER     Comment: Part-time [employer]   Social Needs     Financial resource strain: Not on file     Food insecurity:     Worry: Not on file     Inability: Not on file     Transportation needs:     Medical: Not on file     Non-medical: Not on file   Tobacco Use     Smoking status: Former Smoker     Last attempt to quit: 1983     Years since quittin.2     Smokeless tobacco: Never Used   Substance and Sexual Activity     Alcohol use: No     Alcohol/week: 0.0 oz     Comment: Quit 25 years ago (2013)     Drug use: No     Sexual activity: Yes     Partners: Female   Lifestyle     Physical activity:     Days per week: Not on file     Minutes per session: Not on file     Stress: Not on file   Relationships     Social connections:     Talks on phone: Not on file     Gets together: Not on file     Attends Sikhism service: Not on file     Active member of club or organization: Not on file     Attends meetings of clubs or organizations: Not on file     Relationship status: Not on file     Intimate partner violence:     Fear of current or ex partner: Not on file     Emotionally abused: Not on file     Physically abused: Not on file     Forced sexual activity: Not on file   Other Topics Concern     Parent/sibling w/ CABG, MI or angioplasty before 65F 55M? Not Asked   Social History Narrative     Not on file       Family History   Problem Relation Age of Onset     Rheumatoid Arthritis Mother      LUNG DISEASE No family hx of        ROS Pulmonary    A complete ROS was otherwise negative except as noted in the HPI.  Vitals:    19 0752   BP: 137/72   Pulse: 63   Resp: 18   Temp: 98  F (36.7  C)   SpO2: 93%   Weight: 114 kg (251 lb 4.8 oz)   Height: 1.765 m (5' 9.5\")     Exam:   GENERAL APPEARANCE: Well developed, obese, alert, and in no apparent distress.  EYES: PERRL, EOMI  HENT: nasal mucosa with out hyperemia or edema, no nasal polyps. TMs occluded by cerumen  MOUTH: Oral mucosa is moist, without any " lesions, no tonsillar enlargement, no oropharyngeal exudate.  NECK: supple, no masses, no thyromegaly.  LYMPHATICS: No significant axillary, cervical, or supraclavicular nodes.  RESP: good air flow throughout, - fine bibasilar inspiratory crackles  CV: Normal S1, S2, regular rhythm, normal rate, no rub, no murmur,  no gallop, no LE edema.   ABDOMEN:  Bowel sounds normal, soft, nontender, no HSM or masses.   MS: extremities normal- no clubbing, no cyanosis.  SKIN: no rash on limited exam  NEURO: Mentation intact, speech normal, normal strength and tone, normal gait and stance  PSYCH: mentation appears normal. and affect normal/bright  Results: I have reviewed all imaging, PFTs and other relavent tests, please see below for details, PFT and imaging results were reviewed with the patient.  PFTs: Normal spirometry and lung volumes.  Diffusing capacity is mildly reduced.    Assessment and plan:    74-year-old male with dyspnea on exertion and issues sleeping at night.    Problem #1 possible sleep disordered breathing: Certainly the patient's story is somewhat concerning for obstructive sleep apnea he does seem to have some excessive fatigue during the day and certainly seems to be at risk with his significant obesity.  Therefore we will refer him to the sleep clinic as I think he really needs a sleep study.    Problem #2 abnormal PFTs possible ILD.  The patient's does have dyspnea on exertion he has low normal lung volumes and a mildly reduced diffusing capacity on his pulmonary function tests.  He does have a history of asbestos exposure and certainly could have some pulmonary fibrosis from that.  Therefore I am going to get a high-resolution CT scan of the chest.  If that does show any ILD will need to work that up as well.  I do not see that the patient has had an echocardiogram recently so this may need to be performed at some point as well however I think it would make sense to get his sleep study done first.  I  will plan to see the patient back in 2 months after he has had a sleep study and will get repeat pulmonary function test and a 6-minute walk test at that time.  I will follow-up on the CT scan in the interim if there is any further work-up that is needed prior to that we will initiate that.      CBC   Recent Labs   Lab Test 04/25/13  1004   RBC 4.85   HGB 15.0   HCT 44.9          Basic Metabolic Panel  Recent Labs   Lab Test 06/20/19  0924 01/05/19 12/07/18  0934     --  137   POTASSIUM 4.0 4.0 4.0   CHLORIDE 104  --  103   CO2 24  --  25   BUN 28  --  26   * 98 94   GABE 8.8  --  9.0       INR  No results for input(s): INR in the last 72750 hours.    PFT  PFT Latest Ref Rng & Units 7/15/2019   FVC L 3.34   FEV1 L 2.67   FVC% % 84   FEV1% % 89           CC:

## 2019-07-18 NOTE — LETTER
7/18/2019        RE: Sylvie Harvey  [address] [facility] [address]        Reason for Visit  Sylvie Harvey is a 74 year old year old male who is being seen for Consult (asbestos exposure / SOB)    ILD HPI    Sylvie Harvey is a 74-year-old male who is seen today for evaluation of dyspnea.  The patient has 2 main complaints today.  He notes dyspnea on exertion notable with stairs and other significant exertion.  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  He also describes shortness of breath or gasping for air at night when he is trying to sleep.  He states that if he gets to sleep he is able to sleep his usual 4 to 5 hours.  However he often finds himself unable to get to sleep and waking up gasping for air multiple times at night.  He does state that he feels tired during the day he works a morning shift so he gets up around 3:30 in the morning works a 5-9 shift and then typically comes home and takes 1 to 1 and half hour nap.  Then he typically goes to bed at 9:30 at night.  He has a rare dry cough.    Past medical history: Notable for coronary artery disease with stenting done in 2013 and hyperlipidemia on lifelong antiplatelet therapy and cholesterol-lowering therapy.  He has peripheral neuropathy in his legs which the etiology is unclear he also has a history of chronic kidney disease.    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  He does feel like he was exposed to asbestos there there was a lot of asbestos wrapping around the steam pipes and this was often disrupted.  He was not however involved in any type of abatement activities.  Currently the patient works at [employer].  He lives in a house and come [place] with his wife and one dog.  Has a history of smoking about 2 packs a day for 24 years quit 36 years ago.  Also has a history of alcohol abuse however he quit drinking over 20 years  ago.    Family history is reviewed with the patient.  Notable for mother with rheumatoid arthritis otherwise there is no other known lung or autoimmune disease in his family.      Current Outpatient Medications   Medication     aspirin 81 MG tablet     atorvastatin (LIPITOR) 40 MG tablet     buPROPion (WELLBUTRIN XL) 300 MG 24 hr tablet     gabapentin (NEURONTIN) 400 MG capsule     hydrochlorothiazide (HYDRODIURIL) 25 MG tablet     LORazepam (ATIVAN) 1 MG tablet     losartan (COZAAR) 50 MG tablet     metoprolol tartrate (LOPRESSOR) 25 MG tablet     nitroglycerin (NITROSTAT) 0.4 MG sublingual tablet     ticagrelor (BRILINTA) 60 MG tablet     No current facility-administered medications for this visit.      Allergies   Allergen Reactions     Sulfa Drugs      Past Medical History:   Diagnosis Date     Alcohol abuse     Quit over 25 yrs ago     Arthritis      CKD (chronic kidney disease) stage 3, GFR 30-59 ml/min (H) 10/23/2018     Coronary artery disease involving native heart with angina pectoris, unspecified vessel or lesion type (H) 4/1/2016     Drug abuse (H)     Quit over 25 yrs ago     Headaches      Hyperlipidemia LDL goal <130 4/26/2013     Hypertension      Idiopathic peripheral neuropathy 10/23/2018     Major depressive disorder, recurrent episode, mild (H) 10/18/2016       Past Surgical History:   Procedure Laterality Date     APPENDECTOMY       COLONOSCOPY WITH CO2 INSUFFLATION N/A 12/14/2018    Procedure: COLONOSCOPY WITH CO2 INSUFFLATION;  Surgeon: Duane, William Charles, MD;  Location: [facility]     ORTHOPEDIC SURGERY Right     Scar over dorsum aspect of right wrist.      ORTHOPEDIC SURGERY Right     Knee     ORTHOPEDIC SURGERY Bilateral     CTS release     SURGICAL HISTORY OF -       Umbilical Hernia     SURGICAL HISTORY OF -   2013    Coronary Stent       Social History     Socioeconomic History     Marital status:      Spouse name: Jenna     Number of children: 4     Years of education: 12      "Highest education level: Not on file   Occupational History     Occupation: [employer]     Employer: OTHER     Comment: Part-time [employer]   Social Needs     Financial resource strain: Not on file     Food insecurity:     Worry: Not on file     Inability: Not on file     Transportation needs:     Medical: Not on file     Non-medical: Not on file   Tobacco Use     Smoking status: Former Smoker     Last attempt to quit: 1983     Years since quittin.2     Smokeless tobacco: Never Used   Substance and Sexual Activity     Alcohol use: No     Alcohol/week: 0.0 oz     Comment: Quit 25 years ago (2013)     Drug use: No     Sexual activity: Yes     Partners: Female   Lifestyle     Physical activity:     Days per week: Not on file     Minutes per session: Not on file     Stress: Not on file   Relationships     Social connections:     Talks on phone: Not on file     Gets together: Not on file     Attends Yarsani service: Not on file     Active member of club or organization: Not on file     Attends meetings of clubs or organizations: Not on file     Relationship status: Not on file     Intimate partner violence:     Fear of current or ex partner: Not on file     Emotionally abused: Not on file     Physically abused: Not on file     Forced sexual activity: Not on file   Other Topics Concern     Parent/sibling w/ CABG, MI or angioplasty before 65F 55M? Not Asked   Social History Narrative     Not on file       Family History   Problem Relation Age of Onset     Rheumatoid Arthritis Mother      LUNG DISEASE No family hx of        ROS Pulmonary    A complete ROS was otherwise negative except as noted in the HPI.  Vitals:    19 0752   BP: 137/72   Pulse: 63   Resp: 18   Temp: 98  F (36.7  C)   SpO2: 93%   Weight: 114 kg (251 lb 4.8 oz)   Height: 1.765 m (5' 9.5\")     Exam:   GENERAL APPEARANCE: Well developed, obese, alert, and in no apparent distress.  EYES: PERRL, EOMI  HENT: nasal mucosa with out hyperemia or edema, " no nasal polyps. TMs occluded by cerumen  MOUTH: Oral mucosa is moist, without any lesions, no tonsillar enlargement, no oropharyngeal exudate.  NECK: supple, no masses, no thyromegaly.  LYMPHATICS: No significant axillary, cervical, or supraclavicular nodes.  RESP: good air flow throughout, - fine bibasilar inspiratory crackles  CV: Normal S1, S2, regular rhythm, normal rate, no rub, no murmur,  no gallop, no LE edema.   ABDOMEN:  Bowel sounds normal, soft, nontender, no HSM or masses.   MS: extremities normal- no clubbing, no cyanosis.  SKIN: no rash on limited exam  NEURO: Mentation intact, speech normal, normal strength and tone, normal gait and stance  PSYCH: mentation appears normal. and affect normal/bright  Results: I have reviewed all imaging, PFTs and other relavent tests, please see below for details, PFT and imaging results were reviewed with the patient.  PFTs: Normal spirometry and lung volumes.  Diffusing capacity is mildly reduced.    Assessment and plan:    74-year-old male with dyspnea on exertion and issues sleeping at night.    Problem #1 possible sleep disordered breathing: Certainly the patient's story is somewhat concerning for obstructive sleep apnea he does seem to have some excessive fatigue during the day and certainly seems to be at risk with his significant obesity.  Therefore we will refer him to the sleep clinic as I think he really needs a sleep study.    Problem #2 abnormal PFTs possible ILD.  The patient's does have dyspnea on exertion he has low normal lung volumes and a mildly reduced diffusing capacity on his pulmonary function tests.  He does have a history of asbestos exposure and certainly could have some pulmonary fibrosis from that.  Therefore I am going to get a high-resolution CT scan of the chest.  If that does show any ILD will need to work that up as well.  I do not see that the patient has had an echocardiogram recently so this may need to be performed at some point as  well however I think it would make sense to get his sleep study done first.  I will plan to see the patient back in 2 months after he has had a sleep study and will get repeat pulmonary function test and a 6-minute walk test at that time.  I will follow-up on the CT scan in the interim if there is any further work-up that is needed prior to that we will initiate that.      CBC   Recent Labs   Lab Test 04/25/13  1004   RBC 4.85   HGB 15.0   HCT 44.9          Basic Metabolic Panel  Recent Labs   Lab Test 06/20/19  0924 01/05/19 12/07/18  0934     --  137   POTASSIUM 4.0 4.0 4.0   CHLORIDE 104  --  103   CO2 24  --  25   BUN 28  --  26   * 98 94   GABE 8.8  --  9.0       INR  No results for input(s): INR in the last 70790 hours.    PFT  PFT Latest Ref Rng & Units 7/15/2019   FVC L 3.34   FEV1 L 2.67   FVC% % 84   FEV1% % 89           CC:            Sincerely,        David Morris Perlman, MD

## 2019-07-23 ENCOUNTER — ANCILLARY PROCEDURE (OUTPATIENT)
Dept: CT IMAGING | Facility: CLINIC | Age: 74
End: 2019-07-23
Attending: INTERNAL MEDICINE
Payer: COMMERCIAL

## 2019-07-23 DIAGNOSIS — R06.09 DYSPNEA ON EXERTION: ICD-10-CM

## 2019-07-23 PROCEDURE — 71250 CT THORAX DX C-: CPT | Performed by: RADIOLOGY

## 2019-07-25 ENCOUNTER — PATIENT OUTREACH (OUTPATIENT)
Dept: PULMONOLOGY | Facility: CLINIC | Age: 74
End: 2019-07-25

## 2019-07-25 NOTE — PROGRESS NOTES
Per Dr. Perlman CT shows mild scarring in the lungs. It does not explain patient's current SOB but is something we will continue to monitor. In addition, patient needs to be set up with a sleep study prior to his return appointment.     I spoke with patient, he is set up for his sleep study in August. I scheduled patient for PFT testing and 6MWT on September 19.     Patient denies questions at this time.     Jonathan Monet RN   Pulmonary/CORE Care Coordinator  [facility]

## 2019-08-20 ENCOUNTER — OFFICE VISIT (OUTPATIENT)
Dept: SLEEP MEDICINE | Facility: CLINIC | Age: 74
End: 2019-08-20
Attending: PHYSICIAN ASSISTANT
Payer: COMMERCIAL

## 2019-08-20 VITALS
OXYGEN SATURATION: 93 % | SYSTOLIC BLOOD PRESSURE: 132 MMHG | DIASTOLIC BLOOD PRESSURE: 76 MMHG | WEIGHT: 253 LBS | HEIGHT: 71 IN | BODY MASS INDEX: 35.42 KG/M2 | HEART RATE: 74 BPM

## 2019-08-20 DIAGNOSIS — G47.10 EXCESSIVE SLEEPINESS: ICD-10-CM

## 2019-08-20 DIAGNOSIS — R06.83 SNORES: ICD-10-CM

## 2019-08-20 DIAGNOSIS — R06.81 WITNESSED EPISODE OF APNEA: Primary | ICD-10-CM

## 2019-08-20 DIAGNOSIS — E66.01 MORBID OBESITY (H): ICD-10-CM

## 2019-08-20 PROCEDURE — 99204 OFFICE O/P NEW MOD 45 MIN: CPT | Performed by: INTERNAL MEDICINE

## 2019-08-20 ASSESSMENT — MIFFLIN-ST. JEOR: SCORE: 1901.79

## 2019-08-20 NOTE — PATIENT INSTRUCTIONS
Your BMI is Body mass index is 35.79 kg/m .  Weight management is a personal decision.  If you are interested in exploring weight loss strategies, the following discussion covers the approaches that may be successful. Body mass index (BMI) is one way to tell whether you are at a healthy weight, overweight, or obese. It measures your weight in relation to your height.  A BMI of 18.5 to 24.9 is in the healthy range. A person with a BMI of 25 to 29.9 is considered overweight, and someone with a BMI of 30 or greater is considered obese. More than two-thirds of American adults are considered overweight or obese.  Being overweight or obese increases the risk for further weight gain. Excess weight may lead to heart disease and diabetes.  Creating and following plans for healthy eating and physical activity may help you improve your health.  Weight control is part of healthy lifestyle and includes exercise, emotional health, and healthy eating habits. Careful eating habits lifelong are the mainstay of weight control. Though there are significant health benefits from weight loss, long-term weight loss with diet alone may be very difficult to achieve- studies show long-term success with dietary management in less than 10% of people. Attaining a healthy weight may be especially difficult to achieve in those with severe obesity. In some cases, medications, devices and surgical management might be considered.  What can you do?  If you are overweight or obese and are interested in methods for weight loss, you should discuss this with your provider.     Consider reducing daily calorie intake by 500 calories.     Keep a food journal.     Avoiding skipping meals, consider cutting portions instead.    Diet combined with exercise helps maintain muscle while optimizing fat loss. Strength training is particularly important for building and maintaining muscle mass. Exercise helps reduce stress, increase energy, and improves fitness.  Increasing exercise without diet control, however, may not burn enough calories to loose weight.       Start walking three days a week 10-20 minutes at a time    Work towards walking thirty minutes five days a week     Eventually, increase the speed of your walking for 1-2 minutes at time    In addition, we recommend that you review healthy lifestyles and methods for weight loss available through the National Institutes of Health patient information sites:  http://win.niddk.nih.gov/publications/index.htm    And look into health and wellness programs that may be available through your health insurance provider, employer, local community center, or farrah club.    Weight management plan: Patient was referred to their PCP to discuss a diet and exercise plan.

## 2019-08-20 NOTE — PROGRESS NOTES
Sleep Consultation:    Date on this visit: 8/20/2019    Sylvie Harvey is sent by Jesse Edgar for a sleep consultation regarding snoring and possible sleep apnea.    Primary Physician: Jesse Edgar     He has a history of CAD, CKD, obesity, depression, and asbestos exposure.    Schedule - Works part-time at [employer] (5 - 9 AM).  Typically up around 3:30 AM (alarm is set for 3:40 AM); on weekends up around 4:30 AM.  In bed around 9:30 PM.      Sleep Disordered Breathing - Snores loudly. Has witnessed apneas and snort arousals.   Has nocturia twice per night.  No nocturnal GERD.   Upon waking feels tired.  He reports morning headaches.  No morning dry mouth.  No morning sinus congestion.   Patient was counseled on the importance of driving while alert, to pull over if drowsy, or nap before getting into the vehicle if sleepy.    Movement/Behaviors - No somniloquy.  No somnambulism.  No sleep related eating.  No dream enactment behavior.   He denies bruxism.    Patient denies typical restless legs syndrome symptoms.    Alcohol use - None  Caffeine intake - occasional sodas/day. Last caffeine intake is usually during the day.  Tobacco exposure - None/Remote former  Illicit substances - None    Lives with wife.  Has 1 pet, dog.     No known family history of snoring or Obstructive Sleep Apnea.    Allergies:    Allergies   Allergen Reactions     Sulfa Drugs        Medications:    Current Outpatient Medications   Medication Sig Dispense Refill     aspirin 81 MG tablet Take 1 tablet by mouth daily.       atorvastatin (LIPITOR) 40 MG tablet Take 1 tablet (40 mg) by mouth daily 90 tablet 3     buPROPion (WELLBUTRIN XL) 300 MG 24 hr tablet Take 1 tablet (300 mg) by mouth every morning 90 tablet 1     gabapentin (NEURONTIN) 400 MG capsule Take 1 capsule (400 mg) by mouth 3 times daily 270 capsule 1     hydrochlorothiazide (HYDRODIURIL) 25 MG tablet TAKE 1 TABLET BY MOUTH  DAILY 90 tablet 1      losartan (COZAAR) 50 MG tablet TAKE 1 TABLET BY MOUTH  DAILY 90 tablet 1     metoprolol tartrate (LOPRESSOR) 25 MG tablet TAKE 1 TABLET BY MOUTH TWO  TIMES DAILY 180 tablet 1     nitroglycerin (NITROSTAT) 0.4 MG sublingual tablet Place 1 tablet (0.4 mg) under the tongue every 5 minutes as needed for chest pain (may repeat x2) 10 tablet 3     ticagrelor (BRILINTA) 60 MG tablet Take 60 mg by mouth 2 times daily         Problem List:  Patient Active Problem List    Diagnosis Date Noted     Obesity (BMI 35.0-39.9) with comorbidity (H) 07/18/2019     Priority: Medium     SOB (shortness of breath) 06/26/2019     Priority: Medium     Asbestos exposure 06/26/2019     Priority: Medium     Snores 06/26/2019     Priority: Medium     Idiopathic peripheral neuropathy 10/23/2018     Priority: Medium     CKD (chronic kidney disease) stage 3, GFR 30-59 ml/min (H) 10/23/2018     Priority: Medium     Advanced directives, counseling/discussion 04/24/2018     Priority: Medium     Advance Care Planning: Info given. TREVON Benjamin MA             Major depressive disorder, recurrent episode, mild (H) 10/18/2016     Priority: Medium     Coronary artery disease involving native heart with angina pectoris, unspecified vessel or lesion type (H) 04/01/2016     Priority: Medium     Arthritis 04/01/2016     Priority: Medium     Medial meniscus tear 02/23/2015     Priority: Medium     Plantar fasciitis 06/25/2013     Priority: Medium     Pain in shoulder 05/18/2013     Priority: Medium     Hyperlipidemia LDL goal <130 04/26/2013     Priority: Medium     Hypertension goal BP (blood pressure) < 140/90 04/25/2013     Priority: Medium        Past Medical/Surgical History:  Past Medical History:   Diagnosis Date     Alcohol abuse     Quit over 25 yrs ago     Arthritis      CKD (chronic kidney disease) stage 3, GFR 30-59 ml/min (H) 10/23/2018     Coronary artery disease involving native heart with angina pectoris, unspecified vessel or lesion type  (H) 2016     Drug abuse (H)     Quit over 25 yrs ago     Headaches      Hyperlipidemia LDL goal <130 2013     Hypertension      Idiopathic peripheral neuropathy 10/23/2018     Major depressive disorder, recurrent episode, mild (H) 10/18/2016     Past Surgical History:   Procedure Laterality Date     APPENDECTOMY       COLONOSCOPY WITH CO2 INSUFFLATION N/A 2018    Procedure: COLONOSCOPY WITH CO2 INSUFFLATION;  Surgeon: Duane, William Charles, MD;  Location: [facility] OR     ORTHOPEDIC SURGERY Right     Scar over dorsum aspect of right wrist.      ORTHOPEDIC SURGERY Right     Knee     ORTHOPEDIC SURGERY Bilateral     CTS release     SURGICAL HISTORY OF -       Umbilical Hernia     SURGICAL HISTORY OF -       Coronary Stent       Social History:  Social History     Socioeconomic History     Marital status:      Spouse name: Jenna     Number of children: 4     Years of education: 12     Highest education level: Not on file   Occupational History     Occupation: [employer]     Employer: OTHER     Comment: Part-time [employer]   Social Needs     Financial resource strain: Not on file     Food insecurity:     Worry: Not on file     Inability: Not on file     Transportation needs:     Medical: Not on file     Non-medical: Not on file   Tobacco Use     Smoking status: Former Smoker     Last attempt to quit: 1983     Years since quittin.3     Smokeless tobacco: Never Used   Substance and Sexual Activity     Alcohol use: No     Alcohol/week: 0.0 oz     Comment: Quit 25 years ago ()     Drug use: No     Sexual activity: Yes     Partners: Female   Lifestyle     Physical activity:     Days per week: Not on file     Minutes per session: Not on file     Stress: Not on file   Relationships     Social connections:     Talks on phone: Not on file     Gets together: Not on file     Attends Zoroastrian service: Not on file     Active member of club or organization: Not on file     Attends meetings of clubs or  "organizations: Not on file     Relationship status: Not on file     Intimate partner violence:     Fear of current or ex partner: Not on file     Emotionally abused: Not on file     Physically abused: Not on file     Forced sexual activity: Not on file   Other Topics Concern     Parent/sibling w/ CABG, MI or angioplasty before 65F 55M? Not Asked   Social History Narrative     Not on file       Family History:  Family History   Problem Relation Age of Onset     Rheumatoid Arthritis Mother      LUNG DISEASE No family hx of        Review of Systems:  A complete review of systems reviewed by me is negative with the exeption of what has been mentioned in the history of present illness.  Orthopnea  Headaches  Shortness of breath and wheezing  Urinary frequency  Joint or bone pain.    Physical Examination:  Vitals: /76   Pulse 74   Ht 1.791 m (5' 10.5\")   Wt 114.8 kg (253 lb)   SpO2 93%   BMI 35.79 kg/m    BMI= Body mass index is 35.79 kg/m .    Neck Cir (cm): 43 cm    [place] Ari Total Score 8/20/2019   Total score - [place] 3     MILLIE Total Score: 14 (08/20/19 0800)    General appearance: No apparent distress, well groomed.    HEENT:   Head: Normocephalic, atraumatic.  Eyes: PERRL  Nose: Nares patent.  No exudate.  No septal deviation noted.  Mouth: Teeth: Full upper and almost full lower dentures   Tongue: Normal  Oropharynx:  Mallampati Classification: II    Tonsils: Grade 0    Uvula: Normal    Neck: Supple without bruit.     Neck Cir (cm): 43 cm (8/20/2019  8:55 AM)      Cardiac: Regular rate and rhythm.  No murmurs.  Radial pulses are strong and symmetric.  Pulmonary: Symmetric air movement, lungs clear to auscultation bilaterally.  Musculoskeletal: No edema noted.  No clubbing or cyanosis.  Skin: Warm, dry, intact.  Neurologic: Alert and oriented to person, place and time   Gait is normal.  Psychiatric: Affect pleasant.  Mood good.    Impression/Plan:  1. Snores  I have a high suspicion for KIMI based on " presence of snoring, snort arousals, witnessed apneas, enlarged neck girth >= 43 cm for male, and obesity with excessive daytime sleepiness. We discussed pathophysiology of obstructive sleep apnea, testing with overnight polysomnography, and treatment modalities (CPAP only discussed at this visit). We discussed consequences of untreated Obstructive Sleep Apnea, such as markedly elevated risk for heart attack or stroke. Patient is interested in treatment and willing to undergo overnight sleep testing.    Home sleep testing is appropriate for this patient  Study has been ordered today.    - SLEEP EVALUATION & MANAGEMENT REFERRAL - [facility]  [phone] (Age 15 and up)  - HST-Home Sleep Apnea Test; Future    2. Witnessed episode of apnea  Concern for Obstructive Sleep Apnea noted above.    3. Excessive sleepiness  Discussed concern for Obstructive Sleep Apnea. Sleep timing and amounts normal for age.    4. Obesity (BMI 35.0-39.9) with comorbidity (H)  We discussed the link between obesity, sleep apnea, and health outcomes.  Patient was encouraged to decrease caloric intake and increase activity levels to try to move towards a normal weight.  He was encouraged to discuss further strategies with his primary care provider.     Literature provided regarding sleep apnea.      He will follow up with me in approximately two weeks after his sleep study has been competed to review the results and discuss plan of care.       Polysomnography reviewed.  Obstructive sleep apnea reviewed.  Complications of untreated sleep apnea were reviewed.    Marcin Morales MD, Sleep Physician  Aug 20, 2019     CC: Jesse Edgar

## 2019-08-20 NOTE — NURSING NOTE
"Chief Complaint   Patient presents with     Sleep Problem     consult- REferred by Dr. Daly       Initial /76   Pulse 74   Ht 1.791 m (5' 10.5\")   Wt 114.8 kg (253 lb)   SpO2 93%   BMI 35.79 kg/m   Estimated body mass index is 35.79 kg/m  as calculated from the following:    Height as of this encounter: 1.791 m (5' 10.5\").    Weight as of this encounter: 114.8 kg (253 lb).    Medication Reconciliation: complete    Neck circumference: 17 inches / 43 centimeters.        "

## 2019-09-09 ENCOUNTER — OFFICE VISIT (OUTPATIENT)
Dept: SLEEP MEDICINE | Facility: CLINIC | Age: 74
End: 2019-09-09
Payer: COMMERCIAL

## 2019-09-09 DIAGNOSIS — R06.83 SNORES: ICD-10-CM

## 2019-09-09 PROCEDURE — G0399 HOME SLEEP TEST/TYPE 3 PORTA: HCPCS | Performed by: INTERNAL MEDICINE

## 2019-09-10 ENCOUNTER — APPOINTMENT (OUTPATIENT)
Dept: SLEEP MEDICINE | Facility: CLINIC | Age: 74
End: 2019-09-10
Payer: COMMERCIAL

## 2019-09-10 NOTE — PROCEDURES
"HOME SLEEP STUDY INTERPRETATION    Patient: Sylvie Harvey  MRN: [id number]  YOB: 1945  Study Date: 9/9/2019  Referring Provider: Jesse Edgar;   Ordering Provider: Marcin Morales MD     Indications for Home Study: Sylvie Harvey is a 74 year old male with a history of CAD, CKD, obesity, depression, and asbestos exposure who presents with symptoms suggestive of obstructive sleep apnea.    Estimated body mass index is 35.79 kg/m  as calculated from the following:    Height as of 8/20/19: 1.791 m (5' 10.5\").    Weight as of 8/20/19: 114.8 kg (253 lb).  Total score - [place]: 3 (8/20/2019  8:00 AM)  StopBang Total Score: 8 (8/20/2019  9:00 AM)    Data: A full night home sleep study was performed recording the standard physiologic parameters including body position, movement, sound, nasal pressure, thermal oral airflow, chest and abdominal movements with respiratory inductance plethysmography, and oxygen saturation by pulse oximetry. Pulse rate was estimated by oximetry recording. This study was considered adequate based on > 4 hours of quality oximetry and respiratory recording. As specified by the AASM Manual for the Scoring of Sleep and Associated events, version 2.3, Rule VIII.D 1B, 4% oxygen desaturation scoring for hypopneas is used as a standard of care on all home sleep apnea testing.    Analysis Time:  364 minutes    Respiration:   Sleep Associated Hypoxemia: sustained hypoxemia was present. Baseline oxygen saturation was 92.3%.  Time with saturation less than or equal to 88% was 19.7 minutes. The lowest oxygen saturation was 83%.   Snoring: Snoring was present.  Respiratory events: The home study revealed a presence of 40 obstructive apneas and 6 mixed and central apneas. There were 80 hypopneas resulting in a combined apnea/hypopnea index [AHI] of 20.8 events per hour.  AHI was 73.9 per hour supine, - per hour prone, 34.1 per hour on left side, and 14.6 per hour on right " side.   Pattern: Excluding events noted above, respiratory rate and pattern was Normal.    Position: Percent of time spent: supine - 9.6%, prone - 0%, on left - 3.9%, on right - 84.8%.    Heart Rate: By pulse oximetry normal rate was noted.     Assessment:   Moderate obstructive sleep apnea.  Sleep associated hypoxemia was present.    Recommendations:  Consider auto-CPAP at 6-15 cmH2O, oral appliance therapy, polysomnography with full night PAP titration or surgical options.  Suggest optimizing sleep hygiene and avoiding sleep deprivation.  Weight management.    Diagnosis Code(s): Obstructive Sleep Apnea G47.33, Hypoxemia G47.36    Marcin Morales MD, September 10, 2019   Diplomate, American Board of Internal Medicine, Sleep Medicine

## 2019-09-10 NOTE — PROGRESS NOTES
This HSAT was performed using a Noxturnal T3 device which recorded snore, sound, movement activity, body position, nasal pressure, oronasal thermal airflow, pulse, oximetry and both chest and abdominal respiratory effort. HSAT data was restricted to the time patient states they were in bed.     HSAT was scored using 1B 4% hypopnea rule.     HST AHI (Non-PAT): 20.8  Snoring was reported as intermittent.  Time with SpO2 below 89% was 19.7 minutes.   Overall signal quality was fair     Pt will follow up with sleep provider to determine appropriate therapy.

## 2019-09-10 NOTE — PROGRESS NOTES
Pt returned HST device. It was downloaded and forwarded data to the clinical specialist for scoring.    Lara Perez MA on 9/10/2019 at 11:20 AM

## 2019-09-17 ENCOUNTER — OFFICE VISIT (OUTPATIENT)
Dept: SLEEP MEDICINE | Facility: CLINIC | Age: 74
End: 2019-09-17
Payer: COMMERCIAL

## 2019-09-17 VITALS
OXYGEN SATURATION: 94 % | SYSTOLIC BLOOD PRESSURE: 127 MMHG | HEART RATE: 81 BPM | WEIGHT: 254 LBS | BODY MASS INDEX: 36.36 KG/M2 | HEIGHT: 70 IN | DIASTOLIC BLOOD PRESSURE: 77 MMHG

## 2019-09-17 DIAGNOSIS — G47.33 OSA (OBSTRUCTIVE SLEEP APNEA): ICD-10-CM

## 2019-09-17 PROCEDURE — 99213 OFFICE O/P EST LOW 20 MIN: CPT | Performed by: INTERNAL MEDICINE

## 2019-09-17 ASSESSMENT — MIFFLIN-ST. JEOR: SCORE: 1898.39

## 2019-09-17 NOTE — PATIENT INSTRUCTIONS
Your BMI is Body mass index is 36.45 kg/m .  Weight management is a personal decision.  If you are interested in exploring weight loss strategies, the following discussion covers the approaches that may be successful. Body mass index (BMI) is one way to tell whether you are at a healthy weight, overweight, or obese. It measures your weight in relation to your height.  A BMI of 18.5 to 24.9 is in the healthy range. A person with a BMI of 25 to 29.9 is considered overweight, and someone with a BMI of 30 or greater is considered obese. More than two-thirds of American adults are considered overweight or obese.  Being overweight or obese increases the risk for further weight gain. Excess weight may lead to heart disease and diabetes.  Creating and following plans for healthy eating and physical activity may help you improve your health.  Weight control is part of healthy lifestyle and includes exercise, emotional health, and healthy eating habits. Careful eating habits lifelong are the mainstay of weight control. Though there are significant health benefits from weight loss, long-term weight loss with diet alone may be very difficult to achieve- studies show long-term success with dietary management in less than 10% of people. Attaining a healthy weight may be especially difficult to achieve in those with severe obesity. In some cases, medications, devices and surgical management might be considered.  What can you do?  If you are overweight or obese and are interested in methods for weight loss, you should discuss this with your provider.     Consider reducing daily calorie intake by 500 calories.     Keep a food journal.     Avoiding skipping meals, consider cutting portions instead.    Diet combined with exercise helps maintain muscle while optimizing fat loss. Strength training is particularly important for building and maintaining muscle mass. Exercise helps reduce stress, increase energy, and improves fitness.  Increasing exercise without diet control, however, may not burn enough calories to loose weight.       Start walking three days a week 10-20 minutes at a time    Work towards walking thirty minutes five days a week     Eventually, increase the speed of your walking for 1-2 minutes at time    In addition, we recommend that you review healthy lifestyles and methods for weight loss available through the National Institutes of Health patient information sites:  http://win.niddk.nih.gov/publications/index.htm    And look into health and wellness programs that may be available through your health insurance provider, employer, local community center, or farrah club.    Weight management plan: Patient was referred to their PCP to discuss a diet and exercise plan.

## 2019-09-17 NOTE — PROGRESS NOTES
"Home Sleep Apnea Testing Results Visit:    Chief Complaint   Patient presents with     Study Results     HST results       Sylvie Harvey is a 74 year old male who returns to [facility] after having had Home Sleep Apnea Testing.  He presented with symptoms suggestive of obstructive sleep apnea.    Estimated body mass index is 36.45 kg/m  as calculated from the following:    Height as of this encounter: 1.778 m (5' 10\").    Weight as of this encounter: 115.2 kg (254 lb).  Total score - [place]: 6 (9/17/2019 11:00 AM)   StopBang Total Score: 8 (8/20/2019  9:00 AM)  MLILIE Total Score: 11    Home Sleep Apnea Testing - 9/16/19: 254 lbs 0 oz: AHI 20.8/hr. Supine AHI 73.9/hr.   Oxygen Steven of 83%.  Baseline 92.3%.  Sp02 =< 88% for 19.7 minutes  He slept on his back (9.6%), prone (0%), left (03.9) and right (84.8%) sides.   Analysis time: 364 minutes.     Signal quality of Oxymeter 84% Poor  Nasal Cannula 100% Good  RIP belts 100% Good.     Sylvie Harvey reports that he slept Poor. Had a lot of trouble falling asleep and took 2 hours to get to sleep. Woke up several times due to equipment or need to urinate.      Home Sleep Apnea Testing was reviewed in detail today with Sylvie and a copy given to him for his records.    Past medical/surgical history, family history, social history, medications and allergies were reviewed.      /77   Pulse 81   Ht 1.778 m (5' 10\")   Wt 115.2 kg (254 lb)   SpO2 94%   BMI 36.45 kg/m      Impression/Plan:  Moderate Obstructive Sleep Apnea.   Sleep associated hypoxemia was present.     Treatment options discussed today including  auto-CPAP at 6-15 cmH2O, oral appliance therapy, polysomnography with full night PAP titration or surgical options.    Elected treatment with auto-CPAP at 6-15 cmH2O.    15 minutes spent with patient with >50% spent in counseling and coordination of care.      CC:  Jesse Edgar,         "

## 2019-09-17 NOTE — NURSING NOTE
"Chief Complaint   Patient presents with     Study Results     HST results       Initial BP (!) 144/83   Pulse 81   Ht 1.778 m (5' 10\")   Wt 115.2 kg (254 lb)   SpO2 94%   BMI 36.45 kg/m   Estimated body mass index is 36.45 kg/m  as calculated from the following:    Height as of this encounter: 1.778 m (5' 10\").    Weight as of this encounter: 115.2 kg (254 lb).    Medication Reconciliation: complete      "

## 2019-09-19 ENCOUNTER — OFFICE VISIT (OUTPATIENT)
Dept: NURSING | Facility: CLINIC | Age: 74
End: 2019-09-19
Payer: COMMERCIAL

## 2019-09-19 ENCOUNTER — TELEPHONE (OUTPATIENT)
Dept: SLEEP MEDICINE | Facility: CLINIC | Age: 74
End: 2019-09-19

## 2019-09-19 DIAGNOSIS — R06.09 DYSPNEA ON EXERTION: ICD-10-CM

## 2019-09-19 DIAGNOSIS — G47.33 OSA (OBSTRUCTIVE SLEEP APNEA): ICD-10-CM

## 2019-09-19 LAB
6 MIN WALK (FT): 1340 FT
6 MIN WALK (M): 408 M

## 2019-09-19 PROCEDURE — 94618 PULMONARY STRESS TESTING: CPT | Performed by: INTERNAL MEDICINE

## 2019-09-19 PROCEDURE — 94375 RESPIRATORY FLOW VOLUME LOOP: CPT | Performed by: INTERNAL MEDICINE

## 2019-09-19 PROCEDURE — 94729 DIFFUSING CAPACITY: CPT | Performed by: INTERNAL MEDICINE

## 2019-09-23 LAB
DLCOUNC-%PRED-PRE: 68 %
DLCOUNC-PRE: 16.86 ML/MIN/MMHG
DLCOUNC-PRED: 24.52 ML/MIN/MMHG
ERV-%PRED-PRE: 96 %
ERV-PRE: 0.42 L
ERV-PRED: 0.44 L
EXPTIME-PRE: 6.97 SEC
FEF2575-%PRED-PRE: 127 %
FEF2575-PRE: 2.82 L/SEC
FEF2575-PRED: 2.22 L/SEC
FEFMAX-%PRED-PRE: 75 %
FEFMAX-PRE: 5.79 L/SEC
FEFMAX-PRED: 7.72 L/SEC
FEV1-%PRED-PRE: 91 %
FEV1-PRE: 2.73 L
FEV1FEV6-PRE: 81 %
FEV1FEV6-PRED: 77 %
FEV1FVC-PRE: 81 %
FEV1FVC-PRED: 76 %
FEV1SVC-PRE: 94 %
FEV1SVC-PRED: 66 %
FIFMAX-PRE: 3.67 L/SEC
FVC-%PRED-PRE: 85 %
FVC-PRE: 3.38 L
FVC-PRED: 3.96 L
IC-%PRED-PRE: 60 %
IC-PRE: 2.47 L
IC-PRED: 4.06 L
VA-%PRED-PRE: 77 %
VA-PRE: 4.78 L
VC-%PRED-PRE: 64 %
VC-PRE: 2.9 L
VC-PRED: 4.49 L

## 2019-09-26 ENCOUNTER — OFFICE VISIT (OUTPATIENT)
Dept: PULMONOLOGY | Facility: CLINIC | Age: 74
End: 2019-09-26
Payer: COMMERCIAL

## 2019-09-26 VITALS
BODY MASS INDEX: 36.38 KG/M2 | WEIGHT: 254.1 LBS | DIASTOLIC BLOOD PRESSURE: 80 MMHG | OXYGEN SATURATION: 95 % | SYSTOLIC BLOOD PRESSURE: 130 MMHG | HEART RATE: 72 BPM | RESPIRATION RATE: 18 BRPM | HEIGHT: 70 IN

## 2019-09-26 DIAGNOSIS — G60.9 IDIOPATHIC PERIPHERAL NEUROPATHY: ICD-10-CM

## 2019-09-26 DIAGNOSIS — F33.0 MAJOR DEPRESSIVE DISORDER, RECURRENT EPISODE, MILD (H): ICD-10-CM

## 2019-09-26 DIAGNOSIS — J84.9 ILD (INTERSTITIAL LUNG DISEASE) (H): Primary | ICD-10-CM

## 2019-09-26 DIAGNOSIS — I10 HYPERTENSION GOAL BP (BLOOD PRESSURE) < 140/90: ICD-10-CM

## 2019-09-26 DIAGNOSIS — Z23 NEED FOR PROPHYLACTIC VACCINATION AND INOCULATION AGAINST INFLUENZA: ICD-10-CM

## 2019-09-26 LAB
CK SERPL-CCNC: 65 U/L (ref 30–300)
CRP SERPL-MCNC: <2.9 MG/L (ref 0–8)

## 2019-09-26 PROCEDURE — 99214 OFFICE O/P EST MOD 30 MIN: CPT | Mod: 25 | Performed by: INTERNAL MEDICINE

## 2019-09-26 PROCEDURE — 86038 ANTINUCLEAR ANTIBODIES: CPT | Performed by: INTERNAL MEDICINE

## 2019-09-26 PROCEDURE — 86331 IMMUNODIFFUSION OUCHTERLONY: CPT | Mod: 90 | Performed by: INTERNAL MEDICINE

## 2019-09-26 PROCEDURE — 90732 PPSV23 VACC 2 YRS+ SUBQ/IM: CPT | Performed by: INTERNAL MEDICINE

## 2019-09-26 PROCEDURE — 36415 COLL VENOUS BLD VENIPUNCTURE: CPT | Performed by: INTERNAL MEDICINE

## 2019-09-26 PROCEDURE — 86255 FLUORESCENT ANTIBODY SCREEN: CPT | Performed by: INTERNAL MEDICINE

## 2019-09-26 PROCEDURE — 86431 RHEUMATOID FACTOR QUANT: CPT | Performed by: INTERNAL MEDICINE

## 2019-09-26 PROCEDURE — 90662 IIV NO PRSV INCREASED AG IM: CPT | Performed by: INTERNAL MEDICINE

## 2019-09-26 PROCEDURE — 86606 ASPERGILLUS ANTIBODY: CPT | Mod: 90 | Performed by: INTERNAL MEDICINE

## 2019-09-26 PROCEDURE — 82787 IGG 1 2 3 OR 4 EACH: CPT | Performed by: INTERNAL MEDICINE

## 2019-09-26 PROCEDURE — 86235 NUCLEAR ANTIGEN ANTIBODY: CPT | Performed by: INTERNAL MEDICINE

## 2019-09-26 PROCEDURE — 82550 ASSAY OF CK (CPK): CPT | Performed by: INTERNAL MEDICINE

## 2019-09-26 PROCEDURE — 82784 ASSAY IGA/IGD/IGG/IGM EACH: CPT | Performed by: INTERNAL MEDICINE

## 2019-09-26 PROCEDURE — 90471 IMMUNIZATION ADMIN: CPT | Performed by: INTERNAL MEDICINE

## 2019-09-26 PROCEDURE — 86200 CCP ANTIBODY: CPT | Performed by: INTERNAL MEDICINE

## 2019-09-26 PROCEDURE — 90472 IMMUNIZATION ADMIN EACH ADD: CPT | Performed by: INTERNAL MEDICINE

## 2019-09-26 PROCEDURE — 86039 ANTINUCLEAR ANTIBODIES (ANA): CPT | Performed by: INTERNAL MEDICINE

## 2019-09-26 PROCEDURE — 86140 C-REACTIVE PROTEIN: CPT | Performed by: INTERNAL MEDICINE

## 2019-09-26 PROCEDURE — 99000 SPECIMEN HANDLING OFFICE-LAB: CPT | Performed by: INTERNAL MEDICINE

## 2019-09-26 PROCEDURE — 82085 ASSAY OF ALDOLASE: CPT | Mod: 90 | Performed by: INTERNAL MEDICINE

## 2019-09-26 RX ORDER — GABAPENTIN 400 MG/1
CAPSULE ORAL
Qty: 270 CAPSULE | Refills: 1 | OUTPATIENT
Start: 2019-09-26

## 2019-09-26 RX ORDER — BUPROPION HYDROCHLORIDE 300 MG/1
TABLET ORAL
Qty: 90 TABLET | Refills: 1 | OUTPATIENT
Start: 2019-09-26

## 2019-09-26 RX ORDER — METOPROLOL TARTRATE 25 MG/1
TABLET, FILM COATED ORAL
Qty: 180 TABLET | Refills: 1 | OUTPATIENT
Start: 2019-09-26

## 2019-09-26 RX ORDER — LOSARTAN POTASSIUM 50 MG/1
TABLET ORAL
Qty: 90 TABLET | Refills: 1 | OUTPATIENT
Start: 2019-09-26

## 2019-09-26 ASSESSMENT — PAIN SCALES - GENERAL: PAINLEVEL: WORST PAIN (10)

## 2019-09-26 ASSESSMENT — MIFFLIN-ST. JEOR: SCORE: 1898.84

## 2019-09-26 NOTE — PROGRESS NOTES
Reason for Visit  Sylvie Harvey is a 74 year old year old male who is being seen for RECHECK (2 month follow up ILD. Review sleep study 08/20/19. PFT & 6min walk 09/19/19. CT chest 07/23/19.) and Imm/Inj (Flu Shot)    ILD HPI    Sylvie Harvey is a 74-year male follows up today for dyspnea on exertion.  At the last visit I was concerned about obstructive sleep apnea given the patient has significant daytime sleepiness he did undergo a sleep study recently which did show moderate obstructive sleep apnea and the plan is for trial of CPAP he has not obtained the CPAP device yet that is going to happen next week.  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  He did also have a high-resolution CT scan of the chest done which showed some very minimal peripheral interstitial lung abnormalities.  Otherwise doing relatively well with no other new complaints today.      Current Outpatient Medications   Medication     aspirin 81 MG tablet     atorvastatin (LIPITOR) 40 MG tablet     buPROPion (WELLBUTRIN XL) 300 MG 24 hr tablet     gabapentin (NEURONTIN) 400 MG capsule     hydrochlorothiazide (HYDRODIURIL) 25 MG tablet     losartan (COZAAR) 50 MG tablet     metoprolol tartrate (LOPRESSOR) 25 MG tablet     nitroglycerin (NITROSTAT) 0.4 MG sublingual tablet     ticagrelor (BRILINTA) 60 MG tablet     No current facility-administered medications for this visit.      Allergies   Allergen Reactions     Sulfa Drugs      Past Medical History:   Diagnosis Date     Alcohol abuse     Quit over 25 yrs ago     Arthritis      CKD (chronic kidney disease) stage 3, GFR 30-59 ml/min (H) 10/23/2018     Coronary artery disease involving native heart with angina pectoris, unspecified vessel or lesion type (H) 4/1/2016     Drug abuse (H)     Quit over 25 yrs ago     Headaches      Hyperlipidemia LDL goal <130 4/26/2013     Hypertension      Idiopathic peripheral  neuropathy 10/23/2018     Major depressive disorder, recurrent episode, mild (H) 10/18/2016       Past Surgical History:   Procedure Laterality Date     APPENDECTOMY       COLONOSCOPY WITH CO2 INSUFFLATION N/A 2018    Procedure: COLONOSCOPY WITH CO2 INSUFFLATION;  Surgeon: Duane, William Charles, MD;  Location: [facility]     ORTHOPEDIC SURGERY Right     Scar over dorsum aspect of right wrist.      ORTHOPEDIC SURGERY Right     Knee     ORTHOPEDIC SURGERY Bilateral     CTS release     SURGICAL HISTORY OF -       Umbilical Hernia     SURGICAL HISTORY OF -       Coronary Stent       Social History     Socioeconomic History     Marital status:      Spouse name: Jenna     Number of children: 4     Years of education: 12     Highest education level: Not on file   Occupational History     Occupation: [employer]     Employer: OTHER     Comment: Part-time [employer]   Social Needs     Financial resource strain: Not on file     Food insecurity:     Worry: Not on file     Inability: Not on file     Transportation needs:     Medical: Not on file     Non-medical: Not on file   Tobacco Use     Smoking status: Former Smoker     Last attempt to quit: 1983     Years since quittin.4     Smokeless tobacco: Never Used   Substance and Sexual Activity     Alcohol use: No     Alcohol/week: 0.0 standard drinks     Comment: Quit 25 years ago ()     Drug use: No     Sexual activity: Yes     Partners: Female   Lifestyle     Physical activity:     Days per week: Not on file     Minutes per session: Not on file     Stress: Not on file   Relationships     Social connections:     Talks on phone: Not on file     Gets together: Not on file     Attends Zoroastrianism service: Not on file     Active member of club or organization: Not on file     Attends meetings of clubs or organizations: Not on file     Relationship status: Not on file     Intimate partner violence:     Fear of current or ex partner: Not on file     Emotionally  "abused: Not on file     Physically abused: Not on file     Forced sexual activity: Not on file   Other Topics Concern     Parent/sibling w/ CABG, MI or angioplasty before 65F 55M? Not Asked   Social History Narrative     Not on file       Family History   Problem Relation Age of Onset     Rheumatoid Arthritis Mother      LUNG DISEASE No family hx of        ROS Pulmonary    A complete ROS was otherwise negative except as noted in the HPI.  Vitals:    09/26/19 0947   BP: 130/80   Pulse: 72   Resp: 18   SpO2: 95%   Weight: 115.3 kg (254 lb 1.6 oz)   Height: 1.778 m (5' 10\")     Exam:   GENERAL APPEARANCE: Well developed, well nourished, alert, and in no apparent distress.  NECK: supple, no masses, no thyromegaly.  LYMPHATICS: No significant axillary, cervical, or supraclavicular nodes.  RESP: good air flow throughout, - minimal bibasilar crackles  CV: Normal S1, S2, regular rhythm, normal rate, no rub, no murmur,  no gallop, no LE edema.   ABDOMEN:  Bowel sounds normal, soft, nontender, no HSM or masses.   MS: extremities normal- no clubbing, no cyanosis.  PSYCH: mentation appears normal. and affect normal/bright  Results: I have reviewed all imaging, PFTs and other relavent tests, please see below for details, PFT and imaging results were reviewed with the patient.  PFTs: Normal spirometry and diffusing capacity.  DLCO is low normal.  These are stable from previous.  CT scan of the chest (reviewed by me) demonstrates minimal bilateral peripheral interstitial lung abnormalities.  There are also several sub-6 mm nodules present.    Assessment and plan:    74-year-old male with dyspnea and some mild interstitial lung abdomen is on his CT scan.  I do not think this is likely to represent a progressive interstitial fibrotic process.  We will get the standard ILD lab evaluation today.  He is set up to get his CPAP machine and hopefully his dyspnea will improve with treatment of his obstructive sleep apnea.  Otherwise I will " plan to follow the patient along with primary function test to make sure he is stable.  Would consider a repeat CT scan in 1 year for follow-up of the small pulmonary nodules.  I will see him back in 4 months with point function test he will call sooner with any changes in his breathing.      CBC   Recent Labs   Lab Test 04/25/13  1004   RBC 4.85   HGB 15.0   HCT 44.9          Basic Metabolic Panel  Recent Labs   Lab Test 06/20/19  0924 01/05/19 12/07/18  0934     --  137   POTASSIUM 4.0 4.0 4.0   CHLORIDE 104  --  103   CO2 24  --  25   BUN 28  --  26   * 98 94   GABE 8.8  --  9.0       INR  No results for input(s): INR in the last 86170 hours.    PFT  PFT Latest Ref Rng & Units 9/19/2019   FVC L 3.38   FEV1 L 2.73   FVC% % 85   FEV1% % 91           CC:

## 2019-09-26 NOTE — NURSING NOTE
Prior to immunization administration, verified patients identity using patient s name and date of birth. Please see Immunization Activity for additional information.     Screening Questionnaire for Adult Immunization    Are you sick today?   No   Do you have allergies to medications, food, a vaccine component or latex?   No   Have you ever had a serious reaction after receiving a vaccination?   No   Do you have a long-term health problem with heart disease, lung disease, asthma, kidney disease, metabolic disease (e.g. diabetes), anemia, or other blood disorder?   Yes   Do you have cancer, leukemia, HIV/AIDS, or any other immune system problem?   No   In the past 3 months, have you taken medications that affect  your immune system, such as prednisone, other steroids, or anticancer drugs; drugs for the treatment of rheumatoid arthritis, Crohn s disease, or psoriasis; or have you had radiation treatments?   No   Have you had a seizure, or a brain or other nervous system problem?   No   During the past year, have you received a transfusion of blood or blood     products, or been given immune (gamma) globulin or antiviral drug?   No   For women: Are you pregnant or is there a chance you could become        pregnant during the next month?   No   Have you received any vaccinations in the past 4 weeks?   No     Immunization questionnaire was positive for at least one answer.  Notified Dr. Perlman.        Per orders of Dr. Perlman, injection of Pnuemoax 23 given by Grace Bourne LPN. Patient instructed to remain in clinic for 15 minutes afterwards, and to report any adverse reaction to me immediately.       Screening performed by Grace Bourne LPN on 9/26/2019 at 10:46 AM.    Clinic Administered Medication Documentation    MEDICATION LIST: Pneumovax 23

## 2019-09-26 NOTE — LETTER
9/26/2019        RE: Sylvie Harvey  [address] [facility] [address]        Reason for Visit  Sylvie Harvey is a 74 year old year old male who is being seen for RECHECK (2 month follow up ILD. Review sleep study 08/20/19. PFT & 6min walk 09/19/19. CT chest 07/23/19.) and Imm/Inj (Flu Shot)    ILD HPI    Sylvie Harvey is a 74-year male follows up today for dyspnea on exertion.  At the last visit I was concerned about obstructive sleep apnea given the patient has significant daytime sleepiness he did undergo a sleep study recently which did show moderate obstructive sleep apnea and the plan is for trial of CPAP he has not obtained the CPAP device yet that is going to happen next week.  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  He did also have a high-resolution CT scan of the chest done which showed some very minimal peripheral interstitial lung abnormalities.  Otherwise doing relatively well with no other new complaints today.      Current Outpatient Medications   Medication     aspirin 81 MG tablet     atorvastatin (LIPITOR) 40 MG tablet     buPROPion (WELLBUTRIN XL) 300 MG 24 hr tablet     gabapentin (NEURONTIN) 400 MG capsule     hydrochlorothiazide (HYDRODIURIL) 25 MG tablet     losartan (COZAAR) 50 MG tablet     metoprolol tartrate (LOPRESSOR) 25 MG tablet     nitroglycerin (NITROSTAT) 0.4 MG sublingual tablet     ticagrelor (BRILINTA) 60 MG tablet     No current facility-administered medications for this visit.      Allergies   Allergen Reactions     Sulfa Drugs      Past Medical History:   Diagnosis Date     Alcohol abuse     Quit over 25 yrs ago     Arthritis      CKD (chronic kidney disease) stage 3, GFR 30-59 ml/min (H) 10/23/2018     Coronary artery disease involving native heart with angina pectoris, unspecified vessel or lesion type (H) 4/1/2016     Drug abuse (H)     Quit over 25 yrs ago      Headaches      Hyperlipidemia LDL goal <130 2013     Hypertension      Idiopathic peripheral neuropathy 10/23/2018     Major depressive disorder, recurrent episode, mild (H) 10/18/2016       Past Surgical History:   Procedure Laterality Date     APPENDECTOMY       COLONOSCOPY WITH CO2 INSUFFLATION N/A 2018    Procedure: COLONOSCOPY WITH CO2 INSUFFLATION;  Surgeon: Duane, William Charles, MD;  Location: [facility]     ORTHOPEDIC SURGERY Right     Scar over dorsum aspect of right wrist.      ORTHOPEDIC SURGERY Right     Knee     ORTHOPEDIC SURGERY Bilateral     CTS release     SURGICAL HISTORY OF -       Umbilical Hernia     SURGICAL HISTORY OF -       Coronary Stent       Social History     Socioeconomic History     Marital status:      Spouse name: Jenna     Number of children: 4     Years of education: 12     Highest education level: Not on file   Occupational History     Occupation: [employer]     Employer: OTHER     Comment: Part-time [employer]   Social Needs     Financial resource strain: Not on file     Food insecurity:     Worry: Not on file     Inability: Not on file     Transportation needs:     Medical: Not on file     Non-medical: Not on file   Tobacco Use     Smoking status: Former Smoker     Last attempt to quit: 1983     Years since quittin.4     Smokeless tobacco: Never Used   Substance and Sexual Activity     Alcohol use: No     Alcohol/week: 0.0 standard drinks     Comment: Quit 25 years ago ()     Drug use: No     Sexual activity: Yes     Partners: Female   Lifestyle     Physical activity:     Days per week: Not on file     Minutes per session: Not on file     Stress: Not on file   Relationships     Social connections:     Talks on phone: Not on file     Gets together: Not on file     Attends Hoahaoism service: Not on file     Active member of club or organization: Not on file     Attends meetings of clubs or organizations: Not on file     Relationship status: Not on file  "    Intimate partner violence:     Fear of current or ex partner: Not on file     Emotionally abused: Not on file     Physically abused: Not on file     Forced sexual activity: Not on file   Other Topics Concern     Parent/sibling w/ CABG, MI or angioplasty before 65F 55M? Not Asked   Social History Narrative     Not on file       Family History   Problem Relation Age of Onset     Rheumatoid Arthritis Mother      LUNG DISEASE No family hx of        ROS Pulmonary    A complete ROS was otherwise negative except as noted in the HPI.  Vitals:    09/26/19 0947   BP: 130/80   Pulse: 72   Resp: 18   SpO2: 95%   Weight: 115.3 kg (254 lb 1.6 oz)   Height: 1.778 m (5' 10\")     Exam:   GENERAL APPEARANCE: Well developed, well nourished, alert, and in no apparent distress.  NECK: supple, no masses, no thyromegaly.  LYMPHATICS: No significant axillary, cervical, or supraclavicular nodes.  RESP: good air flow throughout, - minimal bibasilar crackles  CV: Normal S1, S2, regular rhythm, normal rate, no rub, no murmur,  no gallop, no LE edema.   ABDOMEN:  Bowel sounds normal, soft, nontender, no HSM or masses.   MS: extremities normal- no clubbing, no cyanosis.  PSYCH: mentation appears normal. and affect normal/bright  Results: I have reviewed all imaging, PFTs and other relavent tests, please see below for details, PFT and imaging results were reviewed with the patient.  PFTs: Normal spirometry and diffusing capacity.  DLCO is low normal.  These are stable from previous.  CT scan of the chest (reviewed by me) demonstrates minimal bilateral peripheral interstitial lung abnormalities.  There are also several sub-6 mm nodules present.    Assessment and plan:    74-year-old male with dyspnea and some mild interstitial lung abdomen is on his CT scan.  I do not think this is likely to represent a progressive interstitial fibrotic process.  We will get the standard ILD lab evaluation today.  He is set up to get his CPAP machine and " hopefully his dyspnea will improve with treatment of his obstructive sleep apnea.  Otherwise I will plan to follow the patient along with primary function test to make sure he is stable.  Would consider a repeat CT scan in 1 year for follow-up of the small pulmonary nodules.  I will see him back in 4 months with point function test he will call sooner with any changes in his breathing.      CBC   Recent Labs   Lab Test 04/25/13  1004   RBC 4.85   HGB 15.0   HCT 44.9          Basic Metabolic Panel  Recent Labs   Lab Test 06/20/19  0924 01/05/19 12/07/18  0934     --  137   POTASSIUM 4.0 4.0 4.0   CHLORIDE 104  --  103   CO2 24  --  25   BUN 28  --  26   * 98 94   GABE 8.8  --  9.0       INR  No results for input(s): INR in the last 05019 hours.    PFT  PFT Latest Ref Rng & Units 9/19/2019   FVC L 3.38   FEV1 L 2.73   FVC% % 85   FEV1% % 91           CC:            Sincerely,        David Morris Perlman, MD

## 2019-09-26 NOTE — TELEPHONE ENCOUNTER
Per OV note dated 6/26/19 from  Jesse Edgar PA-C is as follows:      Return in about 6 months (around 12/26/2019) for recheck.    Duplicate.  These were all sent for 6 months on 6/26/19.  Thank you. Jenni Vasquez R.N.

## 2019-09-26 NOTE — PATIENT INSTRUCTIONS
If you have had any blood work, imaging or other testing completed we will be in touch within 1-2 weeks regarding the results.     If you need refills please contact your pharmacy.     It was a pleasure meeting with you today. Please let us know if there is anything else we can do for you so that we can be sure you are leaving completely satisfied with your care experience.     If you have any questions, concerns or need to schedule a follow up, please contact us at [phone] and our fax [phone].    Your Pulmonology Team at [facility]

## 2019-09-26 NOTE — NURSING NOTE
"Sylvie Harvey's goals for this visit include: Return  He requests these members of his care team be copied on today's visit information: PCP    PCP: Jesse Edgar    Referring Provider:  No referring provider defined for this encounter.    /80   Pulse 72   Resp 18   Ht 1.778 m (5' 10\")   Wt 115.3 kg (254 lb 1.6 oz)   SpO2 95%   BMI 36.46 kg/m      Do you need any medication refills at today's visit? N    "

## 2019-09-27 LAB
ALDOLASE SERPL-CCNC: 3.2 U/L (ref 1.5–8.1)
ANA PAT SER IF-IMP: ABNORMAL
ANA SER QL IF: ABNORMAL
ANA TITR SER IF: ABNORMAL {TITER}
ANCA AB PATTERN SER IF-IMP: NORMAL
C-ANCA TITR SER IF: NORMAL {TITER}
CCP AB SER IA-ACNC: 1 U/ML
ENA JO1 IGG SER-ACNC: <0.2 AI (ref 0–0.9)
ENA SCL70 IGG SER IA-ACNC: <0.2 AI (ref 0–0.9)
ENA SS-A IGG SER IA-ACNC: <0.2 AI (ref 0–0.9)
ENA SS-B IGG SER IA-ACNC: <0.2 AI (ref 0–0.9)
IGG SERPL-MCNC: 1470 MG/DL (ref 695–1620)
IGG1 SER-MCNC: 499 MG/DL (ref 382–929)
IGG2 SER-MCNC: 633 MG/DL (ref 242–700)
IGG3 SER-MCNC: 62 MG/DL (ref 22–176)
IGG4 SER-MCNC: 199 MG/DL (ref 4–86)
RHEUMATOID FACT SER NEPH-ACNC: <20 IU/ML (ref 0–20)

## 2019-09-28 ENCOUNTER — TRANSFERRED RECORDS (OUTPATIENT)
Dept: HEALTH INFORMATION MANAGEMENT | Facility: CLINIC | Age: 74
End: 2019-09-28

## 2019-09-28 LAB
ALT SERPL-CCNC: 16 U/L (ref 0–49)
AST SERPL-CCNC: 17 U/L (ref 0–55)
CREAT SERPL-MCNC: 1.66 MG/DL (ref 0.6–1.2)
GFR SERPL CREATININE-BSD FRML MDRD: 43 ML/MIN/1.73M2
GLUCOSE SERPL-MCNC: 119 MG/DL (ref 70–115)
POTASSIUM SERPL-SCNC: 4 MMOL/L (ref 3.5–5.3)

## 2019-09-29 LAB
CREAT SERPL-MCNC: 1.16 MG/DL (ref 0.6–1.2)
GFR SERPL CREATININE-BSD FRML MDRD: 65 ML/MIN/1.7
GLUCOSE SERPL-MCNC: 110 MG/DL (ref 70–115)
POTASSIUM SERPL-SCNC: 4.1 MMOL/L (ref 3.5–5.3)

## 2019-10-01 ENCOUNTER — DOCUMENTATION ONLY (OUTPATIENT)
Dept: SLEEP MEDICINE | Facility: CLINIC | Age: 74
End: 2019-10-01
Payer: COMMERCIAL

## 2019-10-01 DIAGNOSIS — G47.33 OSA (OBSTRUCTIVE SLEEP APNEA): ICD-10-CM

## 2019-10-01 LAB
A FLAVUS AB SER QL ID: NORMAL
A FUMIGATUS1 AB SER QL ID: NORMAL
A FUMIGATUS2 AB SER QL ID: NORMAL
A FUMIGATUS3 AB SER QL ID: NORMAL
A FUMIGATUS6 AB SER QL ID: NORMAL
A PULLULANS AB SER QL ID: NORMAL
PIGEON DROP IGE QN: NORMAL
S RECTIVIRGULA AB SER QL ID: NORMAL
S VIRIDIS AB SER QL ID: NORMAL
T CANDIDUS AB SER QL: NORMAL
T VULGARIS AB SER QL ID: NORMAL

## 2019-10-01 NOTE — PROGRESS NOTES
Patient was offered choice of vendor and chose [facility].  Sylvie Harvey was set up at [facility] on October 1, 2019 Patient received a Resmed AirSense 10 Auto. Pressures were set at 6-15 cm H2O.   Patient s ramp is 5 cm H2O for Auto and FLEX/EPR is EPR, 2.  Patient received a Resmed Mask name: Airfit P10 Pillow mask Size Medium, heated tubing and heated humidifier.  Patient is enrolled in the STM Program and does not need to meet compliance. Patient has a follow up on 12/10/19 with Dr. Morales.    Martha Bragg

## 2019-10-03 ENCOUNTER — PATIENT OUTREACH (OUTPATIENT)
Dept: PULMONOLOGY | Facility: CLINIC | Age: 74
End: 2019-10-03

## 2019-10-03 NOTE — PROGRESS NOTES
Spoke with patient regarding lab results from last week. Per Dr. Perlman, lab results are normal. Patient appreciative of the information.     Jonathan Monet RN   Pulmonary/CORE Care Coordinator  [facility]

## 2019-10-04 ENCOUNTER — DOCUMENTATION ONLY (OUTPATIENT)
Dept: SLEEP MEDICINE | Facility: CLINIC | Age: 74
End: 2019-10-04

## 2019-10-04 DIAGNOSIS — G47.33 OSA (OBSTRUCTIVE SLEEP APNEA): ICD-10-CM

## 2019-10-04 NOTE — PROGRESS NOTES
3 DAY STM VISIT    Diagnostic AHI:  20.8 HST    Patient contacted for 3 day STM visit  Subjective measures:  Pt states that things have been a struggle.  He feels like the pressure goes up way too fast and too much and wakes him up. He also has difficulty exhaling against the pressure when it's really high.  He is wondering if it can be lowered.  He is histting about 12cm H2O for his 95% pressure.  I changed his response to soft and changed his EPR to 2. I also let him know that if he ever feels like the pressure is too high, he can shut the machine off and turn it back on to get it to go back to 5cm H2O.  Order sent to provider to cap pressure at 13cm H2O to see if that helps him tolerate it.     Replacement device: No     Device type: Auto-CPAP  PAP settings from order::  CPAP min 6 cm  H20       CPAP max 15 cm  H20  Mask type:    Nasal Pillows     Device settings from machine      Min CPAP 6.0            Max CPAP 15.0      Assessment: Nightly usage over four hours.  Action plan: Pt to have f/u 14 day [facility] visit.  Patient has a follow up visit scheduled:   yes within 31-90 days of set up.    Total time spent on remote patient monitoring data analysis and patient contact today:   15 minutes

## 2019-10-10 ENCOUNTER — OFFICE VISIT (OUTPATIENT)
Dept: FAMILY MEDICINE | Facility: CLINIC | Age: 74
End: 2019-10-10
Payer: COMMERCIAL

## 2019-10-10 VITALS
SYSTOLIC BLOOD PRESSURE: 130 MMHG | RESPIRATION RATE: 18 BRPM | BODY MASS INDEX: 35.65 KG/M2 | HEIGHT: 70 IN | DIASTOLIC BLOOD PRESSURE: 78 MMHG | WEIGHT: 249 LBS | OXYGEN SATURATION: 96 % | HEART RATE: 67 BPM

## 2019-10-10 DIAGNOSIS — R50.9 FEVER, UNSPECIFIED FEVER CAUSE: Primary | ICD-10-CM

## 2019-10-10 DIAGNOSIS — D64.9 LOW HEMOGLOBIN: ICD-10-CM

## 2019-10-10 LAB
ALBUMIN SERPL-MCNC: 3.8 G/DL (ref 3.4–5)
ALP SERPL-CCNC: 72 U/L (ref 40–150)
ALT SERPL W P-5'-P-CCNC: 26 U/L (ref 0–70)
ANION GAP SERPL CALCULATED.3IONS-SCNC: 9 MMOL/L (ref 3–14)
AST SERPL W P-5'-P-CCNC: 11 U/L (ref 0–45)
BASOPHILS # BLD AUTO: 0.1 10E9/L (ref 0–0.2)
BASOPHILS NFR BLD AUTO: 0.5 %
BILIRUB SERPL-MCNC: 0.3 MG/DL (ref 0.2–1.3)
BUN SERPL-MCNC: 29 MG/DL (ref 7–30)
CALCIUM SERPL-MCNC: 9.3 MG/DL (ref 8.5–10.1)
CHLORIDE SERPL-SCNC: 104 MMOL/L (ref 94–109)
CO2 SERPL-SCNC: 24 MMOL/L (ref 20–32)
CREAT SERPL-MCNC: 1.33 MG/DL (ref 0.66–1.25)
DIFFERENTIAL METHOD BLD: ABNORMAL
EOSINOPHIL # BLD AUTO: 0.4 10E9/L (ref 0–0.7)
EOSINOPHIL NFR BLD AUTO: 3.8 %
ERYTHROCYTE [DISTWIDTH] IN BLOOD BY AUTOMATED COUNT: 12.9 % (ref 10–15)
GFR SERPL CREATININE-BSD FRML MDRD: 52 ML/MIN/{1.73_M2}
GLUCOSE SERPL-MCNC: 95 MG/DL (ref 70–99)
HCT VFR BLD AUTO: 37.2 % (ref 40–53)
HGB BLD-MCNC: 12.3 G/DL (ref 13.3–17.7)
LYMPHOCYTES # BLD AUTO: 2.5 10E9/L (ref 0.8–5.3)
LYMPHOCYTES NFR BLD AUTO: 25 %
MCH RBC QN AUTO: 30.7 PG (ref 26.5–33)
MCHC RBC AUTO-ENTMCNC: 33.1 G/DL (ref 31.5–36.5)
MCV RBC AUTO: 93 FL (ref 78–100)
MONOCYTES # BLD AUTO: 0.9 10E9/L (ref 0–1.3)
MONOCYTES NFR BLD AUTO: 8.6 %
NEUTROPHILS # BLD AUTO: 6.2 10E9/L (ref 1.6–8.3)
NEUTROPHILS NFR BLD AUTO: 62.1 %
PLATELET # BLD AUTO: 340 10E9/L (ref 150–450)
POTASSIUM SERPL-SCNC: 3.8 MMOL/L (ref 3.4–5.3)
PROT SERPL-MCNC: 8.1 G/DL (ref 6.8–8.8)
RBC # BLD AUTO: 4.01 10E12/L (ref 4.4–5.9)
SODIUM SERPL-SCNC: 137 MMOL/L (ref 133–144)
WBC # BLD AUTO: 10 10E9/L (ref 4–11)

## 2019-10-10 PROCEDURE — 85025 COMPLETE CBC W/AUTO DIFF WBC: CPT | Performed by: PHYSICIAN ASSISTANT

## 2019-10-10 PROCEDURE — 80053 COMPREHEN METABOLIC PANEL: CPT | Performed by: PHYSICIAN ASSISTANT

## 2019-10-10 PROCEDURE — 99213 OFFICE O/P EST LOW 20 MIN: CPT | Performed by: PHYSICIAN ASSISTANT

## 2019-10-10 PROCEDURE — 36415 COLL VENOUS BLD VENIPUNCTURE: CPT | Performed by: PHYSICIAN ASSISTANT

## 2019-10-10 ASSESSMENT — MIFFLIN-ST. JEOR: SCORE: 1875.71

## 2019-10-10 NOTE — PROGRESS NOTES
Subjective   CC: Hospital Follow-up    HPI: Sylvie Harvey is a 74 year old male who presents to clinic today for hospital follow-up. He visited the ER on 9/27 and was admitted with suspicion for pneumonia and treated with antibiotics. He went to the ER after fainting in the bathroom and feeling ill overall, but he did not ever lose consciousness. He believes that it was due getting the flu shot 4 days prior to him getting sick. He was discharged on 9/29 and on antibiotics. He has finished his antibiotic course and is feeling 50-60% better today and asymptomatic, including lack of symptoms like shortness of breath or fever. He is slightly fatigued and weak still, but it is getting better each day. He brought in a paper copy of his medical records from the ED and hospital that were reviewed. The discharging team only recommended a BMP at follow-up. He sees a pulmonologist for his ILD, which he last saw on 9/24 and plans to see him again in January. Currently using CPAP for sleep apnea, which is a work in progress. No other associated symptoms or concerns today.     ED/UC Followup:    Facility:  [facility]  Date of visit: 9/27/19  Reason for visit: flu/pneumonia  Current Status: over 60% better       Patient Active Problem List   Diagnosis     Advanced directives, counseling/discussion     Hypertension goal BP (blood pressure) < 140/90     Hyperlipidemia LDL goal <130     Pain in shoulder     Plantar fasciitis     Medial meniscus tear     Coronary artery disease involving native heart with angina pectoris, unspecified vessel or lesion type (H)     Arthritis     Major depressive disorder, recurrent episode, mild (H)     Idiopathic peripheral neuropathy     CKD (chronic kidney disease) stage 3, GFR 30-59 ml/min (H)     SOB (shortness of breath)     Asbestos exposure     Snores     Obesity (BMI 35.0-39.9) with comorbidity (H)     KIMI (obstructive sleep apnea)     Past Surgical History:   Procedure Laterality Date      "APPENDECTOMY       COLONOSCOPY WITH CO2 INSUFFLATION N/A 2018    Procedure: COLONOSCOPY WITH CO2 INSUFFLATION;  Surgeon: Duane, William Charles, MD;  Location: [facility] OR     ORTHOPEDIC SURGERY Right     Scar over dorsum aspect of right wrist.      ORTHOPEDIC SURGERY Right     Knee     ORTHOPEDIC SURGERY Bilateral     CTS release     SURGICAL HISTORY OF -       Umbilical Hernia     SURGICAL HISTORY OF -       Coronary Stent       Social History     Tobacco Use     Smoking status: Former Smoker     Last attempt to quit: 1983     Years since quittin.5     Smokeless tobacco: Never Used   Substance Use Topics     Alcohol use: No     Alcohol/week: 0.0 standard drinks     Comment: Quit 25 years ago ()     Family History   Problem Relation Age of Onset     Rheumatoid Arthritis Mother      LUNG DISEASE No family hx of          Reviewed and updated as needed this visit by Provider  Tobacco  Allergies  Meds  Problems  Med Hx  Surg Hx  Fam Hx         Review of Systems   ROS COMP: CONSTITUTIONAL: NEGATIVE for fever, chills, change in weight  INTEGUMENTARY/SKIN: NEGATIVE for worrisome rashes, moles or lesions  EYES: NEGATIVE for vision changes or irritation  ENT/MOUTH: NEGATIVE for ear, mouth and throat problems  RESP: NEGATIVE for significant cough or SOB  BREAST: NEGATIVE for masses, tenderness or discharge  CV: NEGATIVE for chest pain, palpitations or peripheral edema  GI: NEGATIVE for nausea, abdominal pain, heartburn, or change in bowel habits  : NEGATIVE for frequency, dysuria, or hematuria  MUSCULOSKELETAL: NEGATIVE for significant arthralgias or myalgia  NEURO: POSITIVE for dizziness. NEGATIVE dizziness or paresthesias  PSYCHIATRIC: NEGATIVE for changes in mood or affect      Objective    /78   Pulse 67   Resp 18   Ht 1.778 m (5' 10\")   Wt 112.9 kg (249 lb)   SpO2 96%   BMI 35.73 kg/m    Body mass index is 35.73 kg/m .  Physical Exam   GENERAL: healthy, alert and no distress  EYES: " Eyes grossly normal to inspection, PERRL and conjunctivae and sclerae normal  HENT: ear canals and TM's normal, nose and mouth without ulcers or lesions  NECK: no adenopathy, no asymmetry, masses, or scars and thyroid normal to palpation  RESP: lungs clear to auscultation - no rales, rhonchi or wheezes. No consolidations apparent upon egophony and tactile fremitis completion.   CV: regular rate and rhythm, normal S1 S2, no S3 or S4, no murmur, click or rub, no peripheral edema and peripheral pulses strong  ABDOMEN: soft, nontender, no hepatosplenomegaly, no masses and bowel sounds normal  MS: no gross musculoskeletal defects noted, no edema  SKIN: no suspicious lesions or rashes  NEURO: Normal strength and tone, mentation intact and speech normal  PSYCH: mentation appears normal, affect normal/bright    Diagnostic Test Results:  Labs: Ordered and will be reviewed with patient once results are received.     Paper medical notes/labs from ED visit/Hospital stay were reviewed and will be scanned into the [employer] EMR system           ICD-10-CM    1. Fever, unspecified fever cause R50.9 **Comprehensive metabolic panel FUTURE anytime     CBC with platelets and differential     1. Labs ordered to ensure stable functions. Provider/nurse to contact patients to review results. Patient to follow-up if he does not continue to get better or gets worse. Patient to continue care with pulmonologist - currently scheduled to see him in January 2020.       SEFERINO Pham    The student acted as a scribe and the encounter documented above was completely performed by myself and the documentation reflects the work I have performed today.     Jesse Edgar PA-C

## 2019-10-11 NOTE — RESULT ENCOUNTER NOTE
Mr. Harvey,    All of your labs were normal/ stable for you.  Your kidney functions are slightly abnormal but stable no other testing needed at this time.  Your hemoglobin is slightly low.  We will recheck this in 4 weeks.  Just make a lab appointment at that time.      Please contact the clinic if you have additional questions.  Thank you.    Sincerely,    Jesse Edgar PA-C

## 2019-10-16 ENCOUNTER — DOCUMENTATION ONLY (OUTPATIENT)
Dept: SLEEP MEDICINE | Facility: CLINIC | Age: 74
End: 2019-10-16

## 2019-10-16 DIAGNOSIS — G47.33 OSA (OBSTRUCTIVE SLEEP APNEA): ICD-10-CM

## 2019-10-16 NOTE — PROGRESS NOTES
14  DAY STM VISIT    Diagnostic AHI:  20.8 HST    Subjective measures:   Pt states that he's struggling with the pressure. He thinks that it's starting too low and would like the starting pressure increased.  He also finds that he is taking off the mask in the middle of the night without realizing it.    Assessment: Pt not meeting objective benchmarks for compliance  Patient failing following subjective benchmarks: pressure issues    Action plan: order placed to provider for pressure change and pt to have 30 day STM visit.      Device type: Auto-CPAP    PAP settings: CPAP min 6.0 cm  H20       CPAP max 13.0 cm  H20      95th% pressure 10.5 cm  H20     Mask type:  Nasal Pillows    Objective measures: 14 day rolling measures      Compliance  50 %      Leak  29.76 lpm  last  upload      AHI 7.49   last  Upload (unknown and central index are occasionally high)      Average number of minutes 219      Objective measure goal  Compliance   Goal >70%  Leak   Goal < 24 lpm  AHI  Goal < 5  Usage  Goal >240      Total time spent on remote patient monitoring data analysis and patient contact today:   15 minutes

## 2019-10-17 ENCOUNTER — TELEPHONE (OUTPATIENT)
Dept: FAMILY MEDICINE | Facility: CLINIC | Age: 74
End: 2019-10-17

## 2019-10-17 NOTE — LETTER
Sylvie Harvey  [address] [address]          October 17, 2019          Dear Sylvie Harvey      Our records indicate that you have not scheduled for a(n)Annual physical exam  which was recommended by your health care team. Monitoring and managing your preventative and chronic health conditions are very important to us.       If you have received your health care elsewhere, please provide us with that information so it can be documented in your chart.    Please call [phone] or message us through your [employer] account to schedule an appointment or provide information for your chart.     We look forward to seeing you and working with you on your health care needs.     Sincerely,   Jesse Edgar PA-C/brian          *If you have already scheduled an appointment, please disregard this reminder

## 2019-11-01 ENCOUNTER — DOCUMENTATION ONLY (OUTPATIENT)
Dept: SLEEP MEDICINE | Facility: CLINIC | Age: 74
End: 2019-11-01
Payer: COMMERCIAL

## 2019-11-01 DIAGNOSIS — G47.33 OSA (OBSTRUCTIVE SLEEP APNEA): ICD-10-CM

## 2019-11-01 NOTE — PROGRESS NOTES
30 DAY STM VISIT    Diagnostic AHI:   20.8 HST    Message left for patient to return call     Assessment: Pt not meeting objective benchmarks for AHI, leak and compliance     Action plan: waiting for patient to return call.  and 2 week STM recheck appt scheduled  Patient has scheduled a follow up visit with Dr. Morales on 12/10/2019   Device type: Auto-CPAP  PAP settings: CPAP min 6.0 cm  H20     CPAP max 13.0 cm  H20    95th% pressure 9.4 cm  H20   Mask type:  Nasal Pillows  Objective measures: 14 day rolling measures      Compliance  57 %      Leak  36.48 lpm  last  upload      AHI 7.95   last  upload      Average number of minutes 248      Objective measure goal  Compliance   Goal >70%  Leak   Goal < 24 lpm  AHI  Goal < 5  Usage  Goal >240      Total time spent on remote patient monitoring data analysis and patient contact today:   10 minutes      Total contact/remote patient monitoring for last 30 days:   40 min

## 2019-11-15 ENCOUNTER — DOCUMENTATION ONLY (OUTPATIENT)
Dept: SLEEP MEDICINE | Facility: CLINIC | Age: 74
End: 2019-11-15

## 2019-11-15 DIAGNOSIS — G47.33 OSA (OBSTRUCTIVE SLEEP APNEA): ICD-10-CM

## 2019-11-15 NOTE — PROGRESS NOTES
STM recheck     Diagnostic AHI:   20.8 PSG    Subjective measures:  Pt reports that he hates the machine and that he is not feeling any benefit form therapy.  He still feels like he is still having apneas.  He is agreeable to trying a pressure change to see if this improves.  He is also working on his mask fit.  Mask leak is variable.      Assessment: Pt not meeting objective benchmarks for AHI, leak and compliance  Patient failing following subjective benchmarks: not feeling benefit from therapy.  Continued obstructive events at lower pressures causing mask removals.     Action plan: 2 week STM recheck appt scheduled and follow up per provider request .  Order placed to provider.     Device type: Auto-CPAP    PAP settings: CPAP min 6.0 cm  H20       CPAP max 13.0 cm  H20      95th% pressure 8.9 cm  H20     Mask type:  Nasal Pillows    Objective measures: 14 day rolling measures      Compliance  42 %      Leak  38.83 lpm  last  upload      AHI 5.19   last  upload      Average number of minutes 227      Objective measure goal  Compliance   Goal >70%  Leak   Goal < 24 lpm  AHI  Goal < 5  Usage  Goal >240      Total time spent on remote patient monitoring data analysis and patient contact today:   15 minutes

## 2019-11-17 DIAGNOSIS — G60.9 IDIOPATHIC PERIPHERAL NEUROPATHY: ICD-10-CM

## 2019-11-17 DIAGNOSIS — F33.0 MAJOR DEPRESSIVE DISORDER, RECURRENT EPISODE, MILD (H): ICD-10-CM

## 2019-11-17 DIAGNOSIS — I10 HYPERTENSION GOAL BP (BLOOD PRESSURE) < 140/90: ICD-10-CM

## 2019-11-18 NOTE — TELEPHONE ENCOUNTER
gabapentin (NEURONTIN) 400 MG capsule    Medication may not be due for a refill.    Last Written Prescription Date:  6/26/2019  Last Fill Quantity: 270 capsule,   # refills: 1  Last Office Visit: 10/10/2019 [facility]    Future Office visit:    Next 5 appointments (look out 90 days)    Jan 14, 2020 10:00 AM CST  SHORT with PFT LAB  [facility] [id number] [place] [id number]  [phone]   Jan 16, 2020  1:30 PM CST  Return Visit with David Morris Perlman, MD  [facility] ([facility]) [address] [place] [address]  [phone]           Routing refill request to provider for review/approval because:  Drug not on the [facility], [facility] or [facility] refill protocol or controlled substance               buPROPion (WELLBUTRIN XL) 300 MG 24 hr tablet [Pharmacy Med Name: BUPROPION  300MG  TAB  XL 24 HR]  Medication may not be due for a refill.  Last Written Prescription Date:  6/26/2019  Last Fill Quantity: 90 tablet,  # refills: 1   Last office visit: 10/10/2019 with prescribing provider:  [facility]     Future Office Visit:   Next 5 appointments (look out 90 days)    Jan 14, 2020 10:00 AM CST  SHORT with PFT LAB  [facility] ([facility]) [address] [place] [address]  [phone]   Jan 16, 2020  1:30 PM CST  Return Visit with David Morris Perlman, MD  [facility]) [address] [place] [address]  [phone]            90 tablet 1     Sig: TAKE 1 TABLET BY MOUTH  EVERY MORNING       SSRIs Protocol Passed - 11/17/2019  3:40 AM        Passed - PHQ-9 score less than 5 in past 6 months     Please review last PHQ-9 score.     PHQ-9 SCORE 4/24/2018 10/23/2018 6/26/2019   PHQ-9 Total Score - - -   PHQ-9 Total Score 1 0 0     ARY-7 SCORE 10/22/2015 10/23/2018 6/26/2019   Total Score - - -   Total Score 0 0 0            "Passed - Medication is Bupropion     If the medication is Bupropion (Wellbutrin), and the patient is taking for smoking cessation; OK to refill.          Passed - Medication is active on med list        Passed - Patient is age 18 or older        Passed - Recent (6 mo) or future (30 days) visit within the authorizing provider's specialty     Patient had office visit in the last 6 months or has a visit in the next 30 days with authorizing provider or within the authorizing provider's specialty.  See \"Patient Info\" tab in inbasket, or \"Choose Columns\" in Meds & Orders section of the refill encounter.                      metoprolol tartrate (LOPRESSOR) 25 MG tablet [Pharmacy Med Name: METOPROLOL TARTRATE  25MG  TAB]  Medication may not be due for a refill.  Last Written Prescription Date:  6/26/2019  Last Fill Quantity: 180 tablet,  # refills: 1   Last office visit: 10/10/2019 with prescribing provider:  Tala     Future Office Visit:   Next 5 appointments (look out 90 days)    Jan 14, 2020 10:00 AM CST  SHORT with PFT LAB  [facility]) [address] [place] [id number]  [phone]   Jan 16, 2020  1:30 PM CST  Return Visit with David Morris Perlman, MD  [facility]) [address] [place] [address]  [phone]            180 tablet 1     Sig: TAKE 1 TABLET BY MOUTH TWO  TIMES DAILY       Beta-Blockers Protocol Failed - 11/17/2019  3:40 AM        Failed - Recent (12 mo) or future (30 days) visit within the authorizing provider's specialty     Patient has had an office visit with the authorizing provider or a provider within the authorizing providers department within the previous 12 mos or has a future within next 30 days. See \"Patient Info\" tab in inbasket, or \"Choose Columns\" in Meds & Orders section of the refill encounter.              Passed - Blood pressure under 140/90 in past 12 months     " "BP Readings from Last 3 Encounters:   10/10/19 130/78   09/26/19 130/80   09/17/19 127/77                 Passed - Patient is age 6 or older        Passed - Medication is active on med list                  losartan (COZAAR) 50 MG tablet [Pharmacy Med Name: LOSARTAN  50MG  TAB]  Medication may not be due for a refill.  Last Written Prescription Date:  6/26/2019  Last Fill Quantity: 90 tablet,  # refills: 1   Last office visit: 10/10/2019 with prescribing provider:  Tala     Future Office Visit:   Next 5 appointments (look out 90 days)    Jan 14, 2020 10:00 AM CST  [facility] [address] [place] [phone]  [phone]   Jan 16, 2020  1:30 PM CST  Return Visit with David Morris Perlman, MD  [facility]) [address] [place] [phone]  [phone]            90 tablet 1     Sig: TAKE 1 TABLET BY MOUTH  DAILY       Angiotensin-II Receptors Failed - 11/17/2019  3:40 AM        Failed - Recent (12 mo) or future (30 days) visit within the authorizing provider's specialty     Patient has had an office visit with the authorizing provider or a provider within the authorizing providers department within the previous 12 mos or has a future within next 30 days. See \"Patient Info\" tab in inbasket, or \"Choose Columns\" in Meds & Orders section of the refill encounter.              Failed - Normal serum creatinine on file in past 12 months     Recent Labs   Lab Test 10/10/19  1519   CR 1.33*             Passed - Last blood pressure under 140/90 in past 12 months     BP Readings from Last 3 Encounters:   10/10/19 130/78   09/26/19 130/80   09/17/19 127/77             Passed - Medication is active on med list        Passed - Patient is age 18 or older        Passed - Normal serum potassium on file in past 12 months     Recent Labs   Lab Test 10/10/19  1519   POTASSIUM 3.8              "

## 2019-11-19 ENCOUNTER — OFFICE VISIT (OUTPATIENT)
Dept: ORTHOPEDICS | Facility: CLINIC | Age: 74
End: 2019-11-19
Payer: COMMERCIAL

## 2019-11-19 ENCOUNTER — ANCILLARY PROCEDURE (OUTPATIENT)
Dept: GENERAL RADIOLOGY | Facility: CLINIC | Age: 74
End: 2019-11-19
Attending: PEDIATRICS
Payer: COMMERCIAL

## 2019-11-19 VITALS
DIASTOLIC BLOOD PRESSURE: 66 MMHG | HEIGHT: 70 IN | SYSTOLIC BLOOD PRESSURE: 128 MMHG | WEIGHT: 248 LBS | BODY MASS INDEX: 35.5 KG/M2

## 2019-11-19 DIAGNOSIS — M25.512 CHRONIC PAIN OF BOTH SHOULDERS: Primary | ICD-10-CM

## 2019-11-19 DIAGNOSIS — G89.29 CHRONIC PAIN OF BOTH SHOULDERS: ICD-10-CM

## 2019-11-19 DIAGNOSIS — M25.511 CHRONIC PAIN OF BOTH SHOULDERS: Primary | ICD-10-CM

## 2019-11-19 DIAGNOSIS — M25.512 CHRONIC PAIN OF BOTH SHOULDERS: ICD-10-CM

## 2019-11-19 DIAGNOSIS — M25.511 CHRONIC PAIN OF BOTH SHOULDERS: ICD-10-CM

## 2019-11-19 DIAGNOSIS — G89.29 CHRONIC PAIN OF BOTH SHOULDERS: Primary | ICD-10-CM

## 2019-11-19 PROCEDURE — 73030 X-RAY EXAM OF SHOULDER: CPT | Mod: RT

## 2019-11-19 PROCEDURE — 99204 OFFICE O/P NEW MOD 45 MIN: CPT | Performed by: PEDIATRICS

## 2019-11-19 RX ORDER — DIAZEPAM 5 MG
TABLET ORAL
Qty: 2 TABLET | Refills: 0 | Status: SHIPPED | OUTPATIENT
Start: 2019-11-19 | End: 2019-12-10

## 2019-11-19 ASSESSMENT — MIFFLIN-ST. JEOR: SCORE: 1871.17

## 2019-11-19 NOTE — TELEPHONE ENCOUNTER
Patient recently had hospital follow up.  Did you want patient to be seen again or will refill based on recent office visit?  Please advise  Gisselle Lubin RN

## 2019-11-19 NOTE — PATIENT INSTRUCTIONS
Concern for rotator cuff tear  MRI ordered for both shoulders  Valium prescription for claustrophobia  Will call with MRI results     Advanced imaging is done by appointment. Some insurance require a prior authorization to be completed which may delay the time until you are able to schedule your appointment.Please call [place] [place] [facility] [place]: [phone] to schedule your MRI.  Depending on your availability you can usually schedule within the next 1-2 days.    The clinic will call you with results, if you have not heard from the clinic within 3-4 days following your MRI please contact us at the number listed below.   If you have any further questions for your physician or physician s care team you can call [phone] and use option 3 to leave a voice message. Calls received during business hours will be returned same day.

## 2019-11-19 NOTE — PROGRESS NOTES
Sports Medicine Clinic Visit    PCP: Jesse Edgar BRII Harvey is a 74 year old male who is seen  as a self referral presenting with bilateral shoulder pain.  Pain diffuse thru both shoulders and does travel down his arm.  Did have a fall a few weeks ago and did catch himself, aggravating his shoulders more.   Pain has been present for many years, but the pain has been increasing recently.   He is right left hand dominant.       **    Right shoulder pain has started recently relative to the left. Pain is diffuse in shoulders bilaterally and radiates down arms to elbow.    Injury: ongoing     Location of Pain: bilateral shoulders   Duration of Pain: 2+ year(s)  Rating of Pain at worst: 10/10  Rating of Pain Currently: 0/10  Symptoms are better with: Rest  Symptoms are worse with: IR, reaching, lifting, movement, overhead  Additional Features:   Positive: popping and instability   Negative: swelling, bruising, grinding, catching, locking, paresthesias, numbness, weakness, pain in other joints and systemic symptoms  Other evaluation and/or treatments so far consists of: denies   Prior History of related problems: chart reivew shows he was seen 6 years ago for his left shoulder     Social History: Kelly     Review of Systems  Musculoskeletal: as above  Remainder of review of systems is negative including constitutional, CV, pulmonary, GI, Skin and Neurologic except as noted in HPI or medical history.    Past Medical History:   Diagnosis Date     Alcohol abuse     Quit over 25 yrs ago     Arthritis      CKD (chronic kidney disease) stage 3, GFR 30-59 ml/min (H) 10/23/2018     Coronary artery disease involving native heart with angina pectoris, unspecified vessel or lesion type (H) 4/1/2016     Drug abuse (H)     Quit over 25 yrs ago     Headaches      Hyperlipidemia LDL goal <130 4/26/2013     Hypertension      Idiopathic peripheral neuropathy 10/23/2018     Major depressive disorder, recurrent  episode, mild (H) 10/18/2016     Past Surgical History:   Procedure Laterality Date     APPENDECTOMY       COLONOSCOPY WITH CO2 INSUFFLATION N/A 2018    Procedure: COLONOSCOPY WITH CO2 INSUFFLATION;  Surgeon: Duane, William Charles, MD;  Location: [facility]     ORTHOPEDIC SURGERY Right     Scar over dorsum aspect of right wrist.      ORTHOPEDIC SURGERY Right     Knee     ORTHOPEDIC SURGERY Bilateral     CTS release     SURGICAL HISTORY OF -       Umbilical Hernia     SURGICAL HISTORY OF -       Coronary Stent     Family History   Problem Relation Age of Onset     Rheumatoid Arthritis Mother      LUNG DISEASE No family hx of      Social History     Socioeconomic History     Marital status:      Spouse name: Jenna     Number of children: 4     Years of education: 12     Highest education level: Not on file   Occupational History     Occupation: [employer]     Employer: OTHER     Comment: Part-time [employer]   Social Needs     Financial resource strain: Not on file     Food insecurity:     Worry: Not on file     Inability: Not on file     Transportation needs:     Medical: Not on file     Non-medical: Not on file   Tobacco Use     Smoking status: Former Smoker     Last attempt to quit: 1983     Years since quittin.6     Smokeless tobacco: Never Used   Substance and Sexual Activity     Alcohol use: No     Alcohol/week: 0.0 standard drinks     Comment: Quit 25 years ago ()     Drug use: No     Sexual activity: Yes     Partners: Female   Lifestyle     Physical activity:     Days per week: Not on file     Minutes per session: Not on file     Stress: Not on file   Relationships     Social connections:     Talks on phone: Not on file     Gets together: Not on file     Attends Advent service: Not on file     Active member of club or organization: Not on file     Attends meetings of clubs or organizations: Not on file     Relationship status: Not on file     Intimate partner violence:     Fear of  "current or ex partner: Not on file     Emotionally abused: Not on file     Physically abused: Not on file     Forced sexual activity: Not on file   Other Topics Concern     Parent/sibling w/ CABG, MI or angioplasty before 65F 55M? Not Asked   Social History Narrative     Not on file     This document serves as a record of the services and decisions personally performed and made by DO MIRNA Souza. It was created on his behalf by Chad Art, a trained medical scribe. The creation of this document is based the provider's statements to the medical scribe.    Scribe Chad Art 12:33 PM 11/19/2019       Objective  /66   Ht 1.778 m (5' 10\")   Wt 112.5 kg (248 lb)   BMI 35.58 kg/m        GENERAL APPEARANCE: healthy, alert and no distress   GAIT: NORMAL  SKIN: no suspicious lesions or rashes  NEURO: Normal strength and tone, mentation intact and speech normal  PSYCH:  mentation appears normal and affect normal/bright  HEENT: no scleral icterus  CV: Distal perfusion intact.   RESP: nonlabored breathing     Bilateral Shoulder exam    ROM:        forward flexion: 130 degrees on right, 30-40 degrees on left with shoulder hiking        Abduction: 45-50 with shoulder hiking on left, 110 degrees on right       internal rotation: Back pocket on each side with pain       external rotation: Limited on both sides with no pain    Strength:        Abduction: giving way on left, otherwise similar to right       External rotation: 4+/5 (R), 4/5 (L)       Internal rotation 5/5 (R), 5/5 (L    Impingement testing:        neg (-) empty can (R) with pain        pos (+) empty can (L) with pain    Skin:       no visible deformities       well perfused       capillary refill brisk    Sensation:        normal sensation over shoulder and upper extremity       Radiology  Visualized radiographs of the bilateral shoulder today, and reviewed the images with the patient.  Impression: AC arthrosis bilat.     Recent Results (from " the past 24 hour(s))   XR Shoulder 3 View Bilateral    Narrative    XR SHOULDER 3 VIEWS BILATERAL 11/19/2019 12:12 PM     HISTORY: Chronic pain of both shoulders; Chronic pain of both  shoulders; Chronic pain of both shoulders    COMPARISON: 4/17/2013.      Impression    IMPRESSION:     Right: Mild AC joint arthrosis. Glenohumeral joint intact. No  fracture, subluxation, osseous lesion, or calcific tendinopathy.    Left: Mild AC joint arthrosis. Glenohumeral joint intact. No fracture,  subluxation, osseous lesion, or calcific tendinopathy.    NASREEN CROTES MD        Visualized MRI of the left shoulder, 8/2/2013 , and reviewed images and report with patient.  MRI demonstrates findings below       MRI LEFT UPPER EXTREMITY WITHOUT CONTRAST 8/2/2013 9:41 AM     HISTORY: Shoulder pain.     TECHNIQUE:  Axial dual echo T2. Coronal T1. Coronal T2 with and  without fat suppression. Sagittal T2.     FINDINGS:  Osseous acromion outlet: Mild motion artifact. Changes of active AC  arthrosis including a small amount of joint fluid. There is  mild-moderate inferior hypertrophic change of the AC joint which  results in some impression on the supraspinatus musculotendinous  junction. Type II acromion.     Rotator cuff: Fairly prominent supraspinatus tendinosis. There is a  tear involving the middle portion of the supraspinatus tendon. This  has combined partial-thickness and full-thickness involvement. The  full-thickness component could require careful probing and  arthroscopy. The tear measures 0.8 cm AP and there is no significant  associated muscle atrophy. There is mild-moderate ganglion cyst  formation extending along the infraspinatus myotendinous region.     Labral structures: There is a subcentimeter cystic-appearing lesion  adjacent to the posterosuperior labrum. Although I do not clearly  identify a discrete adjacent labral tear, this is suspicious for an  occult tear of the superior labrum posteriorly or  posterosuperior  labrum. If indicated clinically, MR arthrography would be considered  the study of choice in this regard.     Biceps tendon: Intact.     Osseous and cartilaginous structures: Resorptive change of the  humeral head.     Additional findings: Very mild glenohumeral joint effusion. There is  fluid in the subacromial bursa.     IMPRESSION  IMPRESSION:  1. 0.8 cm supraspinatus tendon tear with combined full-thickness and  partial-thickness involvement.  2. Some anatomic findings which can predispose to impingement.  3. Small cystic lesion adjacent to the posterosuperior labrum. An  underlying labral tear cannot be excluded.  4. Very mild effusion.     SHAHBAZ PADILLA MD      Assessment:  1. Chronic pain of both shoulders        Plan:  Discussed the assessment with the patient. He is interested in further diagnostics, given pain, limited function. Also, had cuff tearing left shoulder on prior MRI. Will obtain bilateral shoulder MRI. Valium rx for MRI.   Pertinent potential adverse effect profile of prescribed medication(s) was discussed with the patient, who expressed understanding.    **    Radiologic images reviewed and discussed with patient today.  We discussed the following: symptom treatment, activity modification/rest, imaging, rehab, injection therapy and referral to an orthopedic surgeon. Following discussion, plan:     Topical treatments: Ice/OTC prn  Imaging: MRI of bilateral shoulder ordered. He notes he is claustrophobic, so Valium prescribed for when he goes in to get the MRI. Discussed if he needs an anesthesiologist present, MRI will need to be done at a hospital.  Rehab: Discussed possibility of getting PT. May consider this based on MRI   Offered PT. He prefers imaging to start.  Injection: Discussed. Will just opt for the MRI today.   Offered subacromial steroid injection as well. Await MRI.  Patient inquired about  surgery. May consider referral based on results of MRI  Follow up: Clinic  will call him with MRI results  Future considerations: injection, therapy, Referral to orthopedic surgery  Questions answered.  Patient demonstrated understanding of these issues and agrees with the plan.       Jeevan Diego DO, CAQ          Patient Instructions   Concern for rotator cuff tear  MRI ordered for both shoulders  Valium prescription for claustrophobia  Will call with MRI results     Advanced imaging is done by appointment. Some insurance require a prior authorization to be completed which may delay the time until you are able to schedule your appointment.Please call [place] [place] [facility] [place]: [phone] to schedule your MRI.  Depending on your availability you can usually schedule within the next 1-2 days.    The clinic will call you with results, if you have not heard from the clinic within 3-4 days following your MRI please contact us at the number listed below.   If you have any further questions for your physician or physician s care team you can call [phone] and use option 3 to leave a voice message. Calls received during business hours will be returned same day.                Disclaimer: This note consists of symbols derived from keyboarding, dictation and/or voice recognition software. As a result, there may be errors in the script that have gone undetected. Please consider this when interpreting information found in this chart.

## 2019-11-19 NOTE — LETTER
11/19/2019         RE: Sylvie Harvey  [id number] [address] [id number]        Dear Colleague,    Thank you for referring your patient, Sylvie Harvey, to the [facility]. Please see a copy of my visit note below.    Sports Medicine Clinic Visit    PCP: Jesse Edgar    Sylvie Harvey is a 74 year old male who is seen  as a self referral presenting with bilateral shoulder pain.  Pain diffuse thru both shoulders and does travel down his arm.  Did have a fall a few weeks ago and did catch himself, aggravating his shoulders more.   Pain has been present for many years, but the pain has been increasing recently.   He is right left hand dominant.       **    Right shoulder pain has started recently relative to the left. Pain is diffuse in shoulders bilaterally and radiates down arms to elbow.    Injury: ongoing     Location of Pain: bilateral shoulders   Duration of Pain: 2+ year(s)  Rating of Pain at worst: 10/10  Rating of Pain Currently: 0/10  Symptoms are better with: Rest  Symptoms are worse with: IR, reaching, lifting, movement, overhead  Additional Features:   Positive: popping and instability   Negative: swelling, bruising, grinding, catching, locking, paresthesias, numbness, weakness, pain in other joints and systemic symptoms  Other evaluation and/or treatments so far consists of: denies   Prior History of related problems: chart reivew shows he was seen 6 years ago for his left shoulder     Social History: Kelly     Review of Systems  Musculoskeletal: as above  Remainder of review of systems is negative including constitutional, CV, pulmonary, GI, Skin and Neurologic except as noted in HPI or medical history.    Past Medical History:   Diagnosis Date     Alcohol abuse     Quit over 25 yrs ago     Arthritis      CKD (chronic kidney disease) stage 3, GFR 30-59 ml/min (H) 10/23/2018     Coronary artery disease involving native heart with  angina pectoris, unspecified vessel or lesion type (H) 2016     Drug abuse (H)     Quit over 25 yrs ago     Headaches      Hyperlipidemia LDL goal <130 2013     Hypertension      Idiopathic peripheral neuropathy 10/23/2018     Major depressive disorder, recurrent episode, mild (H) 10/18/2016     Past Surgical History:   Procedure Laterality Date     APPENDECTOMY       COLONOSCOPY WITH CO2 INSUFFLATION N/A 2018    Procedure: COLONOSCOPY WITH CO2 INSUFFLATION;  Surgeon: Duane, William Charles, MD;  Location: [facility]     ORTHOPEDIC SURGERY Right     Scar over dorsum aspect of right wrist.      ORTHOPEDIC SURGERY Right     Knee     ORTHOPEDIC SURGERY Bilateral     CTS release     SURGICAL HISTORY OF -       Umbilical Hernia     SURGICAL HISTORY OF -       Coronary Stent     Family History   Problem Relation Age of Onset     Rheumatoid Arthritis Mother      LUNG DISEASE No family hx of      Social History     Socioeconomic History     Marital status:      Spouse name: Jenna     Number of children: 4     Years of education: 12     Highest education level: Not on file   Occupational History     Occupation: [employer]     Employer: OTHER     Comment: Part-time [employer]   Social Needs     Financial resource strain: Not on file     Food insecurity:     Worry: Not on file     Inability: Not on file     Transportation needs:     Medical: Not on file     Non-medical: Not on file   Tobacco Use     Smoking status: Former Smoker     Last attempt to quit: 1983     Years since quittin.6     Smokeless tobacco: Never Used   Substance and Sexual Activity     Alcohol use: No     Alcohol/week: 0.0 standard drinks     Comment: Quit 25 years ago ()     Drug use: No     Sexual activity: Yes     Partners: Female   Lifestyle     Physical activity:     Days per week: Not on file     Minutes per session: Not on file     Stress: Not on file   Relationships     Social connections:     Talks on phone: Not on file  "    Gets together: Not on file     Attends Mormonism service: Not on file     Active member of club or organization: Not on file     Attends meetings of clubs or organizations: Not on file     Relationship status: Not on file     Intimate partner violence:     Fear of current or ex partner: Not on file     Emotionally abused: Not on file     Physically abused: Not on file     Forced sexual activity: Not on file   Other Topics Concern     Parent/sibling w/ CABG, MI or angioplasty before 65F 55M? Not Asked   Social History Narrative     Not on file     This document serves as a record of the services and decisions personally performed and made by DO MIRNA Souza. It was created on his behalf by Chad Art, a trained medical scribe. The creation of this document is based the provider's statements to the medical scribe.    Scribe Chad Art 12:33 PM 11/19/2019       Objective  /66   Ht 1.778 m (5' 10\")   Wt 112.5 kg (248 lb)   BMI 35.58 kg/m         GENERAL APPEARANCE: healthy, alert and no distress   GAIT: NORMAL  SKIN: no suspicious lesions or rashes  NEURO: Normal strength and tone, mentation intact and speech normal  PSYCH:  mentation appears normal and affect normal/bright  HEENT: no scleral icterus  CV: Distal perfusion intact.   RESP: nonlabored breathing     Bilateral Shoulder exam    ROM:        forward flexion: 130 degrees on right, 30-40 degrees on left with shoulder hiking        Abduction: 45-50 with shoulder hiking on left, 110 degrees on right       internal rotation: Back pocket on each side with pain       external rotation: Limited on both sides with no pain    Strength:        Abduction: giving way on left, otherwise similar to right       External rotation: 4+/5 (R), 4/5 (L)       Internal rotation 5/5 (R), 5/5 (L    Impingement testing:        neg (-) empty can (R) with pain        pos (+) empty can (L) with pain    Skin:       no visible deformities       well perfused       " capillary refill brisk    Sensation:        normal sensation over shoulder and upper extremity       Radiology  Visualized radiographs of the bilateral shoulder today, and reviewed the images with the patient.  Impression: AC arthrosis bilat.     Recent Results (from the past 24 hour(s))   XR Shoulder 3 View Bilateral    Narrative    XR SHOULDER 3 VIEWS BILATERAL 11/19/2019 12:12 PM     HISTORY: Chronic pain of both shoulders; Chronic pain of both  shoulders; Chronic pain of both shoulders    COMPARISON: 4/17/2013.      Impression    IMPRESSION:     Right: Mild AC joint arthrosis. Glenohumeral joint intact. No  fracture, subluxation, osseous lesion, or calcific tendinopathy.    Left: Mild AC joint arthrosis. Glenohumeral joint intact. No fracture,  subluxation, osseous lesion, or calcific tendinopathy.    NASREEN CORTES MD        Visualized MRI of the left shoulder, 8/2/2013 , and reviewed images and report with patient.  MRI demonstrates findings below       MRI LEFT UPPER EXTREMITY WITHOUT CONTRAST 8/2/2013 9:41 AM     HISTORY: Shoulder pain.     TECHNIQUE:  Axial dual echo T2. Coronal T1. Coronal T2 with and  without fat suppression. Sagittal T2.     FINDINGS:  Osseous acromion outlet: Mild motion artifact. Changes of active AC  arthrosis including a small amount of joint fluid. There is  mild-moderate inferior hypertrophic change of the AC joint which  results in some impression on the supraspinatus musculotendinous  junction. Type II acromion.     Rotator cuff: Fairly prominent supraspinatus tendinosis. There is a  tear involving the middle portion of the supraspinatus tendon. This  has combined partial-thickness and full-thickness involvement. The  full-thickness component could require careful probing and  arthroscopy. The tear measures 0.8 cm AP and there is no significant  associated muscle atrophy. There is mild-moderate ganglion cyst  formation extending along the infraspinatus myotendinous region.      Labral structures: There is a subcentimeter cystic-appearing lesion  adjacent to the posterosuperior labrum. Although I do not clearly  identify a discrete adjacent labral tear, this is suspicious for an  occult tear of the superior labrum posteriorly or posterosuperior  labrum. If indicated clinically, MR arthrography would be considered  the study of choice in this regard.     Biceps tendon: Intact.     Osseous and cartilaginous structures: Resorptive change of the  humeral head.     Additional findings: Very mild glenohumeral joint effusion. There is  fluid in the subacromial bursa.     IMPRESSION  IMPRESSION:  1. 0.8 cm supraspinatus tendon tear with combined full-thickness and  partial-thickness involvement.  2. Some anatomic findings which can predispose to impingement.  3. Small cystic lesion adjacent to the posterosuperior labrum. An  underlying labral tear cannot be excluded.  4. Very mild effusion.     SHAHBAZ PADILLA MD      Assessment:  1. Chronic pain of both shoulders        Plan:  Discussed the assessment with the patient. He is interested in further diagnostics, given pain, limited function. Also, had cuff tearing left shoulder on prior MRI. Will obtain bilateral shoulder MRI. Valium rx for MRI.   Pertinent potential adverse effect profile of prescribed medication(s) was discussed with the patient, who expressed understanding.    **    Radiologic images reviewed and discussed with patient today.  We discussed the following: symptom treatment, activity modification/rest, imaging, rehab, injection therapy and referral to an orthopedic surgeon. Following discussion, plan:     Topical treatments: Ice/OTC prn  Imaging: MRI of bilateral shoulder ordered. He notes he is claustrophobic, so Valium prescribed for when he goes in to get the MRI. Discussed if he needs an anesthesiologist present, MRI will need to be done at a hospital.  Rehab: Discussed possibility of getting PT. May consider this based on  MRI   Offered PT. He prefers imaging to start.  Injection: Discussed. Will just opt for the MRI today.   Offered subacromial steroid injection as well. Await MRI.  Patient inquired about  surgery. May consider referral based on results of MRI  Follow up: Clinic will call him with MRI results  Future considerations: injection, therapy, Referral to orthopedic surgery  Questions answered.  Patient demonstrated understanding of these issues and agrees with the plan.       Jeevan Diego DO, CAQ          Patient Instructions   Concern for rotator cuff tear  MRI ordered for both shoulders  Valium prescription for claustrophobia  Will call with MRI results     Advanced imaging is done by appointment. Some insurance require a prior authorization to be completed which may delay the time until you are able to schedule your appointment.Please call [place], [place] [facility] [place]: [phone] to schedule your MRI.  Depending on your availability you can usually schedule within the next 1-2 days.    The clinic will call you with results, if you have not heard from the clinic within 3-4 days following your MRI please contact us at the number listed below.   If you have any further questions for your physician or physician s care team you can call [phone] and use option 3 to leave a voice message. Calls received during business hours will be returned same day.                Disclaimer: This note consists of symbols derived from keyboarding, dictation and/or voice recognition software. As a result, there may be errors in the script that have gone undetected. Please consider this when interpreting information found in this chart.        Again, thank you for allowing me to participate in the care of your patient.        Sincerely,        Jeevan Diego DO

## 2019-11-20 DIAGNOSIS — F33.0 MAJOR DEPRESSIVE DISORDER, RECURRENT EPISODE, MILD (H): ICD-10-CM

## 2019-11-20 DIAGNOSIS — G60.9 IDIOPATHIC PERIPHERAL NEUROPATHY: ICD-10-CM

## 2019-11-20 DIAGNOSIS — I10 HYPERTENSION GOAL BP (BLOOD PRESSURE) < 140/90: ICD-10-CM

## 2019-11-20 RX ORDER — GABAPENTIN 400 MG/1
CAPSULE ORAL 3 TIMES DAILY
OUTPATIENT
Start: 2019-11-20

## 2019-11-20 RX ORDER — METOPROLOL TARTRATE 25 MG/1
TABLET, FILM COATED ORAL
Qty: 180 TABLET | Refills: 1 | Status: SHIPPED | OUTPATIENT
Start: 2019-11-20 | End: 2020-08-11

## 2019-11-20 RX ORDER — LOSARTAN POTASSIUM 50 MG/1
TABLET ORAL
Qty: 90 TABLET | Refills: 1 | Status: SHIPPED | OUTPATIENT
Start: 2019-11-20 | End: 2020-08-11

## 2019-11-20 RX ORDER — BUPROPION HYDROCHLORIDE 300 MG/1
TABLET ORAL
Qty: 90 TABLET | Refills: 1 | Status: SHIPPED | OUTPATIENT
Start: 2019-11-20 | End: 2020-05-05

## 2019-11-20 RX ORDER — METOPROLOL TARTRATE 25 MG/1
TABLET, FILM COATED ORAL
Qty: 180 TABLET | Refills: 1 | OUTPATIENT
Start: 2019-11-20

## 2019-11-20 RX ORDER — BUPROPION HYDROCHLORIDE 300 MG/1
300 TABLET ORAL EVERY MORNING
Qty: 90 TABLET | Refills: 1 | OUTPATIENT
Start: 2019-11-20

## 2019-11-20 RX ORDER — LOSARTAN POTASSIUM 50 MG/1
50 TABLET ORAL DAILY
Qty: 90 TABLET | Refills: 1 | OUTPATIENT
Start: 2019-11-20

## 2019-11-20 RX ORDER — GABAPENTIN 400 MG/1
CAPSULE ORAL
Qty: 270 CAPSULE | Refills: 1 | Status: SHIPPED | OUTPATIENT
Start: 2019-11-20 | End: 2020-05-05

## 2019-11-22 ENCOUNTER — DOCUMENTATION ONLY (OUTPATIENT)
Dept: SLEEP MEDICINE | Facility: CLINIC | Age: 74
End: 2019-11-22

## 2019-11-22 DIAGNOSIS — G47.33 OSA (OBSTRUCTIVE SLEEP APNEA): ICD-10-CM

## 2019-11-22 NOTE — Clinical Note
Sawyer Mercer continues to struggle with therapy due to discomfort from pressures.  He is no longer feeling like he is having apneas after the recent pressure change, however now is waking to too much pressure. He is willing to try another pressure change in hopes that maybe we can solve both the apnea issue and the pressure intolerance.  I have pended an order to narrow the pressure range in hope that this improves his tolerance.  His follow up with you is scheduled 12/10.  I have scheduled him another recheck in a few weeks.Yanely

## 2019-11-22 NOTE — PROGRESS NOTES
STM recheck after pressure change    Diagnostic AHI:   20.8 HST    Subjective measures:   Patient continues to struggle with machine.  He is complaining of feeling like he will wake now to too much pressure.  He still is not yet feeling benefit from therapy but is willing to try to continue to use with a few more adjustments.  He is continuing to work on mask fit.   He will be traveling over the next week but will be bringing the device with him.     Assessment: Pt not meeting objective benchmarks for AHI, leak and compliance  Patient failing following subjective benchmarks: pressure issues, leak issues  and not feeling benefit from therapy    Action plan: order placed to provider for pressure change and 2 week STM recheck appt scheduled      Device type: Auto-CPAP    PAP settings: CPAP min 9.0 cm  H20       CPAP max 13.0 cm  H20        95th% pressure 9.1 cm  H20     Mask type:  Nasal Pillows    Objective measures: 14 day rolling measures      Compliance  35 %      Leak  33.69 lpm  last  upload      AHI 5.56   last  upload      Average number of minutes 199      Objective measure goal  Compliance   Goal >70%  Leak   Goal < 24 lpm  AHI  Goal < 5  Usage  Goal >240      Total time spent on remote patient monitoring data analysis and patient contact today:   14 minutes

## 2019-12-06 ENCOUNTER — DOCUMENTATION ONLY (OUTPATIENT)
Dept: SLEEP MEDICINE | Facility: CLINIC | Age: 74
End: 2019-12-06

## 2019-12-06 DIAGNOSIS — G47.33 OSA (OBSTRUCTIVE SLEEP APNEA): ICD-10-CM

## 2019-12-06 NOTE — PROGRESS NOTES
STM recheck     Diagnostic AHI:   20.8 HST    Subjective measures:   Pt feels things have not gotten any better since we have made some pressure adjustments.  He now feels that the mask is leaking more than before.  He does have a follow up scheduled next week to review with provider.      Assessment: Pt not meeting objective benchmarks for AHI, leak and compliance  Patient failing following subjective benchmarks: leak issues  and not feeling benefit from therapy.  Patient will review options with provider at his upcoming follow up visit.     Action plan: follow up visit with provider  12/10/2019       Device type: Auto-CPAP    PAP settings: CPAP min 9.0 cm  H20       CPAP max 10.0 cm  H20           95th% pressure 9.8 cm  H20     Mask type:  Nasal Pillows    Objective measures: 14 day rolling measures      Compliance  64 %      Leak  53.2 lpm  last  upload      AHI 6.67   last  upload      Average number of minutes 250      Objective measure goal  Compliance   Goal >70%  Leak   Goal < 24 lpm  AHI  Goal < 5  Usage  Goal >240      Total time spent on accessing, reviewing and interpreting remote patient PAP therapy data:   15  minutes

## 2019-12-07 ENCOUNTER — ANCILLARY PROCEDURE (OUTPATIENT)
Dept: MRI IMAGING | Facility: CLINIC | Age: 74
End: 2019-12-07
Attending: PEDIATRICS
Payer: COMMERCIAL

## 2019-12-07 DIAGNOSIS — G89.29 CHRONIC PAIN OF BOTH SHOULDERS: ICD-10-CM

## 2019-12-07 DIAGNOSIS — M25.511 CHRONIC PAIN OF BOTH SHOULDERS: ICD-10-CM

## 2019-12-07 DIAGNOSIS — M25.512 CHRONIC PAIN OF BOTH SHOULDERS: ICD-10-CM

## 2019-12-07 PROCEDURE — 73221 MRI JOINT UPR EXTREM W/O DYE: CPT | Mod: TC

## 2019-12-09 ENCOUNTER — TELEPHONE (OUTPATIENT)
Dept: ORTHOPEDICS | Facility: CLINIC | Age: 74
End: 2019-12-09

## 2019-12-09 NOTE — TELEPHONE ENCOUNTER
MR SHOULDER RIGHT WITHOUT CONTRAST  12/7/2019 9:28 AM     HISTORY: Shoulder pain, rotator cuff tear/impingement suspected;  evaluate rotator cuff; fall six months ago. Persistent weakness and  decreased range of motion.     COMPARISON: 11/19/2019 x-rays.     TECHNIQUE: Coronal oblique T1, T2, and fat suppressed T2, sagittal  oblique T2, and transverse proton density and T2 weighted images.     FINDINGS: Images mildly degraded by motion.     Osseous acromial outlet: There is a type II subacromial configuration.  Mild subacromial spur/enthesophyte. Moderate AC degenerative arthrosis  and marginal hypertrophic change. Findings would correlate with  subacromial impingement. Subcoracoid interval patent.     Rotator cuff: Moderate to severe supraspinatus tendinopathy with  partial-thickness articular surface tear central footprint measuring  12 mm AP width x 12 mm mediolateral length involving 50-75% tendon  thickness with intact bursal surface fibers. No evidence for  full-thickness extension or retraction.     Moderate infraspinatus and subscapularis tendinopathy without discrete  tear. Mild interstitial cystic delamination within the infraspinatus  myotendinous junction with small dissecting cysts.     Mild supraspinatus muscle atrophy. Musculature otherwise preserved.     Labral Structures: Degeneration and tearing posterior labrum.     Biceps Tendon: Moderate to severe tendinopathy with partial-thickness  longitudinal split tearing. No subluxation.     Osseous structures: Mild resorptive cysts are noted along the  anatomical neck of the humerus beneath the infraspinatus tendon  attachment. Marrow signal about the shoulder is otherwise  unremarkable. No significant/visible chondromalacia allowing for  blurring from motion. No fracture or osseous lesion.     Joint space: Mild joint effusion and synovitis.     Additional findings: Mild fluid and synovitis subacromial/subdeltoid  bursa.                                                                        IMPRESSION:   1. Moderate/severe supraspinatus tendinopathy with partial-thickness  articular surface tear. Mild muscle atrophy.  2. Infraspinatus and subscapularis tendinopathy without discrete tear.  Mild interstitial cystic changes infraspinatus myotendinous junction.  Remainder of musculature preserved.  3. Narrowing supraspinatus outlet would correlate with subacromial  impingement.  4. Mild subacromial/subdeltoid bursitis.  5. Biceps tendinopathy and interstitial longitudinal split tearing  without subluxation.  6. Degeneration and tearing posterior labrum.     NASREEN CORTES MD        MR SHOULDER LEFT WITHOUT CONTRAST  12/7/2019 9:28 AM     HISTORY:  Shoulder pain, rotator cuff tear/impingement suspected.  Evaluate rotator cuff. Chronic pain of both shoulders. Fall about six  months ago. Weakness and decreased range of motion.     COMPARISON: 11/19/2019 x-rays.     TECHNIQUE: Coronal oblique T1, T2, and fat suppressed T2, sagittal  oblique T2, and transverse proton density and T2 weighted images.     FINDINGS:   Osseous acromial outlet: There is a acquired type III subacromial  configuration. Moderate anterior subacromial spur/enthesophyte.  Moderate AC joint arthrosis and inferior hypertrophic changes.  Superior migration humeral head with severe narrowing of the  acromiohumeral space. Patent subcoracoid interval.     Rotator cuff: Complete full thickness full width supraspinatus and  infraspinatus tears with retraction tendon edge up to 5.5 cm to the  superior glenoid. Severe supraspinatus and moderate infraspinatus  muscle atrophy. Teres minor muscle and tendon remain intact.     Tendinopathy and ill-defined partial thickness tearing upper  subscapularis tendon. No full thickness extension or fiber retraction.  Subscapularis muscle preserved.     Labral Structures: Labrum blurred due to motion with evidence for  degeneration and degenerative tearing of the posterior  and superior  labrum. No paralabral cyst.     Biceps Tendon: Moderate to severe tendinopathy, surface fraying, and  probable partial thickness longitudinal split tearing in the rotator  interval without subluxation.     Osseous structures: No bone contusion/bone marrow edema, fracture, or  osseous lesion. Mild chronic resorptive change greater tuberosity.  Localized area of severe chondromalacia superior humeral head near the  rotator cuff footprint and broad-based moderate chondromalacia in the  glenoid. Cartilage is blurred due to motion.     Joint space: Moderate joint effusion with tenosynovitis joint  recesses.     Additional findings: Moderate fluid and synovitis in the overlying  subacromial/subdeltoid and subscapularis bursae.                                                                       IMPRESSION:   1. Retracted complete full thickness full width supraspinatus and  infraspinatus tendon tears. Severe supraspinatus and moderate  infraspinatus muscle atrophy.  2. Tendinopathy and indistinct partial thickness tearing upper  subscapularis footprint.  3. Biceps tendinopathy, surface fraying, and probable longitudinal  split tearing of the rotator interval. No subluxation.  4. Severe narrowing coracoacromial arch would correlate with  subacromial impingement.  5. No acute osseous pathology. Glenohumeral joint chondromalacia.  6. Moderate joint effusion and synovitis. Fluid and synovitis in the  overlying bursae.  7. Degeneration/tearing superior and posterior labrum.     NASREEN CORTES MD

## 2019-12-10 ENCOUNTER — TELEPHONE (OUTPATIENT)
Dept: ORTHOPEDICS | Facility: CLINIC | Age: 74
End: 2019-12-10

## 2019-12-10 ENCOUNTER — OFFICE VISIT (OUTPATIENT)
Dept: SLEEP MEDICINE | Facility: CLINIC | Age: 74
End: 2019-12-10
Payer: COMMERCIAL

## 2019-12-10 VITALS
DIASTOLIC BLOOD PRESSURE: 83 MMHG | WEIGHT: 254 LBS | HEIGHT: 70 IN | BODY MASS INDEX: 36.36 KG/M2 | HEART RATE: 90 BPM | SYSTOLIC BLOOD PRESSURE: 133 MMHG | OXYGEN SATURATION: 96 %

## 2019-12-10 DIAGNOSIS — G47.33 OSA (OBSTRUCTIVE SLEEP APNEA): ICD-10-CM

## 2019-12-10 PROCEDURE — 99213 OFFICE O/P EST LOW 20 MIN: CPT | Performed by: INTERNAL MEDICINE

## 2019-12-10 ASSESSMENT — MIFFLIN-ST. JEOR: SCORE: 1898.39

## 2019-12-10 NOTE — NURSING NOTE
"Chief Complaint   Patient presents with     CPAP Follow Up       Initial BP (!) 143/85   Pulse 90   Ht 1.778 m (5' 10\")   Wt 115.2 kg (254 lb)   SpO2 96%   BMI 36.45 kg/m   Estimated body mass index is 36.45 kg/m  as calculated from the following:    Height as of this encounter: 1.778 m (5' 10\").    Weight as of this encounter: 115.2 kg (254 lb).    Medication Reconciliation: complete      "

## 2019-12-10 NOTE — PROGRESS NOTES
"Obstructive Sleep Apnea - PAP Follow-Up Visit:    Chief Complaint   Patient presents with     CPAP Follow Up     Sylvie Harvey comes in today for follow-up of their moderate sleep apnea, managed with CPAP.     No specialty comments available.  Since set-up has been struggling with pressure intolerance  Started at Auto 6 - 15 and has had it adjusted multiple times through our STM program.  Down to 9 - 10 cmH2O and still feeling like he's getting too much pressure.    Leak is stably elevated despite lowering max pressure.  He is using P10 and feels pillows fit well. Doesn't feel mouth is more dry on CPAP than before.      Overall, he rates the experience with PAP as 3 (0 poor, 10 great). The mask is comfortable.    The mask is leaking at his around the nose.  The mask is leaking 7 nights per week.  He is not snoring with the mask on. He is having gasp arousals.  He is not having significant oral/nasal dryness. The pressure is not comfortable. The pressure is uncomfortable because of \"too strong\".    His PAP interface is Nasal Pillows.    Bedtime is typically 2200. Usually it takes about 40 min minutes to fall asleep with the mask on. Wake time is typically 0330.  Patient is using PAP therapy 4 hours per night. The patient is usually getting 4 hours of sleep per night.    He does not feel rested in the morning.    Total score - [place]: 2 (12/10/2019  9:00 AM)    ResMed - Auto-PAP 9.0 - 10.0 cmH2O 30 day usage data:    46% of days with > 4 hours of use. 0/30 days with no use.   Average use 231 minutes per day.   95%ile Leak 44.59 L/min.   CPAP 95% pressure 9.5 cm.   AHI 5.79 events per hour.     Past medical/surgical history, family history, social history, medications and allergies were reviewed.      Problem List:  Patient Active Problem List    Diagnosis Date Noted     KIMI (obstructive sleep apnea) 09/17/2019     Priority: Medium     9/17/2019 [facility] - AHI 20.8/hr; Supine AHI 73.9/hr; " "SpO2 <= 88% for 19.7 minutes.        Obesity (BMI 35.0-39.9) with comorbidity (H) 07/18/2019     Priority: Medium     SOB (shortness of breath) 06/26/2019     Priority: Medium     Asbestos exposure 06/26/2019     Priority: Medium     Snores 06/26/2019     Priority: Medium     Idiopathic peripheral neuropathy 10/23/2018     Priority: Medium     CKD (chronic kidney disease) stage 3, GFR 30-59 ml/min (H) 10/23/2018     Priority: Medium     Advanced directives, counseling/discussion 04/24/2018     Priority: Medium     Advance Care Planning: Info given. TREVON Benjamin MA             Major depressive disorder, recurrent episode, mild (H) 10/18/2016     Priority: Medium     Coronary artery disease involving native heart with angina pectoris, unspecified vessel or lesion type (H) 04/01/2016     Priority: Medium     Arthritis 04/01/2016     Priority: Medium     Medial meniscus tear 02/23/2015     Priority: Medium     Plantar fasciitis 06/25/2013     Priority: Medium     Pain in shoulder 05/18/2013     Priority: Medium     Hyperlipidemia LDL goal <130 04/26/2013     Priority: Medium     Hypertension goal BP (blood pressure) < 140/90 04/25/2013     Priority: Medium      /83   Pulse 90   Ht 1.778 m (5' 10\")   Wt 115.2 kg (254 lb)   SpO2 96%   BMI 36.45 kg/m      Impression/Plan:  1. KIMI (obstructive sleep apnea)  Moderate Sleep apnea. Tolerating PAP but struggling with pressure and leak.   Plan to lower pressure a bit more. AHI is elevated but 1/3 of events are Un[place] apneas, so I suspect addressing leak might actually improve things overall.  I'd like to see if he can get a different pillows interface (or surface nasal mask option).  - COMPREHENSIVE DME      Sylvie Harvey will follow up in about 3 month(s).     Fifteen minutes spent with patient, all of which were spent face-to-face counseling, consulting, coordinating plan of care.      CC:  Jesse Edgar,     "

## 2019-12-10 NOTE — Clinical Note
I want to try lowering pressure a bit more. Any ideas on how we could get him a different interface too?Blane Burch

## 2019-12-11 NOTE — TELEPHONE ENCOUNTER
"See reports.  On the left, prominent rotator cuff tearing, with retraction.  On the right, there is partial thickness cuff tearing, not full by report.  Otherwise, see details from report.  For each shoulder, consideratinos are physical therapy, trial of steroid injection (for pain relief, not a \"fix\"), and referral to see an orthopedic surgeon.  Any option is fine. On the left, I would certainly consider having him see an orthopedic surgeon, but with nature of tearing and with retraction and atrophy, not sure whether surgery would be recommended.  If he has interest in discussing surgery (\"fix\"), particularly for left shoulder, then would refer. If surgery not recommended, then likely he would be offered injection at his visit, or he could return to clinic and I could do.  Thanks.  Jeevan Diego, , CAQ    "

## 2019-12-11 NOTE — TELEPHONE ENCOUNTER
Discussed with patient.  He would like to try injection.  Unsure if he would like both injected, will decide prior to follow up, which is scheduled for 12/17/19  Karen Bains MS ATC

## 2019-12-13 ENCOUNTER — DOCUMENTATION ONLY (OUTPATIENT)
Dept: SLEEP MEDICINE | Facility: CLINIC | Age: 74
End: 2019-12-13

## 2019-12-13 DIAGNOSIS — G47.33 OSA (OBSTRUCTIVE SLEEP APNEA): ICD-10-CM

## 2019-12-13 NOTE — PROGRESS NOTES
STM recheck     Diagnostic AHI:   20.8 HST    Subjective measures:   Pt reports that things are much better since the pressure change.  He has an appointment scheduled with DME for mask exchange.      Assessment: Pt meeting objective benchmarks.  Patient meeting subjective benchmarks.     Action plan: follow up per provider request      Device type: Auto-CPAP    PAP settings: CPAP min 7.0 cm  H20       CPAP max 9.0 cm  H20          Mask type:  Nasal Pillows    Objective measures: 14 day rolling measures      Compliance  57 %      Leak  49.76 lpm  last  upload      AHI 2.75   last  upload      Average number of minutes 278      Objective measure goal  Compliance   Goal >70%  Leak   Goal < 24 lpm  AHI  Goal < 5  Usage  Goal >240      Total time spent on accessing, reviewing and interpreting remote patient PAP therapy data:   10  minutes      Total time spent with direct patient communication :   5  minutes

## 2019-12-16 ENCOUNTER — DOCUMENTATION ONLY (OUTPATIENT)
Dept: SLEEP MEDICINE | Facility: CLINIC | Age: 74
End: 2019-12-16
Payer: COMMERCIAL

## 2019-12-16 DIAGNOSIS — G47.33 OSA (OBSTRUCTIVE SLEEP APNEA): ICD-10-CM

## 2019-12-16 NOTE — PROGRESS NOTES
Patient came to [place] for mask fitting appointment on December 16, 2019.  Patient requested to switch masks because of pressure settings blowing Resmed Airfit P10 Pillow mask out of patient's nose.  He is interested in receiving the P10 when the new adjustable chin strap when it is released in 2020.  Patient tried and selected a Resmed Mask name: [place] FX Pillow mask size Medium.  Patient declined to try any additional masks today.      Patient did say it has been going much better Dr. Morales lowered the maximum pressure during his recent appointment.     Patient is not eligible for supplies until 01/01/20 but requested they be shipped on that Thursday or Friday as he will be out of town on 01/01/20.

## 2019-12-17 ENCOUNTER — OFFICE VISIT (OUTPATIENT)
Dept: ORTHOPEDICS | Facility: CLINIC | Age: 74
End: 2019-12-17
Payer: COMMERCIAL

## 2019-12-17 VITALS
SYSTOLIC BLOOD PRESSURE: 124 MMHG | HEIGHT: 70 IN | WEIGHT: 254 LBS | BODY MASS INDEX: 36.36 KG/M2 | DIASTOLIC BLOOD PRESSURE: 62 MMHG

## 2019-12-17 DIAGNOSIS — M25.512 CHRONIC PAIN OF BOTH SHOULDERS: Primary | ICD-10-CM

## 2019-12-17 DIAGNOSIS — M75.122 COMPLETE TEAR OF LEFT ROTATOR CUFF, UNSPECIFIED WHETHER TRAUMATIC: ICD-10-CM

## 2019-12-17 DIAGNOSIS — G89.29 CHRONIC PAIN OF BOTH SHOULDERS: Primary | ICD-10-CM

## 2019-12-17 DIAGNOSIS — M25.511 CHRONIC PAIN OF BOTH SHOULDERS: Primary | ICD-10-CM

## 2019-12-17 DIAGNOSIS — M75.111 INCOMPLETE TEAR OF RIGHT ROTATOR CUFF, UNSPECIFIED WHETHER TRAUMATIC: ICD-10-CM

## 2019-12-17 PROCEDURE — 20610 DRAIN/INJ JOINT/BURSA W/O US: CPT | Mod: RT | Performed by: PEDIATRICS

## 2019-12-17 PROCEDURE — 99213 OFFICE O/P EST LOW 20 MIN: CPT | Mod: 25 | Performed by: PEDIATRICS

## 2019-12-17 RX ADMIN — TRIAMCINOLONE ACETONIDE 40 MG: 40 INJECTION, SUSPENSION INTRA-ARTICULAR; INTRAMUSCULAR at 10:36

## 2019-12-17 RX ADMIN — LIDOCAINE HYDROCHLORIDE 2 ML: 10 INJECTION, SOLUTION INFILTRATION; PERINEURAL at 10:36

## 2019-12-17 ASSESSMENT — MIFFLIN-ST. JEOR: SCORE: 1898.39

## 2019-12-17 NOTE — PATIENT INSTRUCTIONS
Left rotator cuff tear; right rotator cuff degenerative changes, partial tear  Right subacromial steroid injection done today  Future considerations include physical therapy, injection left shoulder, and referral to orthopedic surgeon       Injection Discharge Instructions      You may shower, however avoid swimming, tub baths or hot tubs for 24 hours following your procedure    You may have a mild to moderate increase in pain for several days following the injection.    It may take up to 14 days for the steroid medication to start working although you may feel the effect as early as a few days after the procedure.    You may use ice packs for 10-15 minutes, 3 to 4 times a day at the injection site for comfort    You may use anti-inflammatory medications (such as Ibuprofen or Aleve or Advil) or Tylenol for pain control if necessary    If you were fasting, you may resume your normal diet and medications after the procedure    If you have diabetes, check your blood sugar more frequently than usual as your blood sugar may be higher than normal for 10-14 days following a steroid injection. Contact your doctor who manages your diabetes if your blood sugar is higher than usual      If you experience any of the following, call [facility] @ [phone]   -Fever over 100 degree F  -Swelling, bleeding, redness, drainage, warmth at the injection site  - New or worsening pain

## 2019-12-29 RX ORDER — LIDOCAINE HYDROCHLORIDE 10 MG/ML
2 INJECTION, SOLUTION INFILTRATION; PERINEURAL
Status: DISCONTINUED | OUTPATIENT
Start: 2019-12-17 | End: 2020-08-19

## 2019-12-29 RX ORDER — TRIAMCINOLONE ACETONIDE 40 MG/ML
40 INJECTION, SUSPENSION INTRA-ARTICULAR; INTRAMUSCULAR
Status: DISCONTINUED | OUTPATIENT
Start: 2019-12-17 | End: 2020-08-19

## 2020-01-14 ENCOUNTER — OFFICE VISIT (OUTPATIENT)
Dept: NURSING | Facility: CLINIC | Age: 75
End: 2020-01-14
Payer: COMMERCIAL

## 2020-01-14 DIAGNOSIS — J84.9 ILD (INTERSTITIAL LUNG DISEASE) (H): ICD-10-CM

## 2020-01-14 PROCEDURE — 94729 DIFFUSING CAPACITY: CPT | Performed by: INTERNAL MEDICINE

## 2020-01-14 PROCEDURE — 94375 RESPIRATORY FLOW VOLUME LOOP: CPT | Performed by: INTERNAL MEDICINE

## 2020-01-16 ENCOUNTER — OFFICE VISIT (OUTPATIENT)
Dept: PULMONOLOGY | Facility: CLINIC | Age: 75
End: 2020-01-16
Payer: COMMERCIAL

## 2020-01-16 VITALS
HEIGHT: 70 IN | DIASTOLIC BLOOD PRESSURE: 79 MMHG | SYSTOLIC BLOOD PRESSURE: 131 MMHG | HEART RATE: 71 BPM | RESPIRATION RATE: 18 BRPM | WEIGHT: 253.5 LBS | OXYGEN SATURATION: 93 % | BODY MASS INDEX: 36.29 KG/M2

## 2020-01-16 DIAGNOSIS — J84.9 ILD (INTERSTITIAL LUNG DISEASE) (H): Primary | ICD-10-CM

## 2020-01-16 LAB
DLCOUNC-%PRED-PRE: 62 %
DLCOUNC-PRE: 15.28 ML/MIN/MMHG
DLCOUNC-PRED: 24.52 ML/MIN/MMHG
ERV-%PRED-PRE: 84 %
ERV-PRE: 0.37 L
ERV-PRED: 0.44 L
EXPTIME-PRE: 6.89 SEC
FEF2575-%PRED-PRE: 135 %
FEF2575-PRE: 3 L/SEC
FEF2575-PRED: 2.22 L/SEC
FEFMAX-%PRED-PRE: 75 %
FEFMAX-PRE: 5.8 L/SEC
FEFMAX-PRED: 7.72 L/SEC
FEV1-%PRED-PRE: 94 %
FEV1-PRE: 2.8 L
FEV1FEV6-PRE: 81 %
FEV1FEV6-PRED: 77 %
FEV1FVC-PRE: 81 %
FEV1FVC-PRED: 76 %
FEV1SVC-PRE: 89 %
FEV1SVC-PRED: 66 %
FIFMAX-PRE: 5 L/SEC
FVC-%PRED-PRE: 87 %
FVC-PRE: 3.46 L
FVC-PRED: 3.96 L
IC-%PRED-PRE: 68 %
IC-PRE: 2.78 L
IC-PRED: 4.06 L
VA-%PRED-PRE: 73 %
VA-PRE: 4.49 L
VC-%PRED-PRE: 70 %
VC-PRE: 3.15 L
VC-PRED: 4.49 L

## 2020-01-16 PROCEDURE — 99214 OFFICE O/P EST MOD 30 MIN: CPT | Performed by: INTERNAL MEDICINE

## 2020-01-16 ASSESSMENT — MIFFLIN-ST. JEOR: SCORE: 1896.12

## 2020-01-16 ASSESSMENT — PAIN SCALES - GENERAL: PAINLEVEL: EXTREME PAIN (8)

## 2020-01-16 NOTE — PATIENT INSTRUCTIONS
If you have had any blood work, imaging or other testing completed we will be in touch within 1-2 weeks regarding the results.     If you need refills please contact your pharmacy.     It was a pleasure meeting with you today. Please let us know if there is anything else we can do for you so that we can be sure you are leaving completely satisfied with your care experience.     If you have any questions, concerns or need to schedule a follow up, please contact us at [phone] and our fax [phone].    Your Pulmonology Team at [facility]

## 2020-01-16 NOTE — NURSING NOTE
"Sylvie Harvey's goals for this visit include: Return  He requests these members of his care team be copied on today's visit information: PCP    PCP: Jesse Edgar    Referring Provider:  No referring provider defined for this encounter.    /79 (BP Location: Right arm, Patient Position: Sitting, Cuff Size: Adult Large)   Pulse 71   Resp 18   Ht 1.778 m (5' 10\")   Wt 115 kg (253 lb 8 oz)   SpO2 93%   BMI 36.37 kg/m      Do you need any medication refills at today's visit? N    "

## 2020-01-16 NOTE — LETTER
1/16/2020        RE: Sylvie Harvey  [address] [facility] [id number]        Reason for Visit  Sylvie Harvey is a 74 year old year old male who is being seen for RECHECK (ILD follow up - PFT 01/14/20)    ILD HPI    Sylvie Harvey is a 74 old male with mild interstitial lung abnormalities on CT scan who is seen today for follow-up.  Not clear that he has progressive disease he initially was evaluated for dyspnea on exertion and high-resolution CT scan demonstrated mild peripheral interstitial infiltrates.  ILD panel was unremarkable.  He also had untreated sleep apnea that time and he has been following the sleep clinic and he uses his CPAP regularly although he dislikes it quite significantly.  Overall from a breathing standpoint he has not noted any changes still has significant dyspnea on exertion does not feel this has progressed.  Does have known coronary artery disease and had a follow-up with an outside cardiologist recently but this was not addressed.      Current Outpatient Medications   Medication     aspirin 81 MG tablet     atorvastatin (LIPITOR) 40 MG tablet     buPROPion (WELLBUTRIN XL) 300 MG 24 hr tablet     gabapentin (NEURONTIN) 400 MG capsule     hydrochlorothiazide (HYDRODIURIL) 25 MG tablet     losartan (COZAAR) 50 MG tablet     metoprolol tartrate (LOPRESSOR) 25 MG tablet     nitroglycerin (NITROSTAT) 0.4 MG sublingual tablet     ticagrelor (BRILINTA) 60 MG tablet     Current Facility-Administered Medications   Medication     lidocaine 1 % injection 2 mL     triamcinolone (KENALOG-40) injection 40 mg     Allergies   Allergen Reactions     Sulfa Drugs      Past Medical History:   Diagnosis Date     Alcohol abuse     Quit over 25 yrs ago     Arthritis      CKD (chronic kidney disease) stage 3, GFR 30-59 ml/min (H) 10/23/2018     Coronary artery disease involving native heart with angina pectoris, unspecified vessel or lesion type (H) 4/1/2016     Drug abuse (H)      Quit over 25 yrs ago     Headaches      Hyperlipidemia LDL goal <130 2013     Hypertension      Idiopathic peripheral neuropathy 10/23/2018     Major depressive disorder, recurrent episode, mild (H) 10/18/2016       Past Surgical History:   Procedure Laterality Date     APPENDECTOMY       COLONOSCOPY WITH CO2 INSUFFLATION N/A 2018    Procedure: COLONOSCOPY WITH CO2 INSUFFLATION;  Surgeon: Duane, William Charles, MD;  Location: [facility] OR     ORTHOPEDIC SURGERY Right     Scar over dorsum aspect of right wrist.      ORTHOPEDIC SURGERY Right     Knee     ORTHOPEDIC SURGERY Bilateral     CTS release     SURGICAL HISTORY OF -       Umbilical Hernia     SURGICAL HISTORY OF -       Coronary Stent       Social History     Socioeconomic History     Marital status:      Spouse name: Jenna     Number of children: 4     Years of education: 12     Highest education level: Not on file   Occupational History     Occupation: [employer]     Employer: OTHER     Comment: Part-time [employer]   Social Needs     Financial resource strain: Not on file     Food insecurity:     Worry: Not on file     Inability: Not on file     Transportation needs:     Medical: Not on file     Non-medical: Not on file   Tobacco Use     Smoking status: Former Smoker     Last attempt to quit: 1983     Years since quittin.7     Smokeless tobacco: Never Used   Substance and Sexual Activity     Alcohol use: No     Alcohol/week: 0.0 standard drinks     Comment: Quit 25 years ago ()     Drug use: No     Sexual activity: Yes     Partners: Female   Lifestyle     Physical activity:     Days per week: Not on file     Minutes per session: Not on file     Stress: Not on file   Relationships     Social connections:     Talks on phone: Not on file     Gets together: Not on file     Attends Anglican service: Not on file     Active member of club or organization: Not on file     Attends meetings of clubs or organizations: Not on file      "Relationship status: Not on file     Intimate partner violence:     Fear of current or ex partner: Not on file     Emotionally abused: Not on file     Physically abused: Not on file     Forced sexual activity: Not on file   Other Topics Concern     Parent/sibling w/ CABG, MI or angioplasty before 65F 55M? Not Asked   Social History Narrative     Not on file       Family History   Problem Relation Age of Onset     Rheumatoid Arthritis Mother      LUNG DISEASE No family hx of        ROS Pulmonary    A complete ROS was otherwise negative except as noted in the HPI.  Vitals:    01/16/20 1313   BP: 131/79   BP Location: Right arm   Patient Position: Sitting   Cuff Size: Adult Large   Pulse: 71   Resp: 18   SpO2: 93%   Weight: 115 kg (253 lb 8 oz)   Height: 1.778 m (5' 10\")     Exam:   GENERAL APPEARANCE: Well developed, well nourished, alert, and in no apparent distress.  NECK: supple, no masses, no thyromegaly.  LYMPHATICS: No significant axillary, cervical, or supraclavicular nodes.  RESP: good air flow throughout, - minimal bibasilar crackles  CV: Normal S1, S2, regular rhythm, normal rate, no rub, no murmur,  no gallop, no LE edema.   ABDOMEN:  Bowel sounds normal, soft, nontender, no HSM or masses.   MS: extremities normal- no clubbing, no cyanosis.  PSYCH: mentation appears normal. and affect normal/bright  Results: I have reviewed all imaging, PFTs and other relavent tests, please see below for details, PFT and imaging results were reviewed with the patient.  PFTs: Normal spirometry with mild reduction in diffusing capacity.  Stable from previous.    Assessment and plan:    74-year-old male with mild interstitial lung abnormalities on CT scan with normal spirometry and only mild reduction in diffusing capacity.  At this point I think it is unlikely that he has a progressive fibrosing interstitial lung disease but we certainly need to continue to follow him.  I think his dyspnea is multifactorial certainly his " lungs could be playing a role he may have some cardiac contribution as well and I think there is some deconditioning and also significant obesity with a BMI of 37.5.  We did discuss weight loss and also pulmonary rehab he is interested in the pulmonary rehab however is planning to leave for 3 months where he spends the angeles in [place] so we will consider this when he returns.  Otherwise I will plan to see him back in 6 months I am going to get an echocardiogram prior to the next visit as this has not been done by his cardiologist in several years that I can tell.      CBC   Recent Labs   Lab Test 10/10/19  1519 04/25/13  1004   RBC 4.01* 4.85   HGB 12.3* 15.0   HCT 37.2* 44.9    301       Basic Metabolic Panel  Recent Labs   Lab Test 10/10/19  1519 09/29/19 06/20/19  0924     --   --  137   POTASSIUM 3.8 4.1   < > 4.0   CHLORIDE 104  --   --  104   CO2 24  --   --  24   BUN 29  --   --  28   GLC 95 110*   < > 102*   GABE 9.3  --   --  8.8    < > = values in this interval not displayed.       INR  No results for input(s): INR in the last 64024 hours.    PFT  PFT Latest Ref Rng & Units 1/14/2020   FVC L 3.46   FEV1 L 2.80   FVC% % 87   FEV1% % 94           CC:          Sincerely,        David Morris Perlman, MD

## 2020-01-16 NOTE — PROGRESS NOTES
Reason for Visit  Sylvie Harvey is a 74 year old year old male who is being seen for RECHECK (ILD follow up - PFT 01/14/20)    ILD HPI    Sylvie Harvey is a 74 old male with mild interstitial lung abnormalities on CT scan who is seen today for follow-up.  Not clear that he has progressive disease he initially was evaluated for dyspnea on exertion and high-resolution CT scan demonstrated mild peripheral interstitial infiltrates.  ILD panel was unremarkable.  He also had untreated sleep apnea that time and he has been following the sleep clinic and he uses his CPAP regularly although he dislikes it quite significantly.  Overall from a breathing standpoint he has not noted any changes still has significant dyspnea on exertion does not feel this has progressed.  Does have known coronary artery disease and had a follow-up with an outside cardiologist recently but this was not addressed.      Current Outpatient Medications   Medication     aspirin 81 MG tablet     atorvastatin (LIPITOR) 40 MG tablet     buPROPion (WELLBUTRIN XL) 300 MG 24 hr tablet     gabapentin (NEURONTIN) 400 MG capsule     hydrochlorothiazide (HYDRODIURIL) 25 MG tablet     losartan (COZAAR) 50 MG tablet     metoprolol tartrate (LOPRESSOR) 25 MG tablet     nitroglycerin (NITROSTAT) 0.4 MG sublingual tablet     ticagrelor (BRILINTA) 60 MG tablet     Current Facility-Administered Medications   Medication     lidocaine 1 % injection 2 mL     triamcinolone (KENALOG-40) injection 40 mg     Allergies   Allergen Reactions     Sulfa Drugs      Past Medical History:   Diagnosis Date     Alcohol abuse     Quit over 25 yrs ago     Arthritis      CKD (chronic kidney disease) stage 3, GFR 30-59 ml/min (H) 10/23/2018     Coronary artery disease involving native heart with angina pectoris, unspecified vessel or lesion type (H) 4/1/2016     Drug abuse (H)     Quit over 25 yrs ago     Headaches      Hyperlipidemia LDL goal <130 4/26/2013     Hypertension       Idiopathic peripheral neuropathy 10/23/2018     Major depressive disorder, recurrent episode, mild (H) 10/18/2016       Past Surgical History:   Procedure Laterality Date     APPENDECTOMY       COLONOSCOPY WITH CO2 INSUFFLATION N/A 2018    Procedure: COLONOSCOPY WITH CO2 INSUFFLATION;  Surgeon: Duane, William Charles, MD;  Location: [facility] OR     ORTHOPEDIC SURGERY Right     Scar over dorsum aspect of right wrist.      ORTHOPEDIC SURGERY Right     Knee     ORTHOPEDIC SURGERY Bilateral     CTS release     SURGICAL HISTORY OF -       Umbilical Hernia     SURGICAL HISTORY OF -       Coronary Stent       Social History     Socioeconomic History     Marital status:      Spouse name: Jenna     Number of children: 4     Years of education: 12     Highest education level: Not on file   Occupational History     Occupation: [employer]     Employer: OTHER     Comment: Part-time [employer]   Social Needs     Financial resource strain: Not on file     Food insecurity:     Worry: Not on file     Inability: Not on file     Transportation needs:     Medical: Not on file     Non-medical: Not on file   Tobacco Use     Smoking status: Former Smoker     Last attempt to quit: 1983     Years since quittin.7     Smokeless tobacco: Never Used   Substance and Sexual Activity     Alcohol use: No     Alcohol/week: 0.0 standard drinks     Comment: Quit 25 years ago ()     Drug use: No     Sexual activity: Yes     Partners: Female   Lifestyle     Physical activity:     Days per week: Not on file     Minutes per session: Not on file     Stress: Not on file   Relationships     Social connections:     Talks on phone: Not on file     Gets together: Not on file     Attends Cheondoism service: Not on file     Active member of club or organization: Not on file     Attends meetings of clubs or organizations: Not on file     Relationship status: Not on file     Intimate partner violence:     Fear of current or ex partner: Not on file  "    Emotionally abused: Not on file     Physically abused: Not on file     Forced sexual activity: Not on file   Other Topics Concern     Parent/sibling w/ CABG, MI or angioplasty before 65F 55M? Not Asked   Social History Narrative     Not on file       Family History   Problem Relation Age of Onset     Rheumatoid Arthritis Mother      LUNG DISEASE No family hx of        ROS Pulmonary    A complete ROS was otherwise negative except as noted in the HPI.  Vitals:    01/16/20 1313   BP: 131/79   BP Location: Right arm   Patient Position: Sitting   Cuff Size: Adult Large   Pulse: 71   Resp: 18   SpO2: 93%   Weight: 115 kg (253 lb 8 oz)   Height: 1.778 m (5' 10\")     Exam:   GENERAL APPEARANCE: Well developed, well nourished, alert, and in no apparent distress.  NECK: supple, no masses, no thyromegaly.  LYMPHATICS: No significant axillary, cervical, or supraclavicular nodes.  RESP: good air flow throughout, - minimal bibasilar crackles  CV: Normal S1, S2, regular rhythm, normal rate, no rub, no murmur,  no gallop, no LE edema.   ABDOMEN:  Bowel sounds normal, soft, nontender, no HSM or masses.   MS: extremities normal- no clubbing, no cyanosis.  PSYCH: mentation appears normal. and affect normal/bright  Results: I have reviewed all imaging, PFTs and other relavent tests, please see below for details, PFT and imaging results were reviewed with the patient.  PFTs: Normal spirometry with mild reduction in diffusing capacity.  Stable from previous.    Assessment and plan:    74-year-old male with mild interstitial lung abnormalities on CT scan with normal spirometry and only mild reduction in diffusing capacity.  At this point I think it is unlikely that he has a progressive fibrosing interstitial lung disease but we certainly need to continue to follow him.  I think his dyspnea is multifactorial certainly his lungs could be playing a role he may have some cardiac contribution as well and I think there is some " deconditioning and also significant obesity with a BMI of 37.5.  We did discuss weight loss and also pulmonary rehab he is interested in the pulmonary rehab however is planning to leave for 3 months where he spends the angeles in [place] so we will consider this when he returns.  Otherwise I will plan to see him back in 6 months I am going to get an echocardiogram prior to the next visit as this has not been done by his cardiologist in several years that I can tell.      CBC   Recent Labs   Lab Test 10/10/19  1519 04/25/13  1004   RBC 4.01* 4.85   HGB 12.3* 15.0   HCT 37.2* 44.9    301       Basic Metabolic Panel  Recent Labs   Lab Test 10/10/19  1519 09/29/19 06/20/19  0924     --   --  137   POTASSIUM 3.8 4.1   < > 4.0   CHLORIDE 104  --   --  104   CO2 24  --   --  24   BUN 29  --   --  28   GLC 95 110*   < > 102*   GABE 9.3  --   --  8.8    < > = values in this interval not displayed.       INR  No results for input(s): INR in the last 76157 hours.    PFT  PFT Latest Ref Rng & Units 1/14/2020   FVC L 3.46   FEV1 L 2.80   FVC% % 87   FEV1% % 94           CC:

## 2020-02-16 DIAGNOSIS — I10 HYPERTENSION GOAL BP (BLOOD PRESSURE) < 140/90: ICD-10-CM

## 2020-02-16 DIAGNOSIS — E78.5 HYPERLIPIDEMIA LDL GOAL <130: ICD-10-CM

## 2020-02-16 DIAGNOSIS — I25.119 CORONARY ARTERY DISEASE INVOLVING NATIVE HEART WITH ANGINA PECTORIS, UNSPECIFIED VESSEL OR LESION TYPE (H): ICD-10-CM

## 2020-02-18 RX ORDER — HYDROCHLOROTHIAZIDE 25 MG/1
TABLET ORAL
Qty: 90 TABLET | Refills: 0 | Status: SHIPPED | OUTPATIENT
Start: 2020-02-18 | End: 2020-05-05

## 2020-02-18 RX ORDER — ATORVASTATIN CALCIUM 40 MG/1
TABLET, FILM COATED ORAL
Qty: 90 TABLET | Refills: 0 | Status: SHIPPED | OUTPATIENT
Start: 2020-02-18 | End: 2020-05-05

## 2020-02-18 NOTE — TELEPHONE ENCOUNTER
"Routing refill request to provider for review/approval because:  Labs out of range:  Elevated serum creatinine    Last Written Prescription Date:  [facility] 6/4/19  Last Fill Quantity: 90,  # refills: 1   Last office visit: 10/10/2019 with prescribing provider:  Tala   Future Office Visit:  NONE      Requested Prescriptions   Pending Prescriptions Disp Refills     hydrochlorothiazide (HYDRODIURIL) 25 MG tablet [Pharmacy Med Name: HYDROCHLOROTHIAZIDE  25MG  TAB] 90 tablet 1     Sig: TAKE 1 TABLET BY MOUTH  DAILY       Diuretics (Including Combos) Protocol Failed - 2/16/2020  4:05 AM        Failed - Normal serum creatinine on file in past 12 months     Recent Labs   Lab Test 10/10/19  1519   CR 1.33*              Passed - Blood pressure under 140/90 in past 12 months     BP Readings from Last 3 Encounters:   01/16/20 131/79   12/17/19 124/62   12/10/19 133/83                 Passed - Recent (12 mo) or future (30 days) visit within the authorizing provider's specialty     Patient has had an office visit with the authorizing provider or a provider within the authorizing providers department within the previous 12 mos or has a future within next 30 days. See \"Patient Info\" tab in inbasket, or \"Choose Columns\" in Meds & Orders section of the refill encounter.              Passed - Medication is active on med list        Passed - Patient is age 18 or older        Passed - Normal serum potassium on file in past 12 months     Recent Labs   Lab Test 10/10/19  1519   POTASSIUM 3.8                    Passed - Normal serum sodium on file in past 12 months     Recent Labs   Lab Test 10/10/19  1519                 atorvastatin (LIPITOR) 40 MG tablet [Pharmacy Med Name: ATORVASTATIN  40MG  TAB] 90 tablet 3     Sig: TAKE 1 TABLET BY MOUTH  DAILY       Statins Protocol Passed - 2/16/2020  4:05 AM        Passed - LDL on file in past 12 months     Recent Labs   Lab Test 06/20/19  0924   LDL 44             Passed - No abnormal " "creatine kinase in past 12 months     Recent Labs   Lab Test 09/26/19  1104   CKT 65                Passed - Recent (12 mo) or future (30 days) visit within the authorizing provider's specialty     Patient has had an office visit with the authorizing provider or a provider within the authorizing providers department within the previous 12 mos or has a future within next 30 days. See \"Patient Info\" tab in inbasket, or \"Choose Columns\" in Meds & Orders section of the refill encounter.              Passed - Medication is active on med list        Passed - Patient is age 18 or older        SCOTTY DominiqueN, RN    "

## 2020-02-23 ENCOUNTER — HEALTH MAINTENANCE LETTER (OUTPATIENT)
Age: 75
End: 2020-02-23

## 2020-03-27 ENCOUNTER — DOCUMENTATION ONLY (OUTPATIENT)
Dept: SLEEP MEDICINE | Facility: CLINIC | Age: 75
End: 2020-03-27

## 2020-03-27 NOTE — PROGRESS NOTES
"6 month [facility] Recheck Visit     Diagnostic AHI:  20.8 HST      Assessment: Pt not meeting objective benchmarks for compliance, leak, and AHI. Pt seems to be using device most nights but not always for more than four hours.     Pt says he's still struggling to acclimate to PAP therapy, saying, \"It's a denis.\" Since his most recent pressure change he feels that the pressures are at a good setting for him.    He feels that overall his mask fits him well, but he finds he often ends up taking it off at night without realizing it. He mentioned having some congestion and sinus problems right now that make him unable to use the device. He uses nasal pillows and said his DME provider did not show him any other options besides various nasal pillows. He was told that sometimes people will have a ffm as a back up for times of congestion but he feels it would be too expensive for him to get a different mask, even with insurance. It was suggested that he could check out [employer] or [employer].com as well, if he didn't want to go through insurance.   Action plan:  pt to follow up per provider request       Device type: Auto-CPAP  PAP settings: CPAP min 7.0 cm  H20     CPAP max 9.0 cm  H20    95th% pressure 8.6 cm  H20   Objective measures: 14 day rolling measures      Compliance  57 %      Leak  30.72 lpm  last  upload      AHI 8.03   last  upload      Average number of minutes 265      Objective measure goal  Compliance   Goal >70%  Leak   Goal < 24 lpm  AHI  Goal < 5  Usage  Goal >240    Total time spent on accessing, reviewing and interpreting remote patient PAP therapy data:   10 minutes      Total time spent with direct patient communication :   10 minutes          "

## 2020-04-01 ENCOUNTER — TELEPHONE (OUTPATIENT)
Dept: FAMILY MEDICINE | Facility: CLINIC | Age: 75
End: 2020-04-01

## 2020-04-01 DIAGNOSIS — H93.13 TINNITUS, BILATERAL: Primary | ICD-10-CM

## 2020-04-01 NOTE — TELEPHONE ENCOUNTER
"I spoke with Sylvie.  Gave him Jesse Edgar PA-C's instructions as per below.  Also gave him scheduling number for ENT to make appointment when he eventually comes home for the tinnitus.  He already uses a CPAP so states \"have enough noise\".  Akua Pino RN    "

## 2020-04-01 NOTE — TELEPHONE ENCOUNTER
Patient states he has having tinnitus really bad and wonders if there is something Jesse can give him.  Please call.    Thank you.

## 2020-04-01 NOTE — TELEPHONE ENCOUNTER
"No tx. Can follow up with ENT when he gets back..   Consider \"sleep machine\", white noise for sleeping.     Jesse Edgar PA-C    "

## 2020-04-01 NOTE — TELEPHONE ENCOUNTER
"Patient states he is having \"ringing in both ears\".   has been going on for a few years but has gotten worse recently.   has never talked with Jesse Edgar PA-C about it in past.  In past had been more intermittent. He states now is more constant.  He states that it is present all day; if wakes up during night he can hear it.  No ear pain. No drainage from ears.   No noted hearing loss. No symptoms of illness. He worked as a  for many years; did use ear plugs more frequently in later years.  Not a  now for 23 years. Checks bp at home on regular basis.; usually low 130's to 70's.  Takes his bp medications as prescribed.  He states no history of wax build up in ears; never puts anything in ears; no toothpicks, qtips, etc.  Does not use earwax removal OTC products.  ? If the tinnitus may be drug related?    [employer] [facility].  Has been in [place] since January; not planning to be home for a few more weeks.    Please advise if ok for phone visit ? INDER Fatima  "

## 2020-04-13 ENCOUNTER — OFFICE VISIT (OUTPATIENT)
Dept: OTOLARYNGOLOGY | Facility: CLINIC | Age: 75
End: 2020-04-13
Attending: PHYSICIAN ASSISTANT
Payer: COMMERCIAL

## 2020-04-13 VITALS
SYSTOLIC BLOOD PRESSURE: 146 MMHG | OXYGEN SATURATION: 89 % | TEMPERATURE: 97.5 F | DIASTOLIC BLOOD PRESSURE: 82 MMHG | HEART RATE: 87 BPM

## 2020-04-13 DIAGNOSIS — H93.13 TINNITUS, BILATERAL: ICD-10-CM

## 2020-04-13 DIAGNOSIS — H90.3 SENSORINEURAL HEARING LOSS (SNHL) OF BOTH EARS: Primary | ICD-10-CM

## 2020-04-13 PROCEDURE — 99203 OFFICE O/P NEW LOW 30 MIN: CPT | Performed by: OTOLARYNGOLOGY

## 2020-04-13 NOTE — LETTER
4/13/2020         RE: Slyvie Harvey  [address] [facility] [address]        Dear Colleague,    Thank you for referring your patient, Sylvie Harvey, to the [facility]. Please see a copy of my visit note below.    History of Present Illness - Sylvie Harvey is a 75 year old male here to see me for the first time for bilateral tinnitus and hearing issues.    He tells me that he has had tinnitus in both ears for many years.  It has been tolerable, but it has gotten to the point where it is extremely distracting.  He does not feel any issues understanding people.    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  Otherwise, no noisy activities, no  service, no history of any ear disease or ear surgery.    Past Medical History -   Patient Active Problem List   Diagnosis     Advanced directives, counseling/discussion     Hypertension goal BP (blood pressure) < 140/90     Hyperlipidemia LDL goal <130     Pain in shoulder     Plantar fasciitis     Medial meniscus tear     Coronary artery disease involving native heart with angina pectoris, unspecified vessel or lesion type (H)     Arthritis     Major depressive disorder, recurrent episode, mild (H)     Idiopathic peripheral neuropathy     CKD (chronic kidney disease) stage 3, GFR 30-59 ml/min (H)     SOB (shortness of breath)     Asbestos exposure     Snores     Obesity (BMI 35.0-39.9) with comorbidity (H)     KIMI (obstructive sleep apnea)       Current Medications -   Current Outpatient Medications:      aspirin 81 MG tablet, Take 1 tablet by mouth daily., Disp: , Rfl:      ATORVASTATIN 40 MG PO tablet, TAKE 1 TABLET BY MOUTH  DAILY, Disp: 90 tablet, Rfl: 0     buPROPion (WELLBUTRIN XL) 300 MG 24 hr tablet, TAKE 1 TABLET BY MOUTH  EVERY MORNING, Disp: 90 tablet, Rfl: 1     gabapentin (NEURONTIN) 400 MG capsule, TAKE 1 CAPSULE BY MOUTH 3  TIMES DAILY, Disp: 270 capsule, Rfl: 1      HYDROCHLOROTHIAZIDE 25 MG PO tablet, TAKE 1 TABLET BY MOUTH  DAILY, Disp: 90 tablet, Rfl: 0     losartan (COZAAR) 50 MG tablet, TAKE 1 TABLET BY MOUTH  DAILY, Disp: 90 tablet, Rfl: 1     metoprolol tartrate (LOPRESSOR) 25 MG tablet, TAKE 1 TABLET BY MOUTH TWO  TIMES DAILY, Disp: 180 tablet, Rfl: 1     nitroglycerin (NITROSTAT) 0.4 MG sublingual tablet, Place 1 tablet (0.4 mg) under the tongue every 5 minutes as needed for chest pain (may repeat x2), Disp: 10 tablet, Rfl: 3     ticagrelor (BRILINTA) 60 MG tablet, Take 60 mg by mouth 2 times daily, Disp: , Rfl:     Current Facility-Administered Medications:      lidocaine 1 % injection 2 mL, 2 mL, , , Jeevan Diego, , 2 mL at 19 1036     triamcinolone (KENALOG-40) injection 40 mg, 40 mg, , , Jeevan Diego DO, 40 mg at 19 1036    Allergies -   Allergies   Allergen Reactions     Sulfa Drugs        Social History -   Social History     Socioeconomic History     Marital status:      Spouse name: Jenna     Number of children: 4     Years of education: 12     Highest education level: Not on file   Occupational History     Occupation: [employer]     Employer: OTHER     Comment: Part-time [employer]ances[employer]   Social Needs     Financial resource strain: Not on file     Food insecurity     Worry: Not on file     Inability: Not on file     Transportation needs     Medical: Not on file     Non-medical: Not on file   Tobacco Use     Smoking status: Former Smoker     Last attempt to quit: 1983     Years since quittin.0     Smokeless tobacco: Never Used   Substance and Sexual Activity     Alcohol use: No     Alcohol/week: 0.0 standard drinks     Comment: Quit 25 years ago (2013)     Drug use: No     Sexual activity: Yes     Partners: Female   Lifestyle     Physical activity     Days per week: Not on file     Minutes per session: Not on file     Stress: Not on file   Relationships     Social connections     Talks on phone: Not on file     Gets  together: Not on file     Attends Buddhist service: Not on file     Active member of club or organization: Not on file     Attends meetings of clubs or organizations: Not on file     Relationship status: Not on file     Intimate partner violence     Fear of current or ex partner: Not on file     Emotionally abused: Not on file     Physically abused: Not on file     Forced sexual activity: Not on file   Other Topics Concern     Parent/sibling w/ CABG, MI or angioplasty before 65F 55M? Not Asked   Social History Narrative     Not on file       Family History -   Family History   Problem Relation Age of Onset     Rheumatoid Arthritis Mother      LUNG DISEASE No family hx of        Review of Systems - As per HPI and PMHx, otherwise 10+ system review of the head and neck, and general constitution is negative.    Physical Exam  BP (!) 146/82 (BP Location: Left arm, Patient Position: Sitting, Cuff Size: Adult Large)   Pulse 87   Temp 97.5  F (36.4  C) (Oral)   SpO2 (!) 89%     General - The patient is well nourished and well developed, and appears to have good nutritional status.  Alert and oriented to person and place, answers questions and cooperates with examination appropriately.   Head and Face - Normocephalic and atraumatic, with no gross asymmetry noted of the contour of the facial features.  The facial nerve is intact, with strong symmetric movements.  Voice and Breathing - The patient was breathing comfortably without the use of accessory muscles. There was no wheezing, stridor, or stertor.  The patients voice was clear and strong, and had appropriate pitch and quality.  Ears - The tympanic membranes are normal in appearance, bony landmarks are intact.  No retraction, perforation, or masses.  No fluid or purulence was seen in the external canal or the middle ear. No evidence of infection of the middle ear or external canal, cerumen was normal in appearance.  Eyes - Extraocular movements intact, and the pupils  were reactive to light.  Sclera were not icteric or injected, conjunctiva were pink and moist.  Mouth - Examination of the oral cavity showed pink, healthy oral mucosa. No lesions or ulcerations noted.  The tongue was mobile and midline, and the dentition were in good condition.    Throat - The walls of the oropharynx were smooth, pink, moist, symmetric, and had no lesions or ulcerations.  The tonsillar pillars and soft palate were symmetric.  The uvula was midline on elevation.    Neck - Normal midline excursion of the laryngotracheal complex during swallowing.  Full range of motion on passive movement.  Palpation of the occipital, submental, submandibular, internal jugular chain, and supraclavicular nodes did not demonstrate any abnormal lymph nodes or masses.  The carotid pulse was palpable bilaterally.  Palpation of the thyroid was soft and smooth, with no nodules or goiter appreciated.  The trachea was mobile and midline.  Nose - External contour is symmetric, no gross deflection or scars.  Nasal mucosa is pink and moist with no abnormal mucus.  The septum was midline and non-obstructive, turbinates of normal size and position.  No polyps, masses, or purulence noted on examination.    Audiologic Studies - An audiogram and tympanogram were performed today as part of the evaluation and personally reviewed. The tympanogram shows a normal Type A curve, with normal canal volume and middle ear pressure.  There is no sign of eustachian tube dysfunction or middle ear effusion.  The audiogram shows a deep noise notch down to 50dB thresholds bilaterally centered at 3-4000Hz.  No conductive hearing loss.    A/P - Sylvie Harvey is a 75 year old male  (H90.3) Sensorineural hearing loss (SNHL) of both ears  (primary encounter diagnosis)  (H93.13) Tinnitus, bilateral    We spent the remainder of today's visit discussing the current leading theory on tinnitus, in that it originates from the central nervous system itself,  similar to Phantom Limb Syndrome.  Also discussed were steps that can be taken to mask the noise, such as a low volume de-tuned radio, a fan in the background, and hearing aids.  Correlation with stress, anxiety, depression, and high blood pressure were also discussed.  The patient was also cautioned on the numerous expensive non-pharmaceutical options that are advertised, and have no proven benefit.    The patient will follow up as necessary for worsening symptoms, changes in symptoms, or signs of infection.  I have also recommended yearly audiograms, and consideration of a hearing aid evaluation.      Again, thank you for allowing me to participate in the care of your patient.        Sincerely,        Sin Mccarty MD

## 2020-04-13 NOTE — PROGRESS NOTES
History of Present Illness - Sylvie Harvey is a 75 year old male here to see me for the first time for bilateral tinnitus and hearing issues.    He tells me that he has had tinnitus in both ears for many years.  It has been tolerable, but it has gotten to the point where it is extremely distracting.  He does not feel any issues understanding people.    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  Otherwise, no noisy activities, no  service, no history of any ear disease or ear surgery.    Past Medical History -   Patient Active Problem List   Diagnosis     Advanced directives, counseling/discussion     Hypertension goal BP (blood pressure) < 140/90     Hyperlipidemia LDL goal <130     Pain in shoulder     Plantar fasciitis     Medial meniscus tear     Coronary artery disease involving native heart with angina pectoris, unspecified vessel or lesion type (H)     Arthritis     Major depressive disorder, recurrent episode, mild (H)     Idiopathic peripheral neuropathy     CKD (chronic kidney disease) stage 3, GFR 30-59 ml/min (H)     SOB (shortness of breath)     Asbestos exposure     Snores     Obesity (BMI 35.0-39.9) with comorbidity (H)     KIMI (obstructive sleep apnea)       Current Medications -   Current Outpatient Medications:      aspirin 81 MG tablet, Take 1 tablet by mouth daily., Disp: , Rfl:      ATORVASTATIN 40 MG PO tablet, TAKE 1 TABLET BY MOUTH  DAILY, Disp: 90 tablet, Rfl: 0     buPROPion (WELLBUTRIN XL) 300 MG 24 hr tablet, TAKE 1 TABLET BY MOUTH  EVERY MORNING, Disp: 90 tablet, Rfl: 1     gabapentin (NEURONTIN) 400 MG capsule, TAKE 1 CAPSULE BY MOUTH 3  TIMES DAILY, Disp: 270 capsule, Rfl: 1     HYDROCHLOROTHIAZIDE 25 MG PO tablet, TAKE 1 TABLET BY MOUTH  DAILY, Disp: 90 tablet, Rfl: 0     losartan (COZAAR) 50 MG tablet, TAKE 1 TABLET BY MOUTH  DAILY, Disp: 90 tablet, Rfl: 1     metoprolol tartrate (LOPRESSOR) 25 MG tablet, TAKE 1 TABLET BY MOUTH TWO  TIMES  DAILY, Disp: 180 tablet, Rfl: 1     nitroglycerin (NITROSTAT) 0.4 MG sublingual tablet, Place 1 tablet (0.4 mg) under the tongue every 5 minutes as needed for chest pain (may repeat x2), Disp: 10 tablet, Rfl: 3     ticagrelor (BRILINTA) 60 MG tablet, Take 60 mg by mouth 2 times daily, Disp: , Rfl:     Current Facility-Administered Medications:      lidocaine 1 % injection 2 mL, 2 mL, , , JonnyenholJeevan cevallos, DO, 2 mL at 19 1036     triamcinolone (KENALOG-40) injection 40 mg, 40 mg, , , JonnyenholzJeevan, DO, 40 mg at 19 1036    Allergies -   Allergies   Allergen Reactions     Sulfa Drugs        Social History -   Social History     Socioeconomic History     Marital status:      Spouse name: Jenna     Number of children: 4     Years of education: 12     Highest education level: Not on file   Occupational History     Occupation: [employer]     Employer: OTHER     Comment: Part-time [employer]   Social Needs     Financial resource strain: Not on file     Food insecurity     Worry: Not on file     Inability: Not on file     Transportation needs     Medical: Not on file     Non-medical: Not on file   Tobacco Use     Smoking status: Former Smoker     Last attempt to quit: 1983     Years since quittin.0     Smokeless tobacco: Never Used   Substance and Sexual Activity     Alcohol use: No     Alcohol/week: 0.0 standard drinks     Comment: Quit 25 years ago (2013)     Drug use: No     Sexual activity: Yes     Partners: Female   Lifestyle     Physical activity     Days per week: Not on file     Minutes per session: Not on file     Stress: Not on file   Relationships     Social connections     Talks on phone: Not on file     Gets together: Not on file     Attends Faith service: Not on file     Active member of club or organization: Not on file     Attends meetings of clubs or organizations: Not on file     Relationship status: Not on file     Intimate partner violence     Fear of current or ex  partner: Not on file     Emotionally abused: Not on file     Physically abused: Not on file     Forced sexual activity: Not on file   Other Topics Concern     Parent/sibling w/ CABG, MI or angioplasty before 65F 55M? Not Asked   Social History Narrative     Not on file       Family History -   Family History   Problem Relation Age of Onset     Rheumatoid Arthritis Mother      LUNG DISEASE No family hx of        Review of Systems - As per HPI and PMHx, otherwise 10+ system review of the head and neck, and general constitution is negative.    Physical Exam  BP (!) 146/82 (BP Location: Left arm, Patient Position: Sitting, Cuff Size: Adult Large)   Pulse 87   Temp 97.5  F (36.4  C) (Oral)   SpO2 (!) 89%     General - The patient is well nourished and well developed, and appears to have good nutritional status.  Alert and oriented to person and place, answers questions and cooperates with examination appropriately.   Head and Face - Normocephalic and atraumatic, with no gross asymmetry noted of the contour of the facial features.  The facial nerve is intact, with strong symmetric movements.  Voice and Breathing - The patient was breathing comfortably without the use of accessory muscles. There was no wheezing, stridor, or stertor.  The patients voice was clear and strong, and had appropriate pitch and quality.  Ears - The tympanic membranes are normal in appearance, bony landmarks are intact.  No retraction, perforation, or masses.  No fluid or purulence was seen in the external canal or the middle ear. No evidence of infection of the middle ear or external canal, cerumen was normal in appearance.  Eyes - Extraocular movements intact, and the pupils were reactive to light.  Sclera were not icteric or injected, conjunctiva were pink and moist.  Mouth - Examination of the oral cavity showed pink, healthy oral mucosa. No lesions or ulcerations noted.  The tongue was mobile and midline, and the dentition were in good  condition.    Throat - The walls of the oropharynx were smooth, pink, moist, symmetric, and had no lesions or ulcerations.  The tonsillar pillars and soft palate were symmetric.  The uvula was midline on elevation.    Neck - Normal midline excursion of the laryngotracheal complex during swallowing.  Full range of motion on passive movement.  Palpation of the occipital, submental, submandibular, internal jugular chain, and supraclavicular nodes did not demonstrate any abnormal lymph nodes or masses.  The carotid pulse was palpable bilaterally.  Palpation of the thyroid was soft and smooth, with no nodules or goiter appreciated.  The trachea was mobile and midline.  Nose - External contour is symmetric, no gross deflection or scars.  Nasal mucosa is pink and moist with no abnormal mucus.  The septum was midline and non-obstructive, turbinates of normal size and position.  No polyps, masses, or purulence noted on examination.    Audiologic Studies - An audiogram and tympanogram were performed today as part of the evaluation and personally reviewed. The tympanogram shows a normal Type A curve, with normal canal volume and middle ear pressure.  There is no sign of eustachian tube dysfunction or middle ear effusion.  The audiogram shows a deep noise notch down to 50dB thresholds bilaterally centered at 3-4000Hz.  No conductive hearing loss.    A/P - Sylvie Harvey is a 75 year old male  (H90.3) Sensorineural hearing loss (SNHL) of both ears  (primary encounter diagnosis)  (H93.13) Tinnitus, bilateral    We spent the remainder of today's visit discussing the current leading theory on tinnitus, in that it originates from the central nervous system itself, similar to Phantom Limb Syndrome.  Also discussed were steps that can be taken to mask the noise, such as a low volume de-tuned radio, a fan in the background, and hearing aids.  Correlation with stress, anxiety, depression, and high blood pressure were also discussed.   The patient was also cautioned on the numerous expensive non-pharmaceutical options that are advertised, and have no proven benefit.    The patient will follow up as necessary for worsening symptoms, changes in symptoms, or signs of infection.  I have also recommended yearly audiograms, and consideration of a hearing aid evaluation.

## 2020-04-23 VITALS — BODY MASS INDEX: 36.36 KG/M2 | HEIGHT: 70 IN | WEIGHT: 254 LBS

## 2020-04-23 ASSESSMENT — MIFFLIN-ST. JEOR: SCORE: 1893.39

## 2020-04-24 ENCOUNTER — VIRTUAL VISIT (OUTPATIENT)
Dept: SLEEP MEDICINE | Facility: CLINIC | Age: 75
End: 2020-04-24
Payer: COMMERCIAL

## 2020-04-24 DIAGNOSIS — G47.33 OSA (OBSTRUCTIVE SLEEP APNEA): ICD-10-CM

## 2020-05-04 DIAGNOSIS — G60.9 IDIOPATHIC PERIPHERAL NEUROPATHY: ICD-10-CM

## 2020-05-04 DIAGNOSIS — F33.0 MAJOR DEPRESSIVE DISORDER, RECURRENT EPISODE, MILD (H): ICD-10-CM

## 2020-05-04 DIAGNOSIS — E78.5 HYPERLIPIDEMIA LDL GOAL <130: ICD-10-CM

## 2020-05-04 DIAGNOSIS — I25.119 CORONARY ARTERY DISEASE INVOLVING NATIVE HEART WITH ANGINA PECTORIS, UNSPECIFIED VESSEL OR LESION TYPE (H): ICD-10-CM

## 2020-05-04 DIAGNOSIS — I10 HYPERTENSION GOAL BP (BLOOD PRESSURE) < 140/90: ICD-10-CM

## 2020-05-04 NOTE — TELEPHONE ENCOUNTER
"Requested Prescriptions   Pending Prescriptions Disp Refills     buPROPion (WELLBUTRIN XL) 300 MG 24 hr tablet [Pharmacy Med Name: BUPROPION  300MG  TAB  XL 24 HR] 90 tablet 1     Sig: TAKE 1 TABLET BY MOUTH  EVERY MORNING       SSRIs Protocol Failed - 5/4/2020  1:49 PM        Failed - PHQ-9 score less than 5 in past 6 months     Please review last PHQ-9 score.           Failed - Recent (6 mo) or future (30 days) visit within the authorizing provider's specialty     Patient had office visit in the last 6 months or has a visit in the next 30 days with authorizing provider or within the authorizing provider's specialty.  See \"Patient Info\" tab in inbasket, or \"Choose Columns\" in Meds & Orders section of the refill encounter.            Passed - Medication is Bupropion     If the medication is Bupropion (Wellbutrin), and the patient is taking for smoking cessation; OK to refill.          Passed - Medication is active on med list        Passed - Patient is age 18 or older           atorvastatin (LIPITOR) 40 MG tablet [Pharmacy Med Name: ATORVASTATIN  40MG  TAB] 90 tablet 0     Sig: TAKE 1 TABLET BY MOUTH  DAILY       Statins Protocol Passed - 5/4/2020  1:49 PM        Passed - LDL on file in past 12 months     Recent Labs   Lab Test 06/20/19  0924   LDL 44             Passed - No abnormal creatine kinase in past 12 months     Recent Labs   Lab Test 09/26/19  1104   CKT 65                Passed - Recent (12 mo) or future (30 days) visit within the authorizing provider's specialty     Patient has had an office visit with the authorizing provider or a provider within the authorizing providers department within the previous 12 mos or has a future within next 30 days. See \"Patient Info\" tab in inbasket, or \"Choose Columns\" in Meds & Orders section of the refill encounter.              Passed - Medication is active on med list        Passed - Patient is age 18 or older           gabapentin (NEURONTIN) 400 MG capsule [Pharmacy " "Med Name: GABAPENTIN  400MG  CAP] 270 capsule 1     Sig: TAKE 1 CAPSULE BY MOUTH 3  TIMES DAILY       There is no refill protocol information for this order        hydrochlorothiazide (HYDRODIURIL) 25 MG tablet [Pharmacy Med Name: HYDROCHLOROTHIAZIDE  25MG  TAB] 90 tablet 0     Sig: TAKE 1 TABLET BY MOUTH  DAILY       Diuretics (Including Combos) Protocol Failed - 5/4/2020  1:49 PM        Failed - Blood pressure under 140/90 in past 12 months     BP Readings from Last 3 Encounters:   04/13/20 (!) 146/82   01/16/20 131/79   12/17/19 124/62                 Failed - Normal serum creatinine on file in past 12 months     Recent Labs   Lab Test 10/10/19  1519   CR 1.33*              Passed - Recent (12 mo) or future (30 days) visit within the authorizing provider's specialty     Patient has had an office visit with the authorizing provider or a provider within the authorizing providers department within the previous 12 mos or has a future within next 30 days. See \"Patient Info\" tab in inbasket, or \"Choose Columns\" in Meds & Orders section of the refill encounter.              Passed - Medication is active on med list        Passed - Patient is age 18 or older        Passed - Normal serum potassium on file in past 12 months     Recent Labs   Lab Test 10/10/19  1519   POTASSIUM 3.8                    Passed - Normal serum sodium on file in past 12 months     Recent Labs   Lab Test 10/10/19  1519                      "

## 2020-05-04 NOTE — LETTER
May 5, 2020    Sylvie Harvey  [address] [facility] [address]    Dear Sylvie,       We recently received a refill request for BuPROPion (WELLBUTRIN XL), Atorvastatin (LIPITOR), Gabapentin (NEURONTIN) & Hydrochlorothiazide (HYDRODIURIL)   We have refilled this for a one time supply only because you are due for a:    Recommend Virtual Visit (Phone visit or Video visit)  With fasting labs and ancillary blood pressure appointment.    Please schedule this lab appointment 4-5 days prior to the office visit.     Please call at your earliest convenience so that there will not be a delay with your future refills.          Thank you,   Your [facility] Team/[facility]  [phone]

## 2020-05-05 RX ORDER — ATORVASTATIN CALCIUM 40 MG/1
TABLET, FILM COATED ORAL
Qty: 90 TABLET | Refills: 0 | Status: SHIPPED | OUTPATIENT
Start: 2020-05-05 | End: 2020-08-11

## 2020-05-05 RX ORDER — HYDROCHLOROTHIAZIDE 25 MG/1
TABLET ORAL
Qty: 90 TABLET | Refills: 0 | Status: SHIPPED | OUTPATIENT
Start: 2020-05-05 | End: 2020-08-11

## 2020-05-05 RX ORDER — GABAPENTIN 400 MG/1
CAPSULE ORAL
Qty: 270 CAPSULE | Refills: 1 | Status: SHIPPED | OUTPATIENT
Start: 2020-05-05 | End: 2020-11-09

## 2020-05-05 RX ORDER — BUPROPION HYDROCHLORIDE 300 MG/1
TABLET ORAL
Qty: 90 TABLET | Refills: 1 | Status: SHIPPED | OUTPATIENT
Start: 2020-05-05 | End: 2021-03-05

## 2020-05-05 NOTE — TELEPHONE ENCOUNTER
Recommend Virtual Visit (Phone visit,or  Video visit)  With fasting labs and ancillary blood pressure.    Jesse Edgar PA-C

## 2020-06-18 ENCOUNTER — DOCUMENTATION ONLY (OUTPATIENT)
Dept: LAB | Facility: CLINIC | Age: 75
End: 2020-06-18

## 2020-06-18 DIAGNOSIS — Z12.5 SCREENING PSA (PROSTATE SPECIFIC ANTIGEN): ICD-10-CM

## 2020-06-18 DIAGNOSIS — I25.119 CORONARY ARTERY DISEASE INVOLVING NATIVE HEART WITH ANGINA PECTORIS, UNSPECIFIED VESSEL OR LESION TYPE (H): ICD-10-CM

## 2020-06-18 DIAGNOSIS — E78.5 HYPERLIPIDEMIA LDL GOAL <130: ICD-10-CM

## 2020-06-18 DIAGNOSIS — I10 HYPERTENSION GOAL BP (BLOOD PRESSURE) < 140/90: ICD-10-CM

## 2020-06-18 DIAGNOSIS — Z12.5 SCREENING PSA (PROSTATE SPECIFIC ANTIGEN): Primary | ICD-10-CM

## 2020-06-18 LAB
ALBUMIN SERPL-MCNC: 4 G/DL (ref 3.4–5)
ALP SERPL-CCNC: 71 U/L (ref 40–150)
ALT SERPL W P-5'-P-CCNC: 25 U/L (ref 0–70)
ANION GAP SERPL CALCULATED.3IONS-SCNC: 8 MMOL/L (ref 3–14)
AST SERPL W P-5'-P-CCNC: 13 U/L (ref 0–45)
BILIRUB SERPL-MCNC: 0.5 MG/DL (ref 0.2–1.3)
BUN SERPL-MCNC: 30 MG/DL (ref 7–30)
CALCIUM SERPL-MCNC: 9.3 MG/DL (ref 8.5–10.1)
CHLORIDE SERPL-SCNC: 106 MMOL/L (ref 94–109)
CHOLEST SERPL-MCNC: 139 MG/DL
CO2 SERPL-SCNC: 23 MMOL/L (ref 20–32)
CREAT SERPL-MCNC: 1.79 MG/DL (ref 0.66–1.25)
GFR SERPL CREATININE-BSD FRML MDRD: 36 ML/MIN/{1.73_M2}
GLUCOSE SERPL-MCNC: 102 MG/DL (ref 70–99)
HDLC SERPL-MCNC: 37 MG/DL
LDLC SERPL CALC-MCNC: 45 MG/DL
NONHDLC SERPL-MCNC: 102 MG/DL
POTASSIUM SERPL-SCNC: 4.6 MMOL/L (ref 3.4–5.3)
PROT SERPL-MCNC: 8.7 G/DL (ref 6.8–8.8)
SODIUM SERPL-SCNC: 137 MMOL/L (ref 133–144)
TRIGL SERPL-MCNC: 284 MG/DL

## 2020-06-18 PROCEDURE — 80053 COMPREHEN METABOLIC PANEL: CPT | Performed by: PHYSICIAN ASSISTANT

## 2020-06-18 PROCEDURE — 80061 LIPID PANEL: CPT | Performed by: PHYSICIAN ASSISTANT

## 2020-06-18 PROCEDURE — 36415 COLL VENOUS BLD VENIPUNCTURE: CPT | Performed by: PHYSICIAN ASSISTANT

## 2020-06-18 PROCEDURE — G0103 PSA SCREENING: HCPCS | Performed by: PHYSICIAN ASSISTANT

## 2020-06-18 NOTE — PROGRESS NOTES
Patient was wondering if he could get his PSA checked as well.     Please review and place future order.    Thank you  Lab

## 2020-06-19 LAB — PSA SERPL-ACNC: 2.33 UG/L (ref 0–4)

## 2020-06-30 ENCOUNTER — VIRTUAL VISIT (OUTPATIENT)
Dept: FAMILY MEDICINE | Facility: CLINIC | Age: 75
End: 2020-06-30
Payer: COMMERCIAL

## 2020-06-30 DIAGNOSIS — I25.119 CORONARY ARTERY DISEASE INVOLVING NATIVE HEART WITH ANGINA PECTORIS, UNSPECIFIED VESSEL OR LESION TYPE (H): ICD-10-CM

## 2020-06-30 DIAGNOSIS — F33.0 MAJOR DEPRESSIVE DISORDER, RECURRENT EPISODE, MILD (H): ICD-10-CM

## 2020-06-30 DIAGNOSIS — E78.5 HYPERLIPIDEMIA LDL GOAL <130: ICD-10-CM

## 2020-06-30 DIAGNOSIS — G60.9 IDIOPATHIC PERIPHERAL NEUROPATHY: ICD-10-CM

## 2020-06-30 DIAGNOSIS — I10 HYPERTENSION GOAL BP (BLOOD PRESSURE) < 140/90: ICD-10-CM

## 2020-06-30 PROCEDURE — 99214 OFFICE O/P EST MOD 30 MIN: CPT | Mod: 95 | Performed by: PHYSICIAN ASSISTANT

## 2020-06-30 NOTE — Clinical Note
Can you help can be some guidance on this patient.  His creatinine is increased.  We talked about pushing fluids.  Not sure how should adjust his blood pressure medications as of yet.  What you think?    Thanks  Jesse Edgar PA-C

## 2020-06-30 NOTE — PROGRESS NOTES
"Sylvie Harvey is a 75 year old male who is being evaluated via a billable video visit.      The patient has been notified of following:     \"This video visit will be conducted via a call between you and your physician/provider. We have found that certain health care needs can be provided without the need for an in-person physical exam.  This service lets us provide the care you need with a video conversation.  If a prescription is necessary we can send it directly to your pharmacy.  If lab work is needed we can place an order for that and you can then stop by our lab to have the test done at a later time.    Video visits are billed at different rates depending on your insurance coverage.  Please reach out to your insurance provider with any questions.    If during the course of the call the physician/provider feels a video visit is not appropriate, you will not be charged for this service.\"    Patient has given verbal consent for Video visit? Yes  How would you like to obtain your AVS? {AVS Preference:848757}  Patient would like the video invitation sent by: {video visit invitation:865252}  Will anyone else be joining your video visit? {:821796}  {If patient encounters technical issues they should call [phone] :183331}    Subjective     Sylvie Harvey is a 75 year old male who presents today via video visit for the following health issues:    HPI  {SUPERLIST (Optional):848203}  {PEDS Chronic and Acute Problems (Optional):800779}     Video Start Time: {video visit start/end time for provider to select:195737}    {additonal problems for provider to add (Optional):617073}    {HIST REVIEW/ LINKS 2 (Optional):504012}    Reviewed and updated as needed this visit by Provider         Review of Systems   {ROS COMP (Optional):042391}      Objective             Physical Exam     {video visit exam brief selected:075946::\"GENERAL: Healthy, alert and no distress\",\"EYES: Eyes grossly normal to inspection.  No " "discharge or erythema, or obvious scleral/conjunctival abnormalities.\",\"RESP: No audible wheeze, cough, or visible cyanosis.  No visible retractions or increased work of breathing.  \",\"SKIN: Visible skin clear. No significant rash, abnormal pigmentation or lesions.\",\"NEURO: Cranial nerves grossly intact.  Mentation and speech appropriate for age.\",\"PSYCH: Mentation appears normal, affect normal/bright, judgement and insight intact, normal speech and appearance well-groomed.\"}      {Diagnostic Test Results (Optional):439473::\"Diagnostic Test Results:\",\"Labs reviewed in Epic\"}        {PROVIDER CHARTING PREFERENCE:245329}      Video-Visit Details    Type of service:  Video Visit    Video End Time:{video visit start/end time for provider to select:306597}    Originating Location (pt. Location): {video visit patient location:333740::\"Home\"}    Distant Location (provider location):  [facility]     Platform used for Video Visit: {Virtual Visit Platforms:686096::\"[employer]\"}    No follow-ups on file.       {signature options:087216}        "

## 2020-06-30 NOTE — Clinical Note
Please let patient know I would like to see him in clinic in 4 weeks for recheck blood pressure.    Jesse Edgar PA-C

## 2020-06-30 NOTE — PROGRESS NOTES
"Sylvie Harvey is a 75 year old male who is being evaluated via a billable telephone visit.      The patient has been notified of following:     \"This telephone visit will be conducted via a call between you and your physician/provider. We have found that certain health care needs can be provided without the need for a physical exam.  This service lets us provide the care you need with a short phone conversation.  If a prescription is necessary we can send it directly to your pharmacy.  If lab work is needed we can place an order for that and you can then stop by our lab to have the test done at a later time.    Telephone visits are billed at different rates depending on your insurance coverage. During this emergency period, for some insurers they may be billed the same as an in-person visit.  Please reach out to your insurance provider with any questions.    If during the course of the call the physician/provider feels a telephone visit is not appropriate, you will not be charged for this service.\"    Patient has given verbal consent for Telephone visit?  Yes    What phone number would you like to be contacted at? [phone]    How would you like to obtain your AVS? Mail a copy    Subjective     Sylvie Harvey is a 75 year old male who presents via phone visit today for the following health issues:    HPI  Discuss recent lab results and renew medications.    He has a history of coronary artery disease, hyperlipidemia, depression, hypertension, neuropathy.  He states he sees a neurologist about once a year for his neuropathy.  He states it does not seem to be doing much different well being on the gabapentin.  He denies any chest pain or shortness of breath or any headaches or dizziness.  He has no other concerns today.    Patient Active Problem List   Diagnosis     Advanced directives, counseling/discussion     Hypertension goal BP (blood pressure) < 140/90     Hyperlipidemia LDL goal <130     Pain in " shoulder     Plantar fasciitis     Medial meniscus tear     Coronary artery disease involving native heart with angina pectoris, unspecified vessel or lesion type (H)     Arthritis     Major depressive disorder, recurrent episode, mild (H)     Idiopathic peripheral neuropathy     CKD (chronic kidney disease) stage 3, GFR 30-59 ml/min (H)     SOB (shortness of breath)     Asbestos exposure     Snores     Obesity (BMI 35.0-39.9) with comorbidity (H)     KIMI (obstructive sleep apnea)     Tinnitus, bilateral     Sensorineural hearing loss (SNHL) of both ears     Past Surgical History:   Procedure Laterality Date     APPENDECTOMY       COLONOSCOPY WITH CO2 INSUFFLATION N/A 2018    Procedure: COLONOSCOPY WITH CO2 INSUFFLATION;  Surgeon: Duane, William Charles, MD;  Location: [facility] OR     ORTHOPEDIC SURGERY Right     Scar over dorsum aspect of right wrist.      ORTHOPEDIC SURGERY Right     Knee     ORTHOPEDIC SURGERY Bilateral     CTS release     SURGICAL HISTORY OF -       Umbilical Hernia     SURGICAL HISTORY OF -       Coronary Stent       Social History     Tobacco Use     Smoking status: Former Smoker     Last attempt to quit: 1983     Years since quittin.2     Smokeless tobacco: Never Used   Substance Use Topics     Alcohol use: No     Alcohol/week: 0.0 standard drinks     Comment: Quit 25 years ago ()     Family History   Problem Relation Age of Onset     Rheumatoid Arthritis Mother      LUNG DISEASE No family hx of          Current Outpatient Medications   Medication Sig Dispense Refill     aspirin 81 MG tablet Take 1 tablet by mouth daily.       atorvastatin (LIPITOR) 40 MG tablet TAKE 1 TABLET BY MOUTH  DAILY 90 tablet 0     buPROPion (WELLBUTRIN XL) 300 MG 24 hr tablet TAKE 1 TABLET BY MOUTH  EVERY MORNING 90 tablet 1     gabapentin (NEURONTIN) 400 MG capsule TAKE 1 CAPSULE BY MOUTH 3  TIMES DAILY 270 capsule 1     hydrochlorothiazide (HYDRODIURIL) 25 MG tablet TAKE 1 TABLET BY MOUTH  DAILY  90 tablet 0     losartan (COZAAR) 50 MG tablet TAKE 1 TABLET BY MOUTH  DAILY 90 tablet 1     metoprolol tartrate (LOPRESSOR) 25 MG tablet TAKE 1 TABLET BY MOUTH TWO  TIMES DAILY 180 tablet 1     ticagrelor (BRILINTA) 60 MG tablet Take 60 mg by mouth 2 times daily       nitroglycerin (NITROSTAT) 0.4 MG sublingual tablet Place 1 tablet (0.4 mg) under the tongue every 5 minutes as needed for chest pain (may repeat x2) (Patient not taking: Reported on 6/30/2020) 10 tablet 3     Allergies   Allergen Reactions     Sulfa Drugs      BP Readings from Last 3 Encounters:   04/13/20 (!) 146/82   01/16/20 131/79   12/17/19 124/62    Wt Readings from Last 3 Encounters:   04/23/20 115.2 kg (254 lb)   01/16/20 115 kg (253 lb 8 oz)   12/17/19 115.2 kg (254 lb)                    Reviewed and updated as needed this visit by Provider  Tobacco  Allergies  Meds  Problems  Med Hx  Surg Hx  Fam Hx         Review of Systems   Constitutional, HEENT, cardiovascular, pulmonary, gi and gu systems are negative, except as otherwise noted.       Objective   Reported vitals:  There were no vitals taken for this visit.   healthy, alert and no distress  PSYCH: Alert and oriented times 3; coherent speech, normal   rate and volume, able to articulate logical thoughts, able   to abstract reason, no tangential thoughts, no hallucinations   or delusions  His affect is normal  RESP: No cough, no audible wheezing, able to talk in full sentences  Remainder of exam unable to be completed due to telephone visits    Diagnostic Test Results:  Labs reviewed in Epic        Assessment/Plan:    ICD-10-CM    1. Coronary artery disease involving native heart with angina pectoris, unspecified vessel or lesion type (H)  I25.119    2. Hyperlipidemia LDL goal <130  E78.5    3. Major depressive disorder, recurrent episode, mild (H)  F33.0    4. Idiopathic peripheral neuropathy  G60.9    5. Hypertension goal BP (blood pressure) < 140/90  I10         I talked to  patient over the phone regarding his concerns.  Medical conditions are mostly stable.  Advised him to reach out to his neurologist for guidance on the peripheral neuropathy.  We did go over his lab results his triglycerides were elevated.  We talked about diet and exercise.  We talked about adding over-the-counter omega-3 fatty acids.  Also his creatinine is elevated.  We will have him push fluids.  We will recheck his blood pressure lab work in 4 weeks in the clinic.  This was discussed with Dr. Elliott.     Return in about 4 weeks (around 7/28/2020) for recheck.      Phone call duration:  5 minutes    Jesse Edgar PA-C

## 2020-07-01 NOTE — PROGRESS NOTES
Patient is scheduled to be seen in clinic for a Blood Pressure follow up on 08/04/2020 with TAMEKA Edgar.  FELICIA Dugan

## 2020-08-10 DIAGNOSIS — E78.5 HYPERLIPIDEMIA LDL GOAL <130: ICD-10-CM

## 2020-08-10 DIAGNOSIS — I10 HYPERTENSION GOAL BP (BLOOD PRESSURE) < 140/90: ICD-10-CM

## 2020-08-10 DIAGNOSIS — I25.119 CORONARY ARTERY DISEASE INVOLVING NATIVE HEART WITH ANGINA PECTORIS, UNSPECIFIED VESSEL OR LESION TYPE (H): ICD-10-CM

## 2020-08-11 RX ORDER — METOPROLOL TARTRATE 25 MG/1
TABLET, FILM COATED ORAL
Qty: 180 TABLET | Refills: 0 | Status: SHIPPED | OUTPATIENT
Start: 2020-08-11 | End: 2020-11-09

## 2020-08-11 RX ORDER — HYDROCHLOROTHIAZIDE 25 MG/1
TABLET ORAL
Qty: 90 TABLET | Refills: 0 | Status: SHIPPED | OUTPATIENT
Start: 2020-08-11 | End: 2020-11-09

## 2020-08-11 RX ORDER — LOSARTAN POTASSIUM 50 MG/1
TABLET ORAL
Qty: 90 TABLET | Refills: 0 | Status: SHIPPED | OUTPATIENT
Start: 2020-08-11 | End: 2020-11-09

## 2020-08-11 RX ORDER — ATORVASTATIN CALCIUM 40 MG/1
TABLET, FILM COATED ORAL
Qty: 90 TABLET | Refills: 3 | Status: SHIPPED | OUTPATIENT
Start: 2020-08-11 | End: 2021-08-04

## 2020-08-11 NOTE — TELEPHONE ENCOUNTER
Next 5 appointments (look out 90 days)    Aug 19, 2020  9:40 AM CDT  SHORT with Jesse Edgar PA-C  [facility] [address] [facility] [address] [phone]        Rx refilled per [facility] refill protocol.    Leila Cotton BSN, RN

## 2020-08-19 ENCOUNTER — OFFICE VISIT (OUTPATIENT)
Dept: FAMILY MEDICINE | Facility: CLINIC | Age: 75
End: 2020-08-19
Payer: COMMERCIAL

## 2020-08-19 VITALS
RESPIRATION RATE: 18 BRPM | WEIGHT: 247 LBS | HEART RATE: 72 BPM | HEIGHT: 70 IN | OXYGEN SATURATION: 96 % | TEMPERATURE: 98.2 F | DIASTOLIC BLOOD PRESSURE: 62 MMHG | SYSTOLIC BLOOD PRESSURE: 122 MMHG | BODY MASS INDEX: 35.36 KG/M2

## 2020-08-19 DIAGNOSIS — I25.119 CORONARY ARTERY DISEASE INVOLVING NATIVE HEART WITH ANGINA PECTORIS, UNSPECIFIED VESSEL OR LESION TYPE (H): ICD-10-CM

## 2020-08-19 DIAGNOSIS — E78.00 HIGH CHOLESTEROL: ICD-10-CM

## 2020-08-19 DIAGNOSIS — I10 HYPERTENSION GOAL BP (BLOOD PRESSURE) < 140/90: ICD-10-CM

## 2020-08-19 DIAGNOSIS — N18.30 CKD (CHRONIC KIDNEY DISEASE) STAGE 3, GFR 30-59 ML/MIN (H): Primary | ICD-10-CM

## 2020-08-19 DIAGNOSIS — G47.33 OSA (OBSTRUCTIVE SLEEP APNEA): ICD-10-CM

## 2020-08-19 LAB
ANION GAP SERPL CALCULATED.3IONS-SCNC: 7 MMOL/L (ref 3–14)
BUN SERPL-MCNC: 28 MG/DL (ref 7–30)
CALCIUM SERPL-MCNC: 9.2 MG/DL (ref 8.5–10.1)
CHLORIDE SERPL-SCNC: 106 MMOL/L (ref 94–109)
CO2 SERPL-SCNC: 25 MMOL/L (ref 20–32)
CREAT SERPL-MCNC: 1.43 MG/DL (ref 0.66–1.25)
GFR SERPL CREATININE-BSD FRML MDRD: 47 ML/MIN/{1.73_M2}
GLUCOSE SERPL-MCNC: 93 MG/DL (ref 70–99)
POTASSIUM SERPL-SCNC: 4.1 MMOL/L (ref 3.4–5.3)
SODIUM SERPL-SCNC: 139 MMOL/L (ref 133–144)

## 2020-08-19 PROCEDURE — 99214 OFFICE O/P EST MOD 30 MIN: CPT | Performed by: PHYSICIAN ASSISTANT

## 2020-08-19 PROCEDURE — 80048 BASIC METABOLIC PNL TOTAL CA: CPT | Performed by: PHYSICIAN ASSISTANT

## 2020-08-19 PROCEDURE — 36415 COLL VENOUS BLD VENIPUNCTURE: CPT | Performed by: PHYSICIAN ASSISTANT

## 2020-08-19 ASSESSMENT — MIFFLIN-ST. JEOR: SCORE: 1861.63

## 2020-08-19 ASSESSMENT — PATIENT HEALTH QUESTIONNAIRE - PHQ9: SUM OF ALL RESPONSES TO PHQ QUESTIONS 1-9: 0

## 2020-08-19 NOTE — RESULT ENCOUNTER NOTE
Mr. Harvey,    Your kidney tests are improved. Continue to work on good diet and water intake.   Lets recheck again in 6 months.     Please contact the clinic if you have additional questions.  Thank you.    Sincerely,    Jesse Edgar PA-C

## 2020-08-19 NOTE — PROGRESS NOTES
"Subjective     Sylvie Harvey is a 75 year old male who has a history of CKD stage III, hypertension, high cholesterol, sleep apnea, coronary artery disease presents to clinic today for the following health issues:    HPI       Hypertension Follow-up      Do you check your blood pressure regularly outside of the clinic? Yes     Are you following a low salt diet? No    Are your blood pressures ever more than 140 on the top number (systolic) OR more   than 90 on the bottom number (diastolic), for example 140/90? Yes      He is here for follow-up visit his last labs show elevation in his creatinine.  He states his been pushing fluids working on healthier diet.  He has no concerns of chest pain or headaches or dizziness.  He does continue to complain of shortness of breath worse when he is lays down.  He has seen a pulmonologist and was due for 6-month checkup.  He has seen his cardiologist and is due for recheck also.  The pulmonologist made comment that this possibly is more cardiac related.  He has a history of sleep apnea and at his last follow-up with his sleep specialist he was to do a 3-month follow-up which he is due for.    Review of Systems   Constitutional, HEENT, cardiovascular, pulmonary, gi and gu systems are negative, except as otherwise noted.      Objective    /62   Pulse 72   Temp 98.2  F (36.8  C) (Tympanic)   Resp 18   Ht 1.778 m (5' 10\")   Wt 112 kg (247 lb)   SpO2 96%   BMI 35.44 kg/m    Body mass index is 35.44 kg/m .  Physical Exam   GENERAL: healthy, alert and no distress  NECK: no adenopathy, no asymmetry, masses, or scars and thyroid normal to palpation  RESP: lungs clear to auscultation - no rales, rhonchi or wheezes  CV: regular rate and rhythm, normal S1 S2, no S3 or S4, no murmur, click or rub, no peripheral edema and peripheral pulses strong  MS: no gross musculoskeletal defects noted, no edema    No results found for this or any previous visit (from the past 24 " hour(s)).      Unresulted Labs Ordered in the Past 30 Days of this Admission     Date and Time Order Name Status Description    8/19/2020 1007 BASIC METABOLIC PANEL In process            Assessment & Plan       ICD-10-CM    1. CKD (chronic kidney disease) stage 3, GFR 30-59 ml/min (H)  N18.3 Basic metabolic panel   2. Hypertension goal BP (blood pressure) < 140/90  I10    3. High cholesterol  E78.00    4. KIMI (obstructive sleep apnea)  G47.33 SLEEP EVALUATION & MANAGEMENT REFERRAL - [facility] [place]  [phone] (Age 15 and up)   5. Coronary artery disease involving native heart with angina pectoris, unspecified vessel or lesion type (H)  I25.119    Talk to patient on his concerns.  Labs are pending.  Follow-up depend on lab results.  We talked with diet and exercise.  We talked about following up with the specialists including a pulmonologist cardiologist and sleep specialist.  Warning signs were discussed.    Return in about 6 months (around 2/19/2021) for recheck.    Jesse Edgar PA-C  [facility]

## 2020-11-07 DIAGNOSIS — I10 HYPERTENSION GOAL BP (BLOOD PRESSURE) < 140/90: ICD-10-CM

## 2020-11-07 DIAGNOSIS — G60.9 IDIOPATHIC PERIPHERAL NEUROPATHY: ICD-10-CM

## 2020-11-09 RX ORDER — LOSARTAN POTASSIUM 50 MG/1
TABLET ORAL
Qty: 90 TABLET | Refills: 3 | Status: SHIPPED | OUTPATIENT
Start: 2020-11-09 | End: 2021-08-04

## 2020-11-09 RX ORDER — GABAPENTIN 400 MG/1
CAPSULE ORAL
Qty: 270 CAPSULE | Refills: 3 | Status: SHIPPED | OUTPATIENT
Start: 2020-11-09 | End: 2021-08-04

## 2020-11-09 RX ORDER — METOPROLOL TARTRATE 25 MG/1
TABLET, FILM COATED ORAL
Qty: 180 TABLET | Refills: 2 | Status: SHIPPED | OUTPATIENT
Start: 2020-11-09 | End: 2021-08-04

## 2020-11-09 RX ORDER — HYDROCHLOROTHIAZIDE 25 MG/1
TABLET ORAL
Qty: 90 TABLET | Refills: 3 | Status: SHIPPED | OUTPATIENT
Start: 2020-11-09 | End: 2021-08-04

## 2020-11-09 NOTE — TELEPHONE ENCOUNTER
Routing refill request to provider for review/approval because:  Drug not on the [facility] refill protocol   Labs out of range:  Oscar FAJARDON, RN

## 2020-11-30 VITALS — BODY MASS INDEX: 32.93 KG/M2 | WEIGHT: 230 LBS | HEIGHT: 70 IN

## 2020-11-30 ASSESSMENT — MIFFLIN-ST. JEOR: SCORE: 1784.52

## 2020-11-30 NOTE — PROGRESS NOTES
"Sylvie Harvey is a 75 year old male who is being evaluated via a billable video visit.      The patient has been notified of following:     \"This video visit will be conducted via a call between you and your physician/provider. We have found that certain health care needs can be provided without the need for an in-person physical exam.  This service lets us provide the care you need with a video conversation.  If a prescription is necessary we can send it directly to your pharmacy.  If lab work is needed we can place an order for that and you can then stop by our lab to have the test done at a later time.    Video visits are billed at different rates depending on your insurance coverage.  Please reach out to your insurance provider with any questions.    If during the course of the call the physician/provider feels a video visit is not appropriate, you will not be charged for this service.\"    Patient has given verbal consent for Video visit? Yes  How would you like to obtain your AVS? MyChart  If you are dropped from the video visit, the video invite should be resent to: Send to e-mail at: [email or web address]  Will anyone else be joining your video visit? No      Video-Visit Details    Type of service:  Video Visit    Video Start Time: 2:34 PM  Video End Time: 2:59 PM    Originating Location (pt. Location): Home    Distant Location (provider location):  [facility]     Platform used for Video Visit: [facility]    Obstructive Sleep Apnea - PAP Follow-Up Visit:    Chief Complaint   Patient presents with     CPAP Follow Up       Sylvie Harvey comes in today for follow-up of their moderate sleep apnea, managed with CPAP.     9/17/2019 [facility] Sleep Apnea Testing - AHI 20.8/hr; Supine AHI 73.9/hr; SpO2 <= 88% for 19.7 minutes      His PAP interface is Nasal Mask.    Bedtime is typically 10 PM. Usually it takes about 5 minutes to fall asleep with the mask on. Wake time is " typically 3:30 AM.  Patient is using PAP therapy -- hours per night. The patient is usually getting 5-6 hours of sleep per night.    He does feel rested in the morning.    Total score - [place]: 4 (4/23/2020 10:00 AM)      ResMed   Auto-PAP 7.0 - 9.0 cmH2O 30 day usage data:    10% of days with > 4 hours of use. 18/30 days with no use.   Average use 53 minutes per day.   95%ile Leak 18.42 L/min.   CPAP 95% pressure 7.7 cm.   AHI 5.37 events per hour.     Current problems with PAP use include:  1. Difficulty breathing with CPAP, multiple pressure adjustments made.   2. He has frequent nasal congestion.  Has tired different  Medications.    Past medical/surgical history, family history, social history, medications and allergies were reviewed.      Problem List:  Patient Active Problem List    Diagnosis Date Noted     Tinnitus, bilateral 04/13/2020     Priority: Medium     Sensorineural hearing loss (SNHL) of both ears 04/13/2020     Priority: Medium     KIMI (obstructive sleep apnea) 09/17/2019     Priority: Medium     9/17/2019 [facility] Sleep Apnea Testing - AHI 20.8/hr; Supine AHI 73.9/hr; SpO2 <= 88% for 19.7 minutes.        Obesity (BMI 35.0-39.9) with comorbidity (H) 07/18/2019     Priority: Medium     SOB (shortness of breath) 06/26/2019     Priority: Medium     Asbestos exposure 06/26/2019     Priority: Medium     Snores 06/26/2019     Priority: Medium     Idiopathic peripheral neuropathy 10/23/2018     Priority: Medium     CKD (chronic kidney disease) stage 3, GFR 30-59 ml/min 10/23/2018     Priority: Medium     Advanced directives, counseling/discussion 04/24/2018     Priority: Medium     Advance Care Planning: Info given. TREVON Benjamin MA             Major depressive disorder, recurrent episode, mild (H) 10/18/2016     Priority: Medium     Coronary artery disease involving native heart with angina pectoris, unspecified vessel or lesion type (H) 04/01/2016     Priority: Medium     Arthritis 04/01/2016      "Priority: Medium     Medial meniscus tear 02/23/2015     Priority: Medium     Plantar fasciitis 06/25/2013     Priority: Medium     Pain in shoulder 05/18/2013     Priority: Medium     High cholesterol 04/26/2013     Priority: Medium     Hypertension goal BP (blood pressure) < 140/90 04/25/2013     Priority: Medium        Ht 1.778 m (5' 10\")   Wt 104.3 kg (230 lb)   BMI 33.00 kg/m      Impression/Plan:    Moderate Sleep apnea.  Not Tolerating PAP.   Daytime symptoms are improved.   We reviewed alternative treatment options.   Decided to continue with CPAP, but use full face mask.     Sylvie Harvey will follow up in about 4 month(s).     Gi Haywood PA-C        "

## 2020-12-01 ENCOUNTER — VIRTUAL VISIT (OUTPATIENT)
Dept: SLEEP MEDICINE | Facility: CLINIC | Age: 75
End: 2020-12-01
Payer: COMMERCIAL

## 2020-12-01 DIAGNOSIS — G47.33 OSA (OBSTRUCTIVE SLEEP APNEA): Primary | ICD-10-CM

## 2020-12-01 PROCEDURE — 99214 OFFICE O/P EST MOD 30 MIN: CPT | Mod: 95 | Performed by: PHYSICIAN ASSISTANT

## 2020-12-03 NOTE — PROGRESS NOTES
"Called x2. The patient did not answer. Left VM to reschedule.      Sylvie Harvey is a 75 year old male who is being evaluated via a billable telephone visit.      The patient has been notified of following:     \"This telephone visit will be conducted via a call between you and your physician/provider. We have found that certain health care needs can be provided without the need for a physical exam.  This service lets us provide the care you need with a short phone conversation.  If a prescription is necessary we can send it directly to your pharmacy.  If lab work is needed we can place an order for that and you can then stop by our lab to have the test done at a later time.    Telephone visits are billed at different rates depending on your insurance coverage. During this emergency period, for some insurers they may be billed the same as an in-person visit.  Please reach out to your insurance provider with any questions.    If during the course of the call the physician/provider feels a telephone visit is not appropriate, you will not be charged for this service.\"    Patient has given verbal consent for Telephone visit?  Yes    How would you like to obtain your AVS? Ally        Obstructive Sleep Apnea - PAP Follow-Up Visit:    ResMed   Auto-PAP 7.0 - 9.0 cmH2O 30 day usage data:    10% of days with > 4 hours of use. 14/30 days with no use.   Average use 89 minutes per day.   95%ile Leak 19.87 L/min.   CPAP 95% pressure 7.8 cm.   AHI 9.2 events per hour.     " Patient Name: Jak Pierre  : 1938    MRN: [id number]                              Today's Date: 12/3/2020       Admit Date: 2020    Visit Dx:     ICD-10-CM ICD-9-CM   1. Pneumonia due to COVID-19 virus  U07.1 480.8    J12.89    2. Hyperglycemia  R73.9 790.29     Patient Active Problem List   Diagnosis   • Tremors of nervous system   • Monoclonal gammopathy of unknown significance (MGUS)   • Acute respiratory failure due to COVID-19 (CMS/HCC)   • Acute infection without sepsis   • Acute respiratory failure with hypoxia (CMS/HCC)   • Cellulitis and abscess of left lower extremity   • Type 2 diabetes mellitus with nephropathy (CMS/HCC)   • Acute renal failure superimposed on stage 3 chronic kidney disease (CMS/HCC)   • Chronic kidney disease, stage III (moderate)   • Pneumonia due to COVID-19 virus   • COVID-19   • Thrombocytopenia (CMS/HCC)     Past Medical History:   Diagnosis Date   • Angina of effort (CMS/HCC)    • Aortic valve replaced    • Arthritis    • Cataract    • CHF (congestive heart failure) (CMS/HCC)    • Colitis    • Diabetes (CMS/HCC)    • Diverticulitis    • Gall stones    • Heart disease    • Hyperlipidemia    • Hypertension    • Hypertension    • Impaired functional mobility, balance, gait, and endurance    • Kidney stones    • Skin cancer     left shoulder   • Tremor      Past Surgical History:   Procedure Laterality Date   • AORTIC VALVE REPAIR/REPLACEMENT  2016   • COLONOSCOPY  2018   • HERNIA REPAIR  1963    x2   • SINUS SURGERY  1978     General Information     Row Name 20 1510          Physical Therapy Time and Intention    Document Type  therapy note (daily note)  -RM     Mode of Treatment  physical therapy  -RM     Row Name 20 1515          General Information    Patient Profile Reviewed  yes  -RM     Existing Precautions/Restrictions  fall;oxygen therapy device and L/min 3LPM  -RM     Row Name 20 1519          Cognition    Orientation Status (Cognition)   oriented to;person;time  -RM     Row Name 12/03/20 1510          Safety Issues, Functional Mobility    Safety Issues Affecting Function (Mobility)  insight into deficits/self-awareness;positioning of assistive device;safety precaution awareness;safety precautions follow-through/compliance;sequencing abilities  -RM     Impairments Affecting Function (Mobility)  balance;endurance/activity tolerance;cognition;strength  -RM     Cognitive Impairments, Mobility Safety/Performance  attention;impulsivity;insight into deficits/self-awareness;safety precaution awareness;safety precaution follow-through  -RM       User Key  (r) = Recorded By, (t) = Taken By, (c) = Cosigned By    Initials Name Provider Type    Uche Yates, ROGER Physical Therapy Assistant        Mobility     Row Name 12/03/20 1512          Bed Mobility    Rolling Left [place] (Bed Mobility)  contact guard;minimum assist (75% patient effort);verbal cues  -RM     Rolling Right [place] (Bed Mobility)  contact guard;minimum assist (75% patient effort);verbal cues  -RM     Assistive Device (Bed Mobility)  bed rails  -RM     Row Name 12/03/20 1512          Sit-Stand Transfer    Sit-Stand [place] (Transfers)  moderate assist (50% patient effort);minimum assist (75% patient effort);verbal cues  -RM     Assistive Device (Sit-Stand Transfers)  walker, front-wheeled  -RM     Row Name 12/03/20 1512          Gait/Stairs (Locomotion)    [place] Level (Gait)  minimum assist (75% patient effort);verbal cues  -RM     Assistive Device (Gait)  walker, front-wheeled  -RM     Distance in Feet (Gait)  3' side stepping to HOB.  -RM     Deviations/Abnormal Patterns (Gait)  weight shifting decreased;base of support, wide;gait speed decreased;festinating/shuffling  -RM       User Key  (r) = Recorded By, (t) = Taken By, (c) = Cosigned By    Initials Name Provider Type    Uche Yates, ROGER Physical Therapy Assistant        Obj/Interventions     Row Name  "12/03/20 1514          Motor Skills    Therapeutic Exercise  hip;knee;ankle  -RM     Row Name 12/03/20 1514          Hip (Therapeutic Exercise)    Hip (Therapeutic Exercise)  strengthening exercise  -RM     Hip Strengthening (Therapeutic Exercise)  heel slides;bilateral;supine  -RM     Row Name 12/03/20 1514          Knee (Therapeutic Exercise)    Knee (Therapeutic Exercise)  strengthening exercise  -RM     Knee Strengthening (Therapeutic Exercise)  SLR (straight leg raise);bilateral;10 repetitions  -RM     Row Name 12/03/20 1514          Ankle (Therapeutic Exercise)    Ankle (Therapeutic Exercise)  AAROM (active assistive range of motion)  -RM     Ankle AAROM (Therapeutic Exercise)  bilateral;dorsiflexion;plantarflexion;10 repetitions  -RM       User Key  (r) = Recorded By, (t) = Taken By, (c) = Cosigned By    Initials Name Provider Type    Uche Yates, PTA Physical Therapy Assistant        Goals/Plan    No documentation.       Clinical Impression     Row Name 12/03/20 1519          Pain    Additional Documentation  Pain Scale: Numbers Pre/Post-Treatment (Group)  -     Row Name 12/03/20 1519          Pain Scale: Numbers Pre/Post-Treatment    Pretreatment Pain Rating  0/10 - no pain  -RM     Posttreatment Pain Rating  0/10 - no pain  -RM     Pain Intervention(s)  Repositioned;Ambulation/increased activity  -     Row Name 12/03/20 1519          Plan of Care Review    Plan of Care Reviewed With  patient  -RM     Progress  improving  -     Outcome Summary  Pt much more alert this treatment. Pt presents with imporved ability to follow verbal directions. PT also was able to perform increased activity.  Pt performed supine TE as well as sit to stands x 3 with mod/min A. Pt also was able to side step to HOB with rw and min a . Pt also was able to perform two reps of static standing of 30\" with cga and rw. Pt was transferred back to bed and bed was placed in chair position. Restraints were reapplied L by PTA " and R by nurse.  Bed alram was activated.  See flowsheet for details  -RM     Row Name 12/03/20 1519          Vital Signs    Pre SpO2 (%)  94  -RM     O2 Delivery Pre Treatment  supplemental O2  -RM     Intra SpO2 (%)  92  -RM     O2 Delivery Intra Treatment  supplemental O2  -RM     Post SpO2 (%)  95  -RM     O2 Delivery Post Treatment  supplemental O2  -RM     Pre Patient Position  Supine  -RM     Intra Patient Position  Standing  -RM     Post Patient Position  Sitting  -RM     Row Name 12/03/20 1519          Positioning and Restraints    Pre-Treatment Position  in bed  -RM     Post Treatment Position  bed  -RM     In Bed  notified nsg;fowlers;call light within reach;encouraged to call for assist;exit alarm on  -RM     Restraints  reapplied:;notified nsg:;soft limb  -RM       User Key  (r) = Recorded By, (t) = Taken By, (c) = Cosigned By    Initials Name Provider Type    Uche Yates, ROGER Physical Therapy Assistant        Outcome Measures     Row Name 12/03/20 1524          How much help from another person do you currently need...    Turning from your back to your side while in flat bed without using bedrails?  3  -RM     Moving from lying on back to sitting on the side of a flat bed without bedrails?  3  -RM     Moving to and from a bed to a chair (including a wheelchair)?  2  -RM     Standing up from a chair using your arms (e.g., wheelchair, bedside chair)?  2  -RM     Climbing 3-5 steps with a railing?  1  -RM     To walk in hospital room?  2  -RM     AM-PAC 6 Clicks Score (PT)  13  -RM     Row Name 12/03/20 1524          Functional Assessment    Outcome Measure Options  AM-PAC 6 Clicks Basic Mobility (PT)  -RM       User Key  (r) = Recorded By, (t) = Taken By, (c) = Cosigned By    Initials Name Provider Type    Uche Yates, ROGER Physical Therapy Assistant        Physical Therapy Education                 Title: PT OT SLP Therapies (In Progress)     Topic: Physical Therapy (In Progress)      "Point: Mobility training (Done)     Learning Progress Summary           Patient Acceptance, E,TB,D, VU,NR by  at 12/3/2020 1525    Comment: Exercise techniques and safety with mobility    Acceptance, E,D, NR by  at 12/2/2020 1119    Comment: PT education for technique/safety with rolling and boosting in bed.                   Point: Home exercise program (Done)     Learning Progress Summary           Patient Acceptance, E,TB,D, VU,NR by  at 12/3/2020 1525    Comment: Exercise techniques and safety with mobility                   Point: Body mechanics (Not Started)     Learner Progress:  Not documented in this visit.                      User Key     Initials Effective Dates Name Provider Type Discipline     03/07/18 -  Uche Flores PTA Physical Therapy Assistant PT    TW 03/26/19 -  Fani Prince PT Physical Therapist PT              PT Recommendation and Plan     Plan of Care Reviewed With: patient  Progress: improving  Outcome Summary: Pt much more alert this treatment. Pt presents with imporved ability to follow verbal directions. PT also was able to perform increased activity.  Pt performed supine TE as well as sit to stands x 3 with mod/min A. Pt also was able to side step to HOB with rw and min a . Pt also was able to perform two reps of static standing of 30\" with cga and rw. Pt was transferred back to bed and bed was placed in chair position. Restraints were reapplied L by PTA and R by nurse.  Bed alram was activated.  See flowsheet for details     Time Calculation:   PT Charges     Row Name 12/03/20 1527             Time Calculation    Start Time  1342  -RM      Stop Time  1419  -RM      Time Calculation (min)  37 min  -RM      PT Received On  12/03/20  -RM      PT Goal Re-Cert Due Date  12/12/20  -RM         Time Calculation- PT    Total Timed Code Minutes- PT  37 minute(s)  -RM         Timed Charges    03006 - PT Therapeutic Exercise Minutes  15  -RM      76100 - PT Therapeutic Activity " Minutes  22  -RM        User Key  (r) = Recorded By, (t) = Taken By, (c) = Cosigned By    Initials Name Provider Type    Uche Yates, PTA Physical Therapy Assistant        Therapy Charges for Today     Code Description Service Date Service Provider Modifiers Qty    [id number]  PT THER PROC EA 15 MIN 12/3/2020 Uche Flores, PTA GP 1    [id number]  PT THERAPEUTIC ACT EA 15 MIN 12/3/2020 Uche Flores, PTA GP 1          PT G-Codes  Outcome Measure Options: AM-PAC 6 Clicks Basic Mobility (PT)  AM-PAC 6 Clicks Score (PT): 13    Uche Flores PTA  12/3/2020

## 2020-12-10 ENCOUNTER — VIRTUAL VISIT (OUTPATIENT)
Dept: PULMONOLOGY | Facility: CLINIC | Age: 75
End: 2020-12-10
Payer: COMMERCIAL

## 2020-12-10 DIAGNOSIS — J84.9 ILD (INTERSTITIAL LUNG DISEASE) (H): Primary | ICD-10-CM

## 2020-12-10 PROCEDURE — 99214 OFFICE O/P EST MOD 30 MIN: CPT | Mod: 95 | Performed by: INTERNAL MEDICINE

## 2020-12-10 NOTE — PROGRESS NOTES
"Sylvie Harvey is a 75 year old male who is being evaluated via a billable video visit.      The patient has been notified of following:     \"This video visit will be conducted via a call between you and your physician/provider. We have found that certain health care needs can be provided without the need for an in-person physical exam.  This service lets us provide the care you need with a video conversation.  If a prescription is necessary we can send it directly to your pharmacy.  If lab work is needed we can place an order for that and you can then stop by our lab to have the test done at a later time.    Video visits are billed at different rates depending on your insurance coverage.  Please reach out to your insurance provider with any questions.    If during the course of the call the physician/provider feels a video visit is not appropriate, you will not be charged for this service.\"    Patient has given verbal consent for Video visit? Yes  How would you like to obtain your AVS? Mail a copy  If you are dropped from the video visit, the video invite should be resent to: Send to e-mail at: [email or web address]  Will anyone else be joining your video visit? No      Reason for Visit  Sylvie Harvey is a 75 year old year old male who is being seen for RECHECK    ILD HPI    Sylvie Harvey is a 75-year-old male with mild interstitial lung abnormalities on CT scan without clear evidence of progressive fibrotic disease.  He also has a history of sleep apnea morbid obesity with a BMI of 38 and known cardiac issues as well.  He is evaluated today via virtual video visit.  He has had pretty significant dyspnea on exertion which I think is likely been multifactorial although his interstitial lung abnormalities are mild so there is not significant pulmonary explanation for his level of dyspnea.  He states overall he feels his dyspnea is worse than since last visit when I saw him in January of this year.  He " has a follow-up with his cardiologist in a couple of weeks as well.  He also continues to struggle with his CPAP he is trying to get a new mask however he has had difficulty tolerating the CPAP.  He denies any new symptoms of infection such as fevers chills night sweats or cough.  No other new complaints today.      Current Outpatient Medications   Medication     aspirin 81 MG tablet     atorvastatin (LIPITOR) 40 MG tablet     buPROPion (WELLBUTRIN XL) 300 MG 24 hr tablet     gabapentin (NEURONTIN) 400 MG capsule     hydrochlorothiazide (HYDRODIURIL) 25 MG tablet     losartan (COZAAR) 50 MG tablet     metoprolol tartrate (LOPRESSOR) 25 MG tablet     ticagrelor (BRILINTA) 60 MG tablet     nitroglycerin (NITROSTAT) 0.4 MG sublingual tablet     No current facility-administered medications for this visit.      Allergies   Allergen Reactions     Sulfa Drugs      Past Medical History:   Diagnosis Date     Alcohol abuse     Quit over 25 yrs ago     Arthritis      CKD (chronic kidney disease) stage 3, GFR 30-59 ml/min (H) 10/23/2018     Coronary artery disease involving native heart with angina pectoris, unspecified vessel or lesion type (H) 4/1/2016     Drug abuse (H)     Quit over 25 yrs ago     Headaches      Hyperlipidemia LDL goal <130 4/26/2013     Hypertension      Idiopathic peripheral neuropathy 10/23/2018     Major depressive disorder, recurrent episode, mild (H) 10/18/2016       Past Surgical History:   Procedure Laterality Date     APPENDECTOMY       COLONOSCOPY WITH CO2 INSUFFLATION N/A 12/14/2018    Procedure: COLONOSCOPY WITH CO2 INSUFFLATION;  Surgeon: Duane, William Charles, MD;  Location: [facility] OR     ORTHOPEDIC SURGERY Right     Scar over dorsum aspect of right wrist.      ORTHOPEDIC SURGERY Right     Knee     ORTHOPEDIC SURGERY Bilateral     CTS release     SURGICAL HISTORY OF -       Umbilical Hernia     SURGICAL HISTORY OF -   2013    Coronary Stent       Social History     Socioeconomic History      Marital status:      Spouse name: Jenna     Number of children: 4     Years of education: 12     Highest education level: Not on file   Occupational History     Occupation: [employer]     Employer: OTHER     Comment: Part-time [employer]   Social Needs     Financial resource strain: Not on file     Food insecurity     Worry: Not on file     Inability: Not on file     Transportation needs     Medical: Not on file     Non-medical: Not on file   Tobacco Use     Smoking status: Former Smoker     Quit date: 1983     Years since quittin.6     Smokeless tobacco: Never Used   Substance and Sexual Activity     Alcohol use: No     Alcohol/week: 0.0 standard drinks     Comment: Quit 25 years ago (2013)     Drug use: No     Sexual activity: Yes     Partners: Female   Lifestyle     Physical activity     Days per week: Not on file     Minutes per session: Not on file     Stress: Not on file   Relationships     Social connections     Talks on phone: Not on file     Gets together: Not on file     Attends Christian service: Not on file     Active member of club or organization: Not on file     Attends meetings of clubs or organizations: Not on file     Relationship status: Not on file     Intimate partner violence     Fear of current or ex partner: Not on file     Emotionally abused: Not on file     Physically abused: Not on file     Forced sexual activity: Not on file   Other Topics Concern     Parent/sibling w/ CABG, MI or angioplasty before 65F 55M? Not Asked   Social History Narrative     Not on file       Family History   Problem Relation Age of Onset     Rheumatoid Arthritis Mother      LUNG DISEASE No family hx of        ROS Pulmonary    A complete ROS was otherwise negative except as noted in the HPI.  Constitutional - looks well, in no apparent distress  Eyes - no redness or discharge  Respiratory -breathing appears comfortable.  No cough. Speaking in full sentences.  Skin - No appreciable discoloration or  lesions (very limited exam)  Neurological - No apparent tremors. Speech fluent and articlate  Psychiatric - no signs of delirium or anxiety     Exam limited to that easily identified on a virtual visit. The rest of a comprehensive physical examination is deferred due to [facility] (public health emergency) video visit restrictions.    Results: I have reviewed all imaging, PFTs and other relavent tests, please see below for details, PFT and imaging results were reviewed with the patient.    Assessment and plan:    75-year-old male with significant dyspnea on exertion that is multifactorial.  He did not get prior pulmonary function tests before this visit so we will order PFTs over the next several weeks.  If they are significantly decreased from previous then would need to consider high-resolution CT scan at that time.  Otherwise if they are stable I do not think his lungs are can attributing significantly to his dyspnea although certainly there is some contribution there.  I do not think this requires any further work-up or treatment as long as his pulmonary function test remained stable.  We will follow-up on the PFTs if they are otherwise stable I will see him back in 6 months with pulmonary function test.    CBC   Recent Labs   Lab Test 10/10/19  1519 04/25/13  1004   RBC 4.01* 4.85   HGB 12.3* 15.0   HCT 37.2* 44.9    301       Basic Metabolic Panel  Recent Labs   Lab Test 08/19/20  1010 06/18/20  0918    137   POTASSIUM 4.1 4.6   CHLORIDE 106 106   CO2 25 23   BUN 28 30   GLC 93 102*   GABE 9.2 9.3       INR  No results for input(s): INR in the last 88615 hours.    PFT  PFT Latest Ref Rng & Units 1/14/2020   FVC L 3.46   FEV1 L 2.80   FVC% % 87   FEV1% % 94           CC:        Video-Visit Details    Type of service:  Video Visit    Video Start Time: 1:45 PM  Video End Time: 1:55 PM    Originating Location (pt. Location): Home    Distant Location (provider location):  Home    Platform used for Video Visit:  [employer]Well David Perlman, M.D.    Pulmonary, Allergy and Critical Care Medicine  [facility]

## 2020-12-12 ENCOUNTER — HEALTH MAINTENANCE LETTER (OUTPATIENT)
Age: 75
End: 2020-12-12

## 2021-01-04 ENCOUNTER — OFFICE VISIT (OUTPATIENT)
Dept: NURSING | Facility: CLINIC | Age: 76
End: 2021-01-04
Payer: COMMERCIAL

## 2021-01-04 VITALS — BODY MASS INDEX: 36.47 KG/M2 | HEART RATE: 76 BPM | WEIGHT: 254.2 LBS | OXYGEN SATURATION: 97 %

## 2021-01-04 DIAGNOSIS — J84.9 ILD (INTERSTITIAL LUNG DISEASE) (H): ICD-10-CM

## 2021-01-04 PROCEDURE — 99207 PR DROP WITH A PROCEDURE: CPT | Performed by: INTERNAL MEDICINE

## 2021-01-04 PROCEDURE — 94729 DIFFUSING CAPACITY: CPT | Performed by: INTERNAL MEDICINE

## 2021-01-04 PROCEDURE — 94375 RESPIRATORY FLOW VOLUME LOOP: CPT | Performed by: INTERNAL MEDICINE

## 2021-01-05 LAB
DLCOUNC-%PRED-PRE: 70 %
DLCOUNC-PRE: 17.25 ML/MIN/MMHG
DLCOUNC-PRED: 24.37 ML/MIN/MMHG
ERV-%PRED-PRE: 45 %
ERV-PRE: 0.23 L
ERV-PRED: 0.5 L
EXPTIME-PRE: 6.78 SEC
FEF2575-%PRED-PRE: 134 %
FEF2575-PRE: 2.93 L/SEC
FEF2575-PRED: 2.17 L/SEC
FEFMAX-%PRED-PRE: 78 %
FEFMAX-PRE: 5.97 L/SEC
FEFMAX-PRED: 7.61 L/SEC
FEV1-%PRED-PRE: 95 %
FEV1-PRE: 2.81 L
FEV1FEV6-PRE: 81 %
FEV1FEV6-PRED: 77 %
FEV1FVC-PRE: 81 %
FEV1FVC-PRED: 75 %
FEV1SVC-PRE: 87 %
FEV1SVC-PRED: 66 %
FIFMAX-PRE: 5.89 L/SEC
FVC-%PRED-PRE: 88 %
FVC-PRE: 3.48 L
FVC-PRED: 3.93 L
IC-%PRED-PRE: 75 %
IC-PRE: 3.01 L
IC-PRED: 3.97 L
VA-%PRED-PRE: 84 %
VA-PRE: 5.19 L
VC-%PRED-PRE: 72 %
VC-PRE: 3.23 L
VC-PRED: 4.47 L

## 2021-01-13 ENCOUNTER — TELEPHONE (OUTPATIENT)
Dept: PULMONOLOGY | Facility: CLINIC | Age: 76
End: 2021-01-13

## 2021-01-13 NOTE — TELEPHONE ENCOUNTER
Contacted patient regarding PFT results from 01/04/2021. Per Dr. Perlman: PFTs are stable and a 6 month follow up is recommended. Informed patient of this information.    Patient states that he is dissatisfied with this follow up plan. Patient states that he is increasingly SOB at rest and feels as if the pulmonary team is not addressing his issues. Patient states that he has obtained a new CPAP mask, however, pt is extremely claustorphobic and has a hard time sleeping with it. Patient denies fever, chills, or cough. Patient does not currently use supplemental O2.    Writer offered patient a virtual visit with Dr. Perlman on Thursday 01/28/2021 to discuss these issues. Patient states that he is leaving for [place] this Sunday and will be out of state for 3 months.     Informed patient that Dr. Perlman may not be able to conduct virtual visit if patient is not in [place]. Informed patient that a message would be sent to Dr. Perlman for advisement.      Grace Bourne LPN  Pulmonary Division:  [facility]  Phone: [phone] Fax: [phone]

## 2021-01-15 NOTE — TELEPHONE ENCOUNTER
Patient returned call back to the pulmonary clinic. Informed patient of Dr. Perlman's recommendations. Patient declined virtual visit with Dr. Perlman and has been scheduled for echo 04/16/2021 per patient's request.      Grace Bourne LPN  Pulmonary Division:  [facility]  Phone: [phone] Fax: [phone]

## 2021-01-15 NOTE — TELEPHONE ENCOUNTER
Spoke with Dr. Perlman regarding patient's SOB. Per Dr. Perlman, patient's CT scan has only minimal changes and his PFTs are normal and stable to slightly improved from 1 year ago.     Dr. Perlman believes that the patient's SOB may be due to cardiac issues as well as KIMI. Per Dr. Perlman, pts KIMI is essentially untreated which can contribute to SOB.     Patient was supposed to have an echocaridogram after his January 2020 follow up, however, has not completed this. Dr. Perlman would be happy to see the patient virtually, however, Dr. Perlman strongly recommends patient inform his cardiologist of his SOB and follow through with completing an echo with bubble study.    VM left for patient requesting a call back to the clinic to discuss.      Grace Bourne LPN  Pulmonary Division:  [facility]  Phone: [phone] Fax: [phone]

## 2021-03-05 DIAGNOSIS — F33.0 MAJOR DEPRESSIVE DISORDER, RECURRENT EPISODE, MILD (H): ICD-10-CM

## 2021-03-05 RX ORDER — BUPROPION HYDROCHLORIDE 300 MG/1
TABLET ORAL
Qty: 90 TABLET | Refills: 0 | Status: SHIPPED | OUTPATIENT
Start: 2021-03-05 | End: 2021-05-25

## 2021-03-05 NOTE — LETTER
March 5, 2021    Sylvie Harvey  [address] [address] [facility] [address]    Dear Sylvie,       We recently received a refill request for buPROPion (WELLBUTRIN XL).  We have refilled this for a one time 90 day supply only because you are due for a:    Mood/Depression office visit in clinic for further refills.      Please call at your earliest convenience so that there will not be a delay with your future refills.          Thank you,   Your [facility] Team/[facility]  [phone]

## 2021-03-05 NOTE — TELEPHONE ENCOUNTER
Routing refill request to provider for review/approval because:  Patient needs to be seen because:  depression  PHQ 10/23/2018 6/26/2019 8/19/2020   PHQ-9 Total Score 0 0 0   Q9: Thoughts of better off dead/self-harm past 2 weeks Not at all Not at all Not at all     Health Maintenance Due   Topic Date Due     COVID-19 Vaccine (1 of 2) Never done     ZOSTER IMMUNIZATION (2 of 3) 12/14/2016     MEDICARE ANNUAL WELLNESS VISIT  10/18/2017     INFLUENZA VACCINE (1) 09/01/2020     BMP  02/19/2021     PHQ-9  02/19/2021       Gisselle Lubin RN

## 2021-04-05 ENCOUNTER — OFFICE VISIT (OUTPATIENT)
Dept: ORTHOPEDICS | Facility: CLINIC | Age: 76
End: 2021-04-05
Payer: COMMERCIAL

## 2021-04-05 VITALS
HEIGHT: 70 IN | BODY MASS INDEX: 34.36 KG/M2 | WEIGHT: 240 LBS | DIASTOLIC BLOOD PRESSURE: 64 MMHG | SYSTOLIC BLOOD PRESSURE: 132 MMHG

## 2021-04-05 DIAGNOSIS — M75.122 COMPLETE TEAR OF LEFT ROTATOR CUFF, UNSPECIFIED WHETHER TRAUMATIC: ICD-10-CM

## 2021-04-05 DIAGNOSIS — M25.511 CHRONIC PAIN OF BOTH SHOULDERS: Primary | ICD-10-CM

## 2021-04-05 DIAGNOSIS — M25.512 CHRONIC PAIN OF BOTH SHOULDERS: Primary | ICD-10-CM

## 2021-04-05 DIAGNOSIS — M75.111 INCOMPLETE TEAR OF RIGHT ROTATOR CUFF, UNSPECIFIED WHETHER TRAUMATIC: ICD-10-CM

## 2021-04-05 DIAGNOSIS — G89.29 CHRONIC PAIN OF BOTH SHOULDERS: Primary | ICD-10-CM

## 2021-04-05 PROCEDURE — 99213 OFFICE O/P EST LOW 20 MIN: CPT | Performed by: PEDIATRICS

## 2021-04-05 ASSESSMENT — MIFFLIN-ST. JEOR: SCORE: 1824.88

## 2021-04-05 NOTE — PATIENT INSTRUCTIONS
We reviewed the previous MRI scans, showing rotator cuff tendon tearing in both shoulders.  Options: 1) monitoring, stay active as able; 2) physical therapy; 3) steroid injection (done for pain); 4) referral to discuss further with orthopedic surgery.  Given overall comfort level and function, it is certainly fine to monitor for now. If desiring to change course in the future, contact the clinic. If interested in a repeat injection for the right shoulder, you can make an appointment to return and we can do the injection.  Otherwise, contact clinic if questions/concerns.    If you have any further questions for your physician or physician s care team you can call [phone] and use option 3 to leave a voice message. Calls received during business hours will be returned same day.

## 2021-04-05 NOTE — LETTER
4/5/2021         RE: Sylvie Harvey  [id number] [address] [facility] [id number]        Dear Colleague,    Thank you for referring your patient, Sylvie Harvey, to the [facility]. Please see a copy of my visit note below.    Sports Medicine Clinic Visit      Assessment:  1. Chronic pain of both shoulders    2. Complete tear of left rotator cuff, unspecified whether traumatic    3. Incomplete tear of right rotator cuff, unspecified whether traumatic        Plan:  Discussed the assessment with the patient.  Reviewed course and options.  Overall decent function on left with decent comfort level; on right has grossly full ROM.  See patient instructions. Prefers to monitor for now. Can return to clinic for injection if interested.  Follow up: will leave open ended.  Questions answered. Discussed signs and symptoms that may indicate more serious issues; the patient was instructed to seek appropriate care if noted. Sylvie indicates understanding of these issues and agrees with the plan.          See Patient Instructions  Patient Instructions   We reviewed the previous MRI scans, showing rotator cuff tendon tearing in both shoulders.  Options: 1) monitoring, stay active as able; 2) physical therapy; 3) steroid injection (done for pain); 4) referral to discuss further with orthopedic surgery.  Given overall comfort level and function, it is certainly fine to monitor for now. If desiring to change course in the future, contact the clinic. If interested in a repeat injection for the right shoulder, you can make an appointment to return and we can do the injection.  Otherwise, contact clinic if questions/concerns.    If you have any further questions for your physician or physician s care team you can call [phone] and use option 3 to leave a voice message. Calls received during business hours will be returned same day.          Jeevan Diego,   [facility]      ==========================================      PCP: Jesse Edgar BRII Harvey is a 76 year old male who is seen in f/u up for    Chronic pain of both shoulders  Complete tear of left rotator cuff, unspecified whether traumatic  Incomplete tear of right rotator cuff, unspecified whether traumatic. Since last visit on 12/17/2019 patient has begun to have pain again in his right shoulder.  He had an increase in pain a few months ago, but feels it has calmed down and is back to his baseline.   Does feel he had good relief from the right shoulder injection.  Unsure if he needs a repeat injection, would like to discuss today.   .     **  Primary concern is right shoulder today.  Couple months ago had more pain in right shoulder, now improving a bit again by history.  Notes with overhead motion at shoulder, then gets pain. However, still feels like has good motion in shoulder. Can reach overhead.        Review of Systems  All other systems reviewed and are negative unless noted above.    Past Medical History:   Diagnosis Date     Alcohol abuse     Quit over 25 yrs ago     Arthritis      CKD (chronic kidney disease) stage 3, GFR 30-59 ml/min 10/23/2018     Coronary artery disease involving native heart with angina pectoris, unspecified vessel or lesion type (H) 4/1/2016     Drug abuse (H)     Quit over 25 yrs ago     Headaches      Hyperlipidemia LDL goal <130 4/26/2013     Hypertension      Idiopathic peripheral neuropathy 10/23/2018     Major depressive disorder, recurrent episode, mild (H) 10/18/2016     Past Surgical History:   Procedure Laterality Date     APPENDECTOMY       COLONOSCOPY WITH CO2 INSUFFLATION N/A 12/14/2018    Procedure: COLONOSCOPY WITH CO2 INSUFFLATION;  Surgeon: Duane, William Charles, MD;  Location: [facility] OR     ORTHOPEDIC SURGERY Right     Scar over dorsum aspect of right wrist.      ORTHOPEDIC SURGERY Right     Knee     ORTHOPEDIC SURGERY  "Bilateral     CTS release     SURGICAL HISTORY OF -       Umbilical Hernia     SURGICAL HISTORY OF -   2013    Coronary Stent     Family History   Problem Relation Age of Onset     Rheumatoid Arthritis Mother      LUNG DISEASE No family hx of      Social History     Socioeconomic History     Marital status:      Spouse name: Jenna     Number of children: 4     Years of education: 12     Highest education level: Not on file   Occupational History     Occupation: [employer]     Employer: OTHER     Comment: Part-time francesco   Social Needs     Financial resource strain: Not on file     Food insecurity     Worry: Not on file     Inability: Not on file     Transportation needs     Medical: Not on file     Non-medical: Not on file   Tobacco Use     Smoking status: Former Smoker     Quit date: 1983     Years since quittin.9     Smokeless tobacco: Never Used   Substance and Sexual Activity     Alcohol use: No     Alcohol/week: 0.0 standard drinks     Comment: Quit 25 years ago ()     Drug use: No     Sexual activity: Yes     Partners: Female   Lifestyle     Physical activity     Days per week: Not on file     Minutes per session: Not on file     Stress: Not on file   Relationships     Social connections     Talks on phone: Not on file     Gets together: Not on file     Attends Hinduism service: Not on file     Active member of club or organization: Not on file     Attends meetings of clubs or organizations: Not on file     Relationship status: Not on file     Intimate partner violence     Fear of current or ex partner: Not on file     Emotionally abused: Not on file     Physically abused: Not on file     Forced sexual activity: Not on file   Other Topics Concern     Parent/sibling w/ CABG, MI or angioplasty before 65F 55M? Not Asked   Social History Narrative     Not on file         Objective  /64   Ht 1.778 m (5' 10\")   Wt 108.9 kg (240 lb)   BMI 34.44 kg/m      GENERAL APPEARANCE: healthy, " alert and no distress   GAIT: NORMAL  SKIN: no suspicious lesions or rashes  NEURO: Normal strength and tone, mentation intact and speech normal  PSYCH:  mentation appears normal and affect normal/bright  HEENT: no scleral icterus  CV: distal perfusion intact  RESP: nonlabored breathing      Exam  Bilateral Shoulder exam    ROM:      forward flexion right grossly full, left ~120-130        abduction to above shoulder level bilat       internal rotation able to do belly press position bilat       external rotation min limitation left compared to right          Radiology  Previous MRI scans visualized/reviewed again:    MR SHOULDER RIGHT WITHOUT CONTRAST  12/7/2019 9:28 AM     HISTORY: Shoulder pain, rotator cuff tear/impingement suspected;  evaluate rotator cuff; fall six months ago. Persistent weakness and  decreased range of motion.     COMPARISON: 11/19/2019 x-rays.     TECHNIQUE: Coronal oblique T1, T2, and fat suppressed T2, sagittal  oblique T2, and transverse proton density and T2 weighted images.     FINDINGS: Images mildly degraded by motion.     Osseous acromial outlet: There is a type II subacromial configuration.  Mild subacromial spur/enthesophyte. Moderate AC degenerative arthrosis  and marginal hypertrophic change. Findings would correlate with  subacromial impingement. Subcoracoid interval patent.     Rotator cuff: Moderate to severe supraspinatus tendinopathy with  partial-thickness articular surface tear central footprint measuring  12 mm AP width x 12 mm mediolateral length involving 50-75% tendon  thickness with intact bursal surface fibers. No evidence for  full-thickness extension or retraction.     Moderate infraspinatus and subscapularis tendinopathy without discrete  tear. Mild interstitial cystic delamination within the infraspinatus  myotendinous junction with small dissecting cysts.     Mild supraspinatus muscle atrophy. Musculature otherwise preserved.     Labral Structures: Degeneration  and tearing posterior labrum.     Biceps Tendon: Moderate to severe tendinopathy with partial-thickness  longitudinal split tearing. No subluxation.     Osseous structures: Mild resorptive cysts are noted along the  anatomical neck of the humerus beneath the infraspinatus tendon  attachment. Marrow signal about the shoulder is otherwise  unremarkable. No significant/visible chondromalacia allowing for  blurring from motion. No fracture or osseous lesion.     Joint space: Mild joint effusion and synovitis.     Additional findings: Mild fluid and synovitis subacromial/subdeltoid  bursa.                                                                       IMPRESSION:   1. Moderate/severe supraspinatus tendinopathy with partial-thickness  articular surface tear. Mild muscle atrophy.  2. Infraspinatus and subscapularis tendinopathy without discrete tear.  Mild interstitial cystic changes infraspinatus myotendinous junction.  Remainder of musculature preserved.  3. Narrowing supraspinatus outlet would correlate with subacromial  impingement.  4. Mild subacromial/subdeltoid bursitis.  5. Biceps tendinopathy and interstitial longitudinal split tearing  without subluxation.  6. Degeneration and tearing posterior labrum.     NASREEN CORTES MD  =========================    MR SHOULDER LEFT WITHOUT CONTRAST  12/7/2019 9:28 AM     HISTORY:  Shoulder pain, rotator cuff tear/impingement suspected.  Evaluate rotator cuff. Chronic pain of both shoulders. Fall about six  months ago. Weakness and decreased range of motion.     COMPARISON: 11/19/2019 x-rays.     TECHNIQUE: Coronal oblique T1, T2, and fat suppressed T2, sagittal  oblique T2, and transverse proton density and T2 weighted images.     FINDINGS:   Osseous acromial outlet: There is a acquired type III subacromial  configuration. Moderate anterior subacromial spur/enthesophyte.  Moderate AC joint arthrosis and inferior hypertrophic changes.  Superior migration humeral  head with severe narrowing of the  acromiohumeral space. Patent subcoracoid interval.     Rotator cuff: Complete full thickness full width supraspinatus and  infraspinatus tears with retraction tendon edge up to 5.5 cm to the  superior glenoid. Severe supraspinatus and moderate infraspinatus  muscle atrophy. Teres minor muscle and tendon remain intact.     Tendinopathy and ill-defined partial thickness tearing upper  subscapularis tendon. No full thickness extension or fiber retraction.  Subscapularis muscle preserved.     Labral Structures: Labrum blurred due to motion with evidence for  degeneration and degenerative tearing of the posterior and superior  labrum. No paralabral cyst.     Biceps Tendon: Moderate to severe tendinopathy, surface fraying, and  probable partial thickness longitudinal split tearing in the rotator  interval without subluxation.     Osseous structures: No bone contusion/bone marrow edema, fracture, or  osseous lesion. Mild chronic resorptive change greater tuberosity.  Localized area of severe chondromalacia superior humeral head near the  rotator cuff footprint and broad-based moderate chondromalacia in the  glenoid. Cartilage is blurred due to motion.     Joint space: Moderate joint effusion with tenosynovitis joint  recesses.     Additional findings: Moderate fluid and synovitis in the overlying  subacromial/subdeltoid and subscapularis bursae.                                                                       IMPRESSION:   1. Retracted complete full thickness full width supraspinatus and  infraspinatus tendon tears. Severe supraspinatus and moderate  infraspinatus muscle atrophy.  2. Tendinopathy and indistinct partial thickness tearing upper  subscapularis footprint.  3. Biceps tendinopathy, surface fraying, and probable longitudinal  split tearing of the rotator interval. No subluxation.  4. Severe narrowing coracoacromial arch would correlate with  subacromial impingement.  5. No  acute osseous pathology. Glenohumeral joint chondromalacia.  6. Moderate joint effusion and synovitis. Fluid and synovitis in the  overlying bursae.  7. Degeneration/tearing superior and posterior labrum.     NASREEN CORTES MD              This note consists of symbols derived from keyboarding, dictation and/or voice recognition software. As a result, there may be errors in the script that have gone undetected. Please consider this when interpreting information found in this chart.          Again, thank you for allowing me to participate in the care of your patient.        Sincerely,        Jeevan Diego, DO

## 2021-04-05 NOTE — PROGRESS NOTES
Sports Medicine Clinic Visit      Assessment:  1. Chronic pain of both shoulders    2. Complete tear of left rotator cuff, unspecified whether traumatic    3. Incomplete tear of right rotator cuff, unspecified whether traumatic        Plan:  Discussed the assessment with the patient.  Reviewed course and options.  Overall decent function on left with decent comfort level; on right has grossly full ROM.  See patient instructions. Prefers to monitor for now. Can return to clinic for injection if interested.  Follow up: will leave open ended.  Questions answered. Discussed signs and symptoms that may indicate more serious issues; the patient was instructed to seek appropriate care if noted. Sylvie indicates understanding of these issues and agrees with the plan.          See Patient Instructions  Patient Instructions   We reviewed the previous MRI scans, showing rotator cuff tendon tearing in both shoulders.  Options: 1) monitoring, stay active as able; 2) physical therapy; 3) steroid injection (done for pain); 4) referral to discuss further with orthopedic surgery.  Given overall comfort level and function, it is certainly fine to monitor for now. If desiring to change course in the future, contact the clinic. If interested in a repeat injection for the right shoulder, you can make an appointment to return and we can do the injection.  Otherwise, contact clinic if questions/concerns.    If you have any further questions for your physician or physician s care team you can call [phone] and use option 3 to leave a voice message. Calls received during business hours will be returned same day.          Jeevan Diego DO  [facility]      ==========================================      PCP: Jesse Edgar BRII Harvey is a 76 year old male who is seen in f/u up for    Chronic pain of both shoulders  Complete tear of left rotator cuff, unspecified whether  traumatic  Incomplete tear of right rotator cuff, unspecified whether traumatic. Since last visit on 12/17/2019 patient has begun to have pain again in his right shoulder.  He had an increase in pain a few months ago, but feels it has calmed down and is back to his baseline.   Does feel he had good relief from the right shoulder injection.  Unsure if he needs a repeat injection, would like to discuss today.   .     **  Primary concern is right shoulder today.  Couple months ago had more pain in right shoulder, now improving a bit again by history.  Notes with overhead motion at shoulder, then gets pain. However, still feels like has good motion in shoulder. Can reach overhead.        Review of Systems  All other systems reviewed and are negative unless noted above.    Past Medical History:   Diagnosis Date     Alcohol abuse     Quit over 25 yrs ago     Arthritis      CKD (chronic kidney disease) stage 3, GFR 30-59 ml/min 10/23/2018     Coronary artery disease involving native heart with angina pectoris, unspecified vessel or lesion type (H) 4/1/2016     Drug abuse (H)     Quit over 25 yrs ago     Headaches      Hyperlipidemia LDL goal <130 4/26/2013     Hypertension      Idiopathic peripheral neuropathy 10/23/2018     Major depressive disorder, recurrent episode, mild (H) 10/18/2016     Past Surgical History:   Procedure Laterality Date     APPENDECTOMY       COLONOSCOPY WITH CO2 INSUFFLATION N/A 12/14/2018    Procedure: COLONOSCOPY WITH CO2 INSUFFLATION;  Surgeon: Duane, William Charles, MD;  Location: [facility] OR     ORTHOPEDIC SURGERY Right     Scar over dorsum aspect of right wrist.      ORTHOPEDIC SURGERY Right     Knee     ORTHOPEDIC SURGERY Bilateral     CTS release     SURGICAL HISTORY OF -       Umbilical Hernia     SURGICAL HISTORY OF -   2013    Coronary Stent     Family History   Problem Relation Age of Onset     Rheumatoid Arthritis Mother      LUNG DISEASE No family hx of      Social History  "    Socioeconomic History     Marital status:      Spouse name: Jenna     Number of children: 4     Years of education: 12     Highest education level: Not on file   Occupational History     Occupation: [employer]     Employer: OTHER     Comment: Part-time [employer]   Social Needs     Financial resource strain: Not on file     Food insecurity     Worry: Not on file     Inability: Not on file     Transportation needs     Medical: Not on file     Non-medical: Not on file   Tobacco Use     Smoking status: Former Smoker     Quit date: 1983     Years since quittin.9     Smokeless tobacco: Never Used   Substance and Sexual Activity     Alcohol use: No     Alcohol/week: 0.0 standard drinks     Comment: Quit 25 years ago (2013)     Drug use: No     Sexual activity: Yes     Partners: Female   Lifestyle     Physical activity     Days per week: Not on file     Minutes per session: Not on file     Stress: Not on file   Relationships     Social connections     Talks on phone: Not on file     Gets together: Not on file     Attends [employer] service: Not on file     Active member of club or organization: Not on file     Attends meetings of clubs or organizations: Not on file     Relationship status: Not on file     Intimate partner violence     Fear of current or ex partner: Not on file     Emotionally abused: Not on file     Physically abused: Not on file     Forced sexual activity: Not on file   Other Topics Concern     Parent/sibling w/ CABG, MI or angioplasty before 65F 55M? Not Asked   Social History Narrative     Not on file         Objective  /64   Ht 1.778 m (5' 10\")   Wt 108.9 kg (240 lb)   BMI 34.44 kg/m      GENERAL APPEARANCE: healthy, alert and no distress   GAIT: NORMAL  SKIN: no suspicious lesions or rashes  NEURO: Normal strength and tone, mentation intact and speech normal  PSYCH:  mentation appears normal and affect normal/bright  HEENT: no scleral icterus  CV: distal perfusion intact  RESP: " nonlabored breathing      Exam  Bilateral Shoulder exam    ROM:      forward flexion right grossly full, left ~120-130        abduction to above shoulder level bilat       internal rotation able to do belly press position bilat       external rotation min limitation left compared to right          Radiology  Previous MRI scans visualized/reviewed again:    MR SHOULDER RIGHT WITHOUT CONTRAST  12/7/2019 9:28 AM     HISTORY: Shoulder pain, rotator cuff tear/impingement suspected;  evaluate rotator cuff; fall six months ago. Persistent weakness and  decreased range of motion.     COMPARISON: 11/19/2019 x-rays.     TECHNIQUE: Coronal oblique T1, T2, and fat suppressed T2, sagittal  oblique T2, and transverse proton density and T2 weighted images.     FINDINGS: Images mildly degraded by motion.     Osseous acromial outlet: There is a type II subacromial configuration.  Mild subacromial spur/enthesophyte. Moderate AC degenerative arthrosis  and marginal hypertrophic change. Findings would correlate with  subacromial impingement. Subcoracoid interval patent.     Rotator cuff: Moderate to severe supraspinatus tendinopathy with  partial-thickness articular surface tear central footprint measuring  12 mm AP width x 12 mm mediolateral length involving 50-75% tendon  thickness with intact bursal surface fibers. No evidence for  full-thickness extension or retraction.     Moderate infraspinatus and subscapularis tendinopathy without discrete  tear. Mild interstitial cystic delamination within the infraspinatus  myotendinous junction with small dissecting cysts.     Mild supraspinatus muscle atrophy. Musculature otherwise preserved.     Labral Structures: Degeneration and tearing posterior labrum.     Biceps Tendon: Moderate to severe tendinopathy with partial-thickness  longitudinal split tearing. No subluxation.     Osseous structures: Mild resorptive cysts are noted along the  anatomical neck of the humerus beneath the  infraspinatus tendon  attachment. Marrow signal about the shoulder is otherwise  unremarkable. No significant/visible chondromalacia allowing for  blurring from motion. No fracture or osseous lesion.     Joint space: Mild joint effusion and synovitis.     Additional findings: Mild fluid and synovitis subacromial/subdeltoid  bursa.                                                                       IMPRESSION:   1. Moderate/severe supraspinatus tendinopathy with partial-thickness  articular surface tear. Mild muscle atrophy.  2. Infraspinatus and subscapularis tendinopathy without discrete tear.  Mild interstitial cystic changes infraspinatus myotendinous junction.  Remainder of musculature preserved.  3. Narrowing supraspinatus outlet would correlate with subacromial  impingement.  4. Mild subacromial/subdeltoid bursitis.  5. Biceps tendinopathy and interstitial longitudinal split tearing  without subluxation.  6. Degeneration and tearing posterior labrum.     NASREEN CORTES MD  =========================    MR SHOULDER LEFT WITHOUT CONTRAST  12/7/2019 9:28 AM     HISTORY:  Shoulder pain, rotator cuff tear/impingement suspected.  Evaluate rotator cuff. Chronic pain of both shoulders. Fall about six  months ago. Weakness and decreased range of motion.     COMPARISON: 11/19/2019 x-rays.     TECHNIQUE: Coronal oblique T1, T2, and fat suppressed T2, sagittal  oblique T2, and transverse proton density and T2 weighted images.     FINDINGS:   Osseous acromial outlet: There is a acquired type III subacromial  configuration. Moderate anterior subacromial spur/enthesophyte.  Moderate AC joint arthrosis and inferior hypertrophic changes.  Superior migration humeral head with severe narrowing of the  acromiohumeral space. Patent subcoracoid interval.     Rotator cuff: Complete full thickness full width supraspinatus and  infraspinatus tears with retraction tendon edge up to 5.5 cm to the  superior glenoid. Severe  supraspinatus and moderate infraspinatus  muscle atrophy. Teres minor muscle and tendon remain intact.     Tendinopathy and ill-defined partial thickness tearing upper  subscapularis tendon. No full thickness extension or fiber retraction.  Subscapularis muscle preserved.     Labral Structures: Labrum blurred due to motion with evidence for  degeneration and degenerative tearing of the posterior and superior  labrum. No paralabral cyst.     Biceps Tendon: Moderate to severe tendinopathy, surface fraying, and  probable partial thickness longitudinal split tearing in the rotator  interval without subluxation.     Osseous structures: No bone contusion/bone marrow edema, fracture, or  osseous lesion. Mild chronic resorptive change greater tuberosity.  Localized area of severe chondromalacia superior humeral head near the  rotator cuff footprint and broad-based moderate chondromalacia in the  glenoid. Cartilage is blurred due to motion.     Joint space: Moderate joint effusion with tenosynovitis joint  recesses.     Additional findings: Moderate fluid and synovitis in the overlying  subacromial/subdeltoid and subscapularis bursae.                                                                       IMPRESSION:   1. Retracted complete full thickness full width supraspinatus and  infraspinatus tendon tears. Severe supraspinatus and moderate  infraspinatus muscle atrophy.  2. Tendinopathy and indistinct partial thickness tearing upper  subscapularis footprint.  3. Biceps tendinopathy, surface fraying, and probable longitudinal  split tearing of the rotator interval. No subluxation.  4. Severe narrowing coracoacromial arch would correlate with  subacromial impingement.  5. No acute osseous pathology. Glenohumeral joint chondromalacia.  6. Moderate joint effusion and synovitis. Fluid and synovitis in the  overlying bursae.  7. Degeneration/tearing superior and posterior labrum.     NASREEN CORTES MD              This note  consists of symbols derived from keyboarding, dictation and/or voice recognition software. As a result, there may be errors in the script that have gone undetected. Please consider this when interpreting information found in this chart.

## 2021-04-11 ENCOUNTER — HEALTH MAINTENANCE LETTER (OUTPATIENT)
Age: 76
End: 2021-04-11

## 2021-04-16 ENCOUNTER — ANCILLARY PROCEDURE (OUTPATIENT)
Dept: CARDIOLOGY | Facility: CLINIC | Age: 76
End: 2021-04-16
Attending: INTERNAL MEDICINE
Payer: COMMERCIAL

## 2021-04-16 DIAGNOSIS — J84.9 ILD (INTERSTITIAL LUNG DISEASE) (H): Primary | ICD-10-CM

## 2021-04-16 PROCEDURE — 93306 TTE W/DOPPLER COMPLETE: CPT | Performed by: INTERNAL MEDICINE

## 2021-04-16 RX ORDER — ACYCLOVIR 200 MG/1
16 CAPSULE ORAL ONCE
Status: ACTIVE | OUTPATIENT
Start: 2021-04-16

## 2021-05-24 DIAGNOSIS — F33.0 MAJOR DEPRESSIVE DISORDER, RECURRENT EPISODE, MILD (H): ICD-10-CM

## 2021-05-24 NOTE — TELEPHONE ENCOUNTER
"Requested Prescriptions   Pending Prescriptions Disp Refills    buPROPion (WELLBUTRIN XL) 300 MG 24 hr tablet [Pharmacy Med Name: BUPROPION  300MG  TAB  XL 24 HR] 90 tablet 3     Sig: TAKE 1 TABLET BY MOUTH IN  THE MORNING       SSRIs Protocol Failed - 5/24/2021  3:44 AM        Failed - PHQ-9 score less than 5 in past 6 months     Please review last PHQ-9 score.           Failed - Recent (6 mo) or future (30 days) visit within the authorizing provider's specialty     Patient had office visit in the last 6 months or has a visit in the next 30 days with authorizing provider or within the authorizing provider's specialty.  See \"Patient Info\" tab in inbasket, or \"Choose Columns\" in Meds & Orders section of the refill encounter.            Passed - Medication is Bupropion     If the medication is Bupropion (Wellbutrin), and the patient is taking for smoking cessation; OK to refill.          Passed - Medication is active on med list        Passed - Patient is age 18 or older             "

## 2021-05-25 RX ORDER — BUPROPION HYDROCHLORIDE 300 MG/1
TABLET ORAL
Qty: 90 TABLET | Refills: 0 | Status: SHIPPED | OUTPATIENT
Start: 2021-05-25 | End: 2021-08-04

## 2021-06-14 ENCOUNTER — OFFICE VISIT (OUTPATIENT)
Dept: NURSING | Facility: CLINIC | Age: 76
End: 2021-06-14
Payer: COMMERCIAL

## 2021-06-14 VITALS — OXYGEN SATURATION: 96 % | WEIGHT: 254.3 LBS | HEART RATE: 90 BPM | BODY MASS INDEX: 36.49 KG/M2

## 2021-06-14 DIAGNOSIS — J84.9 ILD (INTERSTITIAL LUNG DISEASE) (H): ICD-10-CM

## 2021-06-14 PROCEDURE — 94729 DIFFUSING CAPACITY: CPT | Performed by: INTERNAL MEDICINE

## 2021-06-14 PROCEDURE — 94375 RESPIRATORY FLOW VOLUME LOOP: CPT | Performed by: INTERNAL MEDICINE

## 2021-06-16 LAB
DLCOUNC-%PRED-PRE: 71 %
DLCOUNC-PRE: 17.23 ML/MIN/MMHG
DLCOUNC-PRED: 24.22 ML/MIN/MMHG
ERV-%PRED-PRE: 91 %
ERV-PRE: 0.37 L
ERV-PRED: 0.41 L
EXPTIME-PRE: 6.47 SEC
FEF2575-%PRED-PRE: 133 %
FEF2575-PRE: 2.86 L/SEC
FEF2575-PRED: 2.13 L/SEC
FEFMAX-%PRED-PRE: 80 %
FEFMAX-PRE: 6.05 L/SEC
FEFMAX-PRED: 7.49 L/SEC
FEV1-%PRED-PRE: 94 %
FEV1-PRE: 2.76 L
FEV1FEV6-PRE: 80 %
FEV1FEV6-PRED: 77 %
FEV1FVC-PRE: 80 %
FEV1FVC-PRED: 75 %
FEV1SVC-PRE: 89 %
FEV1SVC-PRED: 65 %
FIFMAX-PRE: 4.76 L/SEC
FVC-%PRED-PRE: 88 %
FVC-PRE: 3.44 L
FVC-PRED: 3.89 L
IC-%PRED-PRE: 67 %
IC-PRE: 2.75 L
IC-PRED: 4.04 L
VA-%PRED-PRE: 81 %
VA-PRE: 4.95 L
VC-%PRED-PRE: 70 %
VC-PRE: 3.12 L
VC-PRED: 4.45 L

## 2021-06-17 ENCOUNTER — VIRTUAL VISIT (OUTPATIENT)
Dept: PULMONOLOGY | Facility: CLINIC | Age: 76
End: 2021-06-17
Payer: COMMERCIAL

## 2021-06-17 ENCOUNTER — TELEPHONE (OUTPATIENT)
Dept: PULMONOLOGY | Facility: CLINIC | Age: 76
End: 2021-06-17

## 2021-06-17 DIAGNOSIS — R06.00 DYSPNEA, UNSPECIFIED TYPE: Primary | ICD-10-CM

## 2021-06-17 PROCEDURE — 99214 OFFICE O/P EST MOD 30 MIN: CPT | Mod: GT | Performed by: INTERNAL MEDICINE

## 2021-06-17 NOTE — TELEPHONE ENCOUNTER
Pulmonary rehab referral, pulmonary office visit notes, and PFT testing has been faxed to [facility] @ [phone].      Grace Bourne LPN  Pulmonary Medicine:  [facility]  Phone: [phone] Fax: [phone]

## 2021-06-17 NOTE — PROGRESS NOTES
"Sylvie is a 76 year old who is being evaluated via a billable video visit.      Video visit - please email invite to: [email or web address]    How would you like to obtain your AVS? Ally  If the video visit is dropped, the invitation should be resent by: Send to e-mail at: [email or web address]  Will anyone else be joining your video visit? No       Grace Bourne LPN  Pulmonary Medicine:  [facility]  Phone: [phone] Fax: [phone]          Video Start Time: 11:33 AM  Video-Visit Details    Type of service:  Video Visit    Video End Time:11:49 AM    Originating Location (pt. Location): Home    Distant Location (provider location):  [facility]     Platform used for Video Visit: [employer]     Reason for Visit  Sylvie Harvey is a 76 year old year old male who is being seen for RECHECK (6 month ILD follow up. PFTs 06/14)    ILD HPI    Sylvie Harvey is a 76-year-old male with a history of some very mild interstitial lung abnormalities and normal pulmonary function tests with dyspnea of unclear etiology.  He also has known coronary artery disease.  He is seen today via virtual video visit.  Overall he states he feels he is stable.  He is little bit of a vague historian in trying to understand his shortness of breath/dyspnea on exertion he states that actually his shortness of breath tends to come on mostly when he is \"trying to relax\".  He notes that if he will sit down for period of time he will sort of forget to breathe and then find himself gasping for air.  Is unclear if he is dozing off when this happens.  He does have known obstructive sleep apnea and has not been able to tolerate CPAP so he has given up on trying to use this.  It is not clear that he has real exertional dyspnea as he is able to do his activities he works at [employer] 4 hours a day and this is a fairly physical job.  He does note that it is more difficult for him when he wears a mask. "  Otherwise overall feels stable with no other new complaints today.      Current Outpatient Medications   Medication     aspirin 81 MG tablet     atorvastatin (LIPITOR) 40 MG tablet     buPROPion (WELLBUTRIN XL) 300 MG 24 hr tablet     gabapentin (NEURONTIN) 400 MG capsule     hydrochlorothiazide (HYDRODIURIL) 25 MG tablet     losartan (COZAAR) 50 MG tablet     metoprolol tartrate (LOPRESSOR) 25 MG tablet     ticagrelor (BRILINTA) 60 MG tablet     nitroglycerin (NITROSTAT) 0.4 MG sublingual tablet     No current facility-administered medications for this visit.      Facility-Administered Medications Ordered in Other Visits   Medication     sodium chloride (PF) 0.9% PF flush 10 mL     sodium chloride bacteriostatic 0.9 % flush 16 mL     Allergies   Allergen Reactions     Sulfa Drugs      Past Medical History:   Diagnosis Date     Alcohol abuse     Quit over 25 yrs ago     Arthritis      CKD (chronic kidney disease) stage 3, GFR 30-59 ml/min 10/23/2018     Coronary artery disease involving native heart with angina pectoris, unspecified vessel or lesion type (H) 4/1/2016     Drug abuse (H)     Quit over 25 yrs ago     Headaches      Hyperlipidemia LDL goal <130 4/26/2013     Hypertension      Idiopathic peripheral neuropathy 10/23/2018     Major depressive disorder, recurrent episode, mild (H) 10/18/2016       Past Surgical History:   Procedure Laterality Date     APPENDECTOMY       COLONOSCOPY WITH CO2 INSUFFLATION N/A 12/14/2018    Procedure: COLONOSCOPY WITH CO2 INSUFFLATION;  Surgeon: Duane, William Charles, MD;  Location: [facility] OR     ORTHOPEDIC SURGERY Right     Scar over dorsum aspect of right wrist.      ORTHOPEDIC SURGERY Right     Knee     ORTHOPEDIC SURGERY Bilateral     CTS release     SURGICAL HISTORY OF -       Umbilical Hernia     SURGICAL HISTORY OF -   2013    Coronary Stent       Social History     Socioeconomic History     Marital status:      Spouse name: Jenna     Number of children: 4      Years of education: 12     Highest education level: Not on file   Occupational History     Occupation: [employer]     Employer: OTHER     Comment: Part-time [employer]   Social Needs     Financial resource strain: Not on file     Food insecurity     Worry: Not on file     Inability: Not on file     Transportation needs     Medical: Not on file     Non-medical: Not on file   Tobacco Use     Smoking status: Former Smoker     Quit date: 1983     Years since quittin.1     Smokeless tobacco: Never Used   Substance and Sexual Activity     Alcohol use: No     Alcohol/week: 0.0 standard drinks     Comment: Quit 25 years ago (2013)     Drug use: No     Sexual activity: Yes     Partners: Female   Lifestyle     Physical activity     Days per week: Not on file     Minutes per session: Not on file     Stress: Not on file   Relationships     Social connections     Talks on phone: Not on file     Gets together: Not on file     Attends Jainism service: Not on file     Active member of club or organization: Not on file     Attends meetings of clubs or organizations: Not on file     Relationship status: Not on file     Intimate partner violence     Fear of current or ex partner: Not on file     Emotionally abused: Not on file     Physically abused: Not on file     Forced sexual activity: Not on file   Other Topics Concern     Parent/sibling w/ CABG, MI or angioplasty before 65F 55M? Not Asked   Social History Narrative     Not on file       Family History   Problem Relation Age of Onset     Rheumatoid Arthritis Mother      LUNG DISEASE No family hx of        ROS Pulmonary    A complete ROS was otherwise negative except as noted in the HPI.  Constitutional - looks well, in no apparent distress  Eyes - no redness or discharge  Respiratory -breathing appears comfortable.  No cough. Speaking in full sentences.  Skin - No appreciable discoloration or lesions (very limited exam)  Neurological - No apparent tremors. Speech fluent and  articlate  Psychiatric - no signs of delirium or anxiety     Exam limited to that easily identified on a virtual visit.   Results: I have reviewed all imaging, PFTs and other relavent tests, please see below for details, PFT and imaging results were reviewed with the patient.  PFTs: Normal spirometry and diffusing capacity.  Stable from previous.    Assessment and plan:    76-year-old male with very mild interstitial lung abnormalities that are stable with normal pulmonary function test.  As I discussed with the patient his symptoms are difficult to explain as he does not clearly have exertional dyspnea.  I do wonder if a lot of this is due to his untreated sleep apnea and may be he has dozing off and having apneic episodes when he does not realize it.  As it does seem when he is awake and exerting himself he does not have that significant limitation.  He also has morbid obesity with a BMI of 37 and we discussed how weight loss might improve his sleep apnea and also improve his symptoms.  He did recently have an echo showing a positive bubble study though I do not think this is likely significantly contributing the patient's shortness of breath.  He does have follow-up with his cardiologist in the fall and they can look at this.  Otherwise we did discuss pulmonary rehab as some breathing exercises might be helpful for the patient we will refer the patient for this.  Otherwise would not make any other changes given that he has had quite stable pulmonary function tests over several years I think I can follow him on an annual basis we will see him in 1 year with pulmonary function tests I will have him call sooner with any changes in his breathing.      CBC   Recent Labs   Lab Test 10/10/19  1519 04/25/13  1004   RBC 4.01* 4.85   HGB 12.3* 15.0   HCT 37.2* 44.9    301       Basic Metabolic Panel  Recent Labs   Lab Test 08/19/20  1010 06/18/20  0918    137   POTASSIUM 4.1 4.6   CHLORIDE 106 106   CO2 25 23    BUN 28 30   GLC 93 102*   GABE 9.2 9.3       INR  No results for input(s): INR in the last 94884 hours.    PFT  PFT Latest Ref Rng & Units 6/14/2021   FVC L 3.44   FEV1 L 2.76   FVC% % 88   FEV1% % 94           CC:

## 2021-07-14 ENCOUNTER — TELEPHONE (OUTPATIENT)
Dept: FAMILY MEDICINE | Facility: CLINIC | Age: 76
End: 2021-07-14

## 2021-07-14 DIAGNOSIS — E78.00 HIGH CHOLESTEROL: ICD-10-CM

## 2021-07-14 DIAGNOSIS — I10 HYPERTENSION GOAL BP (BLOOD PRESSURE) < 140/90: Primary | ICD-10-CM

## 2021-07-14 NOTE — TELEPHONE ENCOUNTER
The patient has an upcoming appointment with you on 8/4/21 to recheck depression.  He thinks he is also due for labs.  Can you add/sign lab orders if needed?  Do you want him to come in ahead of time for a lab appointment or do the labs at his appt with you?  The patient wants a call back.  Please advise and route back to .  Thank you.  Maryellen Ramos,  [facility]

## 2021-08-04 ENCOUNTER — OFFICE VISIT (OUTPATIENT)
Dept: FAMILY MEDICINE | Facility: CLINIC | Age: 76
End: 2021-08-04
Payer: COMMERCIAL

## 2021-08-04 VITALS
BODY MASS INDEX: 35.36 KG/M2 | TEMPERATURE: 98.2 F | HEIGHT: 70 IN | DIASTOLIC BLOOD PRESSURE: 78 MMHG | SYSTOLIC BLOOD PRESSURE: 129 MMHG | RESPIRATION RATE: 18 BRPM | WEIGHT: 247 LBS | OXYGEN SATURATION: 95 % | HEART RATE: 83 BPM

## 2021-08-04 DIAGNOSIS — I10 HYPERTENSION GOAL BP (BLOOD PRESSURE) < 140/90: ICD-10-CM

## 2021-08-04 DIAGNOSIS — E78.00 HIGH CHOLESTEROL: ICD-10-CM

## 2021-08-04 DIAGNOSIS — F33.0 MAJOR DEPRESSIVE DISORDER, RECURRENT EPISODE, MILD (H): ICD-10-CM

## 2021-08-04 DIAGNOSIS — N18.31 STAGE 3A CHRONIC KIDNEY DISEASE (H): ICD-10-CM

## 2021-08-04 DIAGNOSIS — J84.9 ILD (INTERSTITIAL LUNG DISEASE) (H): ICD-10-CM

## 2021-08-04 DIAGNOSIS — E66.01 MORBID OBESITY (H): ICD-10-CM

## 2021-08-04 DIAGNOSIS — G60.9 IDIOPATHIC PERIPHERAL NEUROPATHY: ICD-10-CM

## 2021-08-04 DIAGNOSIS — I25.119 CORONARY ARTERY DISEASE INVOLVING NATIVE HEART WITH ANGINA PECTORIS, UNSPECIFIED VESSEL OR LESION TYPE (H): ICD-10-CM

## 2021-08-04 LAB
ANION GAP SERPL CALCULATED.3IONS-SCNC: 7 MMOL/L (ref 3–14)
BUN SERPL-MCNC: 24 MG/DL (ref 7–30)
CALCIUM SERPL-MCNC: 9 MG/DL (ref 8.5–10.1)
CHLORIDE BLD-SCNC: 109 MMOL/L (ref 94–109)
CHOLEST SERPL-MCNC: 133 MG/DL
CO2 SERPL-SCNC: 23 MMOL/L (ref 20–32)
CREAT SERPL-MCNC: 1.52 MG/DL (ref 0.66–1.25)
FASTING STATUS PATIENT QL REPORTED: YES
GFR SERPL CREATININE-BSD FRML MDRD: 44 ML/MIN/1.73M2
GLUCOSE BLD-MCNC: 96 MG/DL (ref 70–99)
HDLC SERPL-MCNC: 33 MG/DL
LDLC SERPL CALC-MCNC: 52 MG/DL
NONHDLC SERPL-MCNC: 100 MG/DL
POTASSIUM BLD-SCNC: 4.2 MMOL/L (ref 3.4–5.3)
SODIUM SERPL-SCNC: 139 MMOL/L (ref 133–144)
TRIGL SERPL-MCNC: 241 MG/DL

## 2021-08-04 PROCEDURE — 80061 LIPID PANEL: CPT | Performed by: PHYSICIAN ASSISTANT

## 2021-08-04 PROCEDURE — 80048 BASIC METABOLIC PNL TOTAL CA: CPT | Performed by: PHYSICIAN ASSISTANT

## 2021-08-04 PROCEDURE — 36415 COLL VENOUS BLD VENIPUNCTURE: CPT | Performed by: PHYSICIAN ASSISTANT

## 2021-08-04 PROCEDURE — 99214 OFFICE O/P EST MOD 30 MIN: CPT | Performed by: PHYSICIAN ASSISTANT

## 2021-08-04 RX ORDER — HYDROCHLOROTHIAZIDE 25 MG/1
25 TABLET ORAL DAILY
Qty: 90 TABLET | Refills: 3 | Status: SHIPPED | OUTPATIENT
Start: 2021-08-04

## 2021-08-04 RX ORDER — METOPROLOL TARTRATE 25 MG/1
TABLET, FILM COATED ORAL
Qty: 180 TABLET | Refills: 1 | Status: SHIPPED | OUTPATIENT
Start: 2021-08-04 | End: 2022-04-05

## 2021-08-04 RX ORDER — ATORVASTATIN CALCIUM 40 MG/1
40 TABLET, FILM COATED ORAL DAILY
Qty: 90 TABLET | Refills: 1 | Status: SHIPPED | OUTPATIENT
Start: 2021-08-04 | End: 2022-04-05

## 2021-08-04 RX ORDER — LOSARTAN POTASSIUM 50 MG/1
50 TABLET ORAL DAILY
Qty: 90 TABLET | Refills: 1 | Status: SHIPPED | OUTPATIENT
Start: 2021-08-04 | End: 2022-04-06

## 2021-08-04 RX ORDER — GABAPENTIN 400 MG/1
CAPSULE ORAL
Qty: 270 CAPSULE | Refills: 1 | Status: SHIPPED | OUTPATIENT
Start: 2021-08-04

## 2021-08-04 RX ORDER — BUPROPION HYDROCHLORIDE 300 MG/1
TABLET ORAL
Qty: 90 TABLET | Refills: 1 | Status: SHIPPED | OUTPATIENT
Start: 2021-08-04 | End: 2022-04-06

## 2021-08-04 ASSESSMENT — PATIENT HEALTH QUESTIONNAIRE - PHQ9: SUM OF ALL RESPONSES TO PHQ QUESTIONS 1-9: 0

## 2021-08-04 ASSESSMENT — MIFFLIN-ST. JEOR: SCORE: 1856.63

## 2021-08-04 NOTE — PROGRESS NOTES
"    Assessment & Plan       ICD-10-CM    1. Coronary artery disease involving native heart with angina pectoris, unspecified vessel or lesion type (H)  I25.119 atorvastatin (LIPITOR) 40 MG tablet   2. Hypertension goal BP (blood pressure) < 140/90  I10 hydrochlorothiazide (HYDRODIURIL) 25 MG tablet     losartan (COZAAR) 50 MG tablet     metoprolol tartrate (LOPRESSOR) 25 MG tablet     Basic metabolic panel  (Ca, Cl, CO2, Creat, Gluc, K, Na, BUN)   3. High cholesterol  E78.00 atorvastatin (LIPITOR) 40 MG tablet     Lipid panel reflex to direct LDL Fasting   4. Major depressive disorder, recurrent episode, mild (H)  F33.0 buPROPion (WELLBUTRIN XL) 300 MG 24 hr tablet   5. Idiopathic peripheral neuropathy  G60.9 gabapentin (NEURONTIN) 400 MG capsule   6. ILD (interstitial lung disease) (H)  J84.9    7. Stage 3a chronic kidney disease  N18.31    8. Morbid obesity (H)  E66.01      medical conditions are stable. meds refilled.  con't care with his specialist in cardiology, pulmonology and neurology.   Recheck 6 months.       BMI:   Estimated body mass index is 35.44 kg/m  as calculated from the following:    Height as of this encounter: 1.778 m (5' 10\").    Weight as of this encounter: 112 kg (247 lb).   Weight management plan: Patient referred to endocrine and/or weight management specialty      Return in about 6 months (around 2/4/2022) for recheck.    AMIRAH Xiao [facility] is a 76 year old who presents for the following health issues     HPI     Depression Followup    How are you doing with your depression since your last visit? Improved with medication     Are you having other symptoms that might be associated with depression? No    Have you had a significant life event?  No     Are you feeling anxious or having panic attacks?   No    Do you have any concerns with your use of alcohol or other drugs? No    Social History     Tobacco Use     Smoking status: " "Former Smoker     Quit date: 1983     Years since quittin.3     Smokeless tobacco: Never Used   Vaping Use     Vaping Use: Never used   Substance Use Topics     Alcohol use: No     Alcohol/week: 0.0 standard drinks     Comment: Quit 25 years ago ()     Drug use: No     PHQ 10/23/2018 2019 2020   PHQ-9 Total Score 0 0 0   Q9: Thoughts of better off dead/self-harm past 2 weeks Not at all Not at all Not at all     ARY-7 SCORE 10/22/2015 10/23/2018 2019   Total Score - - -   Total Score 0 0 0         Suicide Assessment Five-step Evaluation and Treatment (SAFE-T)        Hyperlipidemia Follow-Up      Are you regularly taking any medication or supplement to lower your cholesterol?   Yes- Atorvastatin     Are you having muscle aches or other side effects that you think could be caused by your cholesterol lowering medication?  No    Hypertension Follow-up      Do you check your blood pressure regularly outside of the clinic? Yes     Are you following a low salt diet? Less salt    Are your blood pressures ever more than 140 on the top number (systolic) OR more   than 90 on the bottom number (diastolic), for example 140/90? No    History of CAD and see's cardiology yearly.    History of bilateral feet neuropathy and see's neurology.     History of interstitial lung dz. See's pulmonology.       Review of Systems   Constitutional, HEENT, cardiovascular, pulmonary, gi and gu systems are negative, except as otherwise noted.      Objective    /78   Pulse 83   Temp 98.2  F (36.8  C) (Tympanic)   Resp 18   Ht 1.778 m (5' 10\")   Wt 112 kg (247 lb)   SpO2 95%   BMI 35.44 kg/m    Body mass index is 35.44 kg/m .  Physical Exam   GENERAL: healthy, alert and no distress  EYES: Eyes grossly normal to inspection, PERRL and conjunctivae and sclerae normal  HENT: ear canals and TM's normal, nose and mouth without ulcers or lesions  NECK: no adenopathy, no asymmetry, masses, or scars and thyroid normal " to palpation  RESP: lungs clear to auscultation - no rales, rhonchi or wheezes  CV: regular rate and rhythm, normal S1 S2, no S3 or S4, no murmur, click or rub, no peripheral edema and peripheral pulses strong  ABDOMEN: soft, nontender, no hepatosplenomegaly, no masses and bowel sounds normal  MS: no gross musculoskeletal defects noted, no edema  PSYCH: mentation appears normal, affect normal/bright    Labs pending      Labs pending

## 2021-08-16 ENCOUNTER — CARE COORDINATION (OUTPATIENT)
Dept: SLEEP MEDICINE | Facility: CLINIC | Age: 76
End: 2021-08-16

## 2021-08-16 DIAGNOSIS — G47.33 OSA (OBSTRUCTIVE SLEEP APNEA): ICD-10-CM

## 2021-08-26 ENCOUNTER — TRANSFERRED RECORDS (OUTPATIENT)
Dept: HEALTH INFORMATION MANAGEMENT | Facility: CLINIC | Age: 76
End: 2021-08-26

## 2021-09-26 ENCOUNTER — HEALTH MAINTENANCE LETTER (OUTPATIENT)
Age: 76
End: 2021-09-26

## 2021-10-08 NOTE — TELEPHONE ENCOUNTER
Letter sent to patient to schedule.    Amanda Forrest,       
Medication is being filled for 1 time refill only due to:  Patient needs to be seen for fasting lab appointment and appointment with the provider for further refills.  Sandra Fernandez RN    
show

## 2021-10-19 ENCOUNTER — TRANSFERRED RECORDS (OUTPATIENT)
Dept: HEALTH INFORMATION MANAGEMENT | Facility: CLINIC | Age: 76
End: 2021-10-19

## 2022-01-14 ENCOUNTER — TRANSFERRED RECORDS (OUTPATIENT)
Dept: HEALTH INFORMATION MANAGEMENT | Facility: CLINIC | Age: 77
End: 2022-01-14
Payer: COMMERCIAL

## 2022-02-17 PROBLEM — Z71.89 ADVANCED DIRECTIVES, COUNSELING/DISCUSSION: Status: ACTIVE | Noted: 2018-04-24

## 2022-04-05 DIAGNOSIS — I25.119 CORONARY ARTERY DISEASE INVOLVING NATIVE HEART WITH ANGINA PECTORIS, UNSPECIFIED VESSEL OR LESION TYPE (H): ICD-10-CM

## 2022-04-05 DIAGNOSIS — I10 HYPERTENSION GOAL BP (BLOOD PRESSURE) < 140/90: ICD-10-CM

## 2022-04-05 DIAGNOSIS — E78.00 HIGH CHOLESTEROL: ICD-10-CM

## 2022-04-05 DIAGNOSIS — F33.0 MAJOR DEPRESSIVE DISORDER, RECURRENT EPISODE, MILD (H): ICD-10-CM

## 2022-04-05 RX ORDER — ATORVASTATIN CALCIUM 40 MG/1
TABLET, FILM COATED ORAL
Qty: 90 TABLET | Refills: 0 | Status: SHIPPED | OUTPATIENT
Start: 2022-04-05 | End: 2022-07-21

## 2022-04-05 RX ORDER — METOPROLOL TARTRATE 25 MG/1
TABLET, FILM COATED ORAL
Qty: 180 TABLET | Refills: 0 | Status: SHIPPED | OUTPATIENT
Start: 2022-04-05 | End: 2022-07-21

## 2022-04-05 NOTE — TELEPHONE ENCOUNTER
"Requested Prescriptions   Pending Prescriptions Disp Refills    buPROPion (WELLBUTRIN XL) 300 MG 24 hr tablet [Pharmacy Med Name: buPROPion HCl ER (XL) 300 MG Oral Tablet Extended Release 24 Hour] 90 tablet 3     Sig: TAKE 1 TABLET BY MOUTH IN  THE MORNING        SSRIs Protocol Failed - 4/5/2022  1:06 PM        Failed - PHQ-9 score less than 5 in past 6 months     Please review last PHQ-9 score.           Failed - Recent (6 mo) or future (30 days) visit within the authorizing provider's specialty     Patient had office visit in the last 6 months or has a visit in the next 30 days with authorizing provider or within the authorizing provider's specialty.  See \"Patient Info\" tab in inbasket, or \"Choose Columns\" in Meds & Orders section of the refill encounter.            Passed - Medication is Bupropion     If the medication is Bupropion (Wellbutrin), and the patient is taking for smoking cessation; OK to refill.            Passed - Medication is active on med list        Passed - Patient is age 18 or older           losartan (COZAAR) 50 MG tablet [Pharmacy Med Name: Losartan Potassium 50 MG Oral Tablet] 90 tablet 3     Sig: TAKE 1 TABLET BY MOUTH  DAILY        Angiotensin-II Receptors Failed - 4/5/2022  1:06 PM        Failed - Normal serum creatinine on file in past 12 months     Recent Labs   Lab Test 08/04/21  1047   CR 1.52*       Ok to refill medication if creatinine is low          Passed - Last blood pressure under 140/90 in past 12 months       BP Readings from Last 3 Encounters:   08/04/21 129/78   04/05/21 132/64   08/19/20 122/62                 Passed - Recent (12 mo) or future (30 days) visit within the authorizing provider's specialty     Patient has had an office visit with the authorizing provider or a provider within the authorizing providers department within the previous 12 mos or has a future within next 30 days. See \"Patient Info\" tab in inbasket, or \"Choose Columns\" in Meds & Orders section of the " "refill encounter.              Passed - Medication is active on med list        Passed - Patient is age 18 or older        Passed - Normal serum potassium on file in past 12 months       Recent Labs   Lab Test 08/04/21  1047   POTASSIUM 4.2                      Signed Prescriptions Disp Refills    atorvastatin (LIPITOR) 40 MG tablet 90 tablet 0     Sig: TAKE 1 TABLET BY MOUTH  DAILY        Statins Protocol Passed - 4/5/2022  3:44 AM        Passed - LDL on file in past 12 months     Recent Labs   Lab Test 08/04/21  1047   LDL 52               Passed - No abnormal creatine kinase in past 12 months     Recent Labs   Lab Test 09/26/19  1104   CKT 65                Passed - Recent (12 mo) or future (30 days) visit within the authorizing provider's specialty     Patient has had an office visit with the authorizing provider or a provider within the authorizing providers department within the previous 12 mos or has a future within next 30 days. See \"Patient Info\" tab in inbasket, or \"Choose Columns\" in Meds & Orders section of the refill encounter.              Passed - Medication is active on med list        Passed - Patient is age 18 or older           metoprolol tartrate (LOPRESSOR) 25 MG tablet 180 tablet 0     Sig: TAKE 1 TABLET BY MOUTH  TWICE DAILY        Beta-Blockers Protocol Passed - 4/5/2022  3:44 AM        Passed - Blood pressure under 140/90 in past 12 months       BP Readings from Last 3 Encounters:   08/04/21 129/78   04/05/21 132/64   08/19/20 122/62                 Passed - Patient is age 6 or older        Passed - Recent (12 mo) or future (30 days) visit within the authorizing provider's specialty     Patient has had an office visit with the authorizing provider or a provider within the authorizing providers department within the previous 12 mos or has a future within next 30 days. See \"Patient Info\" tab in inbasket, or \"Choose Columns\" in Meds & Orders section of the refill encounter.              Passed " - Medication is active on med list

## 2022-04-06 RX ORDER — LOSARTAN POTASSIUM 50 MG/1
TABLET ORAL
Qty: 90 TABLET | Refills: 1 | Status: SHIPPED | OUTPATIENT
Start: 2022-04-06

## 2022-04-06 RX ORDER — BUPROPION HYDROCHLORIDE 300 MG/1
TABLET ORAL
Qty: 90 TABLET | Refills: 1 | Status: SHIPPED | OUTPATIENT
Start: 2022-04-06

## 2022-05-08 ENCOUNTER — HEALTH MAINTENANCE LETTER (OUTPATIENT)
Age: 77
End: 2022-05-08

## 2022-07-19 DIAGNOSIS — I10 HYPERTENSION GOAL BP (BLOOD PRESSURE) < 140/90: ICD-10-CM

## 2022-07-19 DIAGNOSIS — I25.119 CORONARY ARTERY DISEASE INVOLVING NATIVE HEART WITH ANGINA PECTORIS, UNSPECIFIED VESSEL OR LESION TYPE (H): ICD-10-CM

## 2022-07-19 DIAGNOSIS — E78.00 HIGH CHOLESTEROL: ICD-10-CM

## 2022-07-21 RX ORDER — ATORVASTATIN CALCIUM 40 MG/1
TABLET, FILM COATED ORAL
Qty: 90 TABLET | Refills: 0 | Status: SHIPPED | OUTPATIENT
Start: 2022-07-21

## 2022-07-21 RX ORDER — METOPROLOL TARTRATE 25 MG/1
TABLET, FILM COATED ORAL
Qty: 180 TABLET | Refills: 0 | Status: SHIPPED | OUTPATIENT
Start: 2022-07-21

## 2022-10-18 DIAGNOSIS — I10 HYPERTENSION GOAL BP (BLOOD PRESSURE) < 140/90: ICD-10-CM

## 2022-10-18 DIAGNOSIS — E78.00 HIGH CHOLESTEROL: ICD-10-CM

## 2022-10-18 DIAGNOSIS — I25.119 CORONARY ARTERY DISEASE INVOLVING NATIVE HEART WITH ANGINA PECTORIS, UNSPECIFIED VESSEL OR LESION TYPE (H): ICD-10-CM

## 2022-10-18 DIAGNOSIS — F33.0 MAJOR DEPRESSIVE DISORDER, RECURRENT EPISODE, MILD (H): ICD-10-CM

## 2022-10-19 RX ORDER — BUPROPION HYDROCHLORIDE 300 MG/1
TABLET ORAL
Qty: 90 TABLET | Refills: 3 | OUTPATIENT
Start: 2022-10-19

## 2022-10-19 RX ORDER — LOSARTAN POTASSIUM 50 MG/1
TABLET ORAL
Qty: 90 TABLET | Refills: 3 | OUTPATIENT
Start: 2022-10-19

## 2022-10-19 RX ORDER — ATORVASTATIN CALCIUM 40 MG/1
TABLET, FILM COATED ORAL
Qty: 90 TABLET | Refills: 3 | OUTPATIENT
Start: 2022-10-19

## 2022-10-19 RX ORDER — METOPROLOL TARTRATE 25 MG/1
TABLET, FILM COATED ORAL
Qty: 180 TABLET | Refills: 3 | OUTPATIENT
Start: 2022-10-19

## 2022-11-02 DIAGNOSIS — I25.119 CORONARY ARTERY DISEASE INVOLVING NATIVE HEART WITH ANGINA PECTORIS, UNSPECIFIED VESSEL OR LESION TYPE (H): ICD-10-CM

## 2022-11-02 DIAGNOSIS — F33.0 MAJOR DEPRESSIVE DISORDER, RECURRENT EPISODE, MILD (H): ICD-10-CM

## 2022-11-02 DIAGNOSIS — I10 HYPERTENSION GOAL BP (BLOOD PRESSURE) < 140/90: ICD-10-CM

## 2022-11-02 DIAGNOSIS — E78.00 HIGH CHOLESTEROL: ICD-10-CM

## 2022-11-03 RX ORDER — LOSARTAN POTASSIUM 50 MG/1
TABLET ORAL
Qty: 90 TABLET | Refills: 3 | OUTPATIENT
Start: 2022-11-03

## 2022-11-03 RX ORDER — BUPROPION HYDROCHLORIDE 300 MG/1
TABLET ORAL
Qty: 90 TABLET | Refills: 3 | OUTPATIENT
Start: 2022-11-03

## 2022-11-03 RX ORDER — METOPROLOL TARTRATE 25 MG/1
TABLET, FILM COATED ORAL
Qty: 180 TABLET | Refills: 3 | OUTPATIENT
Start: 2022-11-03

## 2022-11-03 RX ORDER — ATORVASTATIN CALCIUM 40 MG/1
TABLET, FILM COATED ORAL
Qty: 90 TABLET | Refills: 3 | OUTPATIENT
Start: 2022-11-03

## 2023-01-08 ENCOUNTER — HEALTH MAINTENANCE LETTER (OUTPATIENT)
Age: 78
End: 2023-01-08

## 2023-06-02 ENCOUNTER — HEALTH MAINTENANCE LETTER (OUTPATIENT)
Age: 78
End: 2023-06-02

## 2024-06-17 PROBLEM — Z71.89 ADVANCED DIRECTIVES, COUNSELING/DISCUSSION: Status: RESOLVED | Noted: 2018-04-24 | Resolved: 2024-06-17

## 2024-06-29 ENCOUNTER — HEALTH MAINTENANCE LETTER (OUTPATIENT)
Age: 79
End: 2024-06-29

## (undated) DEVICE — SOL WATER IRRIG 1000ML BOTTLE 07139-09

## (undated) RX ORDER — SIMETHICONE 40MG/0.6ML
SUSPENSION, DROPS(FINAL DOSAGE FORM)(ML) ORAL
Status: DISPENSED
Start: 2018-12-14

## (undated) RX ORDER — FENTANYL CITRATE 50 UG/ML
INJECTION, SOLUTION INTRAMUSCULAR; INTRAVENOUS
Status: DISPENSED
Start: 2018-12-14